# Patient Record
Sex: MALE | Race: WHITE | Employment: UNEMPLOYED | URBAN - NONMETROPOLITAN AREA
[De-identification: names, ages, dates, MRNs, and addresses within clinical notes are randomized per-mention and may not be internally consistent; named-entity substitution may affect disease eponyms.]

---

## 2019-03-05 ENCOUNTER — HOSPITAL ENCOUNTER (INPATIENT)
Age: 44
LOS: 5 days | Discharge: HOME | End: 2019-03-10
Attending: HOSPITALIST | Admitting: HOSPITALIST
Payer: MEDICARE

## 2019-03-05 DIAGNOSIS — F32.89 OTHER DEPRESSION: Primary | ICD-10-CM

## 2019-03-05 DIAGNOSIS — F10.930 ALCOHOL WITHDRAWAL SYNDROME WITHOUT COMPLICATION (HCC): ICD-10-CM

## 2019-03-05 LAB
ACETAMINOPHEN LEVEL: < 5 UG/ML (ref 0–20)
ALBUMIN SERPL-MCNC: 3.9 G/DL (ref 3.5–5.1)
ALP BLD-CCNC: 209 U/L (ref 38–126)
ALT SERPL-CCNC: 74 U/L (ref 11–66)
AMPHETAMINE+METHAMPHETAMINE URINE SCREEN: NEGATIVE
ANION GAP SERPL CALCULATED.3IONS-SCNC: 19 MEQ/L (ref 8–16)
AST SERPL-CCNC: 175 U/L (ref 5–40)
BACTERIA: ABNORMAL /HPF
BARBITURATE QUANTITATIVE URINE: NEGATIVE
BASOPHILS # BLD: 0.6 %
BASOPHILS ABSOLUTE: 0 THOU/MM3 (ref 0–0.1)
BENZODIAZEPINE QUANTITATIVE URINE: NEGATIVE
BILIRUB SERPL-MCNC: 1.3 MG/DL (ref 0.3–1.2)
BILIRUBIN DIRECT: 0.7 MG/DL (ref 0–0.3)
BILIRUBIN URINE: ABNORMAL
BLOOD, URINE: NEGATIVE
BUN BLDV-MCNC: 5 MG/DL (ref 7–22)
CALCIUM SERPL-MCNC: 9.3 MG/DL (ref 8.5–10.5)
CANNABINOID QUANTITATIVE URINE: NEGATIVE
CASTS 2: ABNORMAL /LPF
CASTS UA: ABNORMAL /LPF
CHARACTER, URINE: CLEAR
CHLORIDE BLD-SCNC: 89 MEQ/L (ref 98–111)
CO2: 25 MEQ/L (ref 23–33)
COCAINE METABOLITE QUANTITATIVE URINE: NEGATIVE
COLOR: ABNORMAL
CREAT SERPL-MCNC: 1 MG/DL (ref 0.4–1.2)
CRYSTALS, UA: ABNORMAL
EOSINOPHIL # BLD: 1 %
EOSINOPHILS ABSOLUTE: 0.1 THOU/MM3 (ref 0–0.4)
EPITHELIAL CELLS, UA: ABNORMAL /HPF
ERYTHROCYTE [DISTWIDTH] IN BLOOD BY AUTOMATED COUNT: 14.4 % (ref 11.5–14.5)
ERYTHROCYTE [DISTWIDTH] IN BLOOD BY AUTOMATED COUNT: 49.9 FL (ref 35–45)
ETHYL ALCOHOL, SERUM: < 0.01 %
GFR SERPL CREATININE-BSD FRML MDRD: 81 ML/MIN/1.73M2
GLUCOSE BLD-MCNC: 140 MG/DL (ref 70–108)
GLUCOSE URINE: NEGATIVE MG/DL
HCT VFR BLD CALC: 42.8 % (ref 42–52)
HEMOGLOBIN: 14.9 GM/DL (ref 14–18)
ICTOTEST: NEGATIVE
IMMATURE GRANS (ABS): 0.05 THOU/MM3 (ref 0–0.07)
IMMATURE GRANULOCYTES: 0.7 %
KETONES, URINE: 15
LEUKOCYTE ESTERASE, URINE: NEGATIVE
LYMPHOCYTES # BLD: 26.1 %
LYMPHOCYTES ABSOLUTE: 1.7 THOU/MM3 (ref 1–4.8)
MCH RBC QN AUTO: 33.6 PG (ref 26–33)
MCHC RBC AUTO-ENTMCNC: 34.8 GM/DL (ref 32.2–35.5)
MCV RBC AUTO: 96.4 FL (ref 80–94)
MISCELLANEOUS 2: ABNORMAL
MONOCYTES # BLD: 7.6 %
MONOCYTES ABSOLUTE: 0.5 THOU/MM3 (ref 0.4–1.3)
NITRITE, URINE: NEGATIVE
NUCLEATED RED BLOOD CELLS: 0 /100 WBC
OPIATES, URINE: NEGATIVE
OSMOLALITY CALCULATION: 265.9 MOSMOL/KG (ref 275–300)
OXYCODONE: NEGATIVE
PH UA: 6 (ref 5–9)
PHENCYCLIDINE QUANTITATIVE URINE: NEGATIVE
PLATELET # BLD: 232 THOU/MM3 (ref 130–400)
PMV BLD AUTO: 10.6 FL (ref 9.4–12.4)
POTASSIUM SERPL-SCNC: 3.8 MEQ/L (ref 3.5–5.2)
PROTEIN UA: NEGATIVE
RBC # BLD: 4.44 MILL/MM3 (ref 4.7–6.1)
RBC URINE: ABNORMAL /HPF
REASON FOR REJECTION: NORMAL
REJECTED TEST: NORMAL
RENAL EPITHELIAL, UA: ABNORMAL
SALICYLATE, SERUM: < 0.3 MG/DL (ref 2–10)
SEG NEUTROPHILS: 64 %
SEGMENTED NEUTROPHILS ABSOLUTE COUNT: 4.3 THOU/MM3 (ref 1.8–7.7)
SODIUM BLD-SCNC: 133 MEQ/L (ref 135–145)
SPECIFIC GRAVITY, URINE: 1.01 (ref 1–1.03)
TOTAL PROTEIN: 7 G/DL (ref 6.1–8)
TSH SERPL DL<=0.05 MIU/L-ACNC: 1.86 UIU/ML (ref 0.4–4.2)
UROBILINOGEN, URINE: 4 EU/DL (ref 0–1)
WBC # BLD: 6.7 THOU/MM3 (ref 4.8–10.8)
WBC UA: ABNORMAL /HPF
YEAST: ABNORMAL

## 2019-03-05 PROCEDURE — 99222 1ST HOSP IP/OBS MODERATE 55: CPT | Performed by: HOSPITALIST

## 2019-03-05 PROCEDURE — 80307 DRUG TEST PRSMV CHEM ANLYZR: CPT

## 2019-03-05 PROCEDURE — 82248 BILIRUBIN DIRECT: CPT

## 2019-03-05 PROCEDURE — 36415 COLL VENOUS BLD VENIPUNCTURE: CPT

## 2019-03-05 PROCEDURE — 1200000000 HC SEMI PRIVATE

## 2019-03-05 PROCEDURE — 80053 COMPREHEN METABOLIC PANEL: CPT

## 2019-03-05 PROCEDURE — G0480 DRUG TEST DEF 1-7 CLASSES: HCPCS

## 2019-03-05 PROCEDURE — 81001 URINALYSIS AUTO W/SCOPE: CPT

## 2019-03-05 PROCEDURE — 84443 ASSAY THYROID STIM HORMONE: CPT

## 2019-03-05 PROCEDURE — 85025 COMPLETE CBC W/AUTO DIFF WBC: CPT

## 2019-03-05 PROCEDURE — 99285 EMERGENCY DEPT VISIT HI MDM: CPT

## 2019-03-05 RX ORDER — IBUPROFEN 200 MG
CAPSULE ORAL 2 TIMES DAILY
Status: DISCONTINUED | OUTPATIENT
Start: 2019-03-05 | End: 2019-03-10 | Stop reason: HOSPADM

## 2019-03-05 RX ORDER — TRAZODONE HYDROCHLORIDE 100 MG/1
100 TABLET ORAL NIGHTLY
Status: DISCONTINUED | OUTPATIENT
Start: 2019-03-05 | End: 2019-03-10 | Stop reason: HOSPADM

## 2019-03-05 RX ORDER — LORAZEPAM 2 MG/ML
1 INJECTION INTRAMUSCULAR
Status: DISCONTINUED | OUTPATIENT
Start: 2019-03-05 | End: 2019-03-10 | Stop reason: HOSPADM

## 2019-03-05 RX ORDER — THIAMINE HYDROCHLORIDE 100 MG/ML
100 INJECTION, SOLUTION INTRAMUSCULAR; INTRAVENOUS DAILY
Status: DISCONTINUED | OUTPATIENT
Start: 2019-03-06 | End: 2019-03-10 | Stop reason: HOSPADM

## 2019-03-05 RX ORDER — LORAZEPAM 2 MG/ML
2 INJECTION INTRAMUSCULAR
Status: DISCONTINUED | OUTPATIENT
Start: 2019-03-05 | End: 2019-03-10 | Stop reason: HOSPADM

## 2019-03-05 RX ORDER — LORAZEPAM 1 MG/1
1 TABLET ORAL
Status: DISCONTINUED | OUTPATIENT
Start: 2019-03-05 | End: 2019-03-10 | Stop reason: HOSPADM

## 2019-03-05 RX ORDER — LORAZEPAM 2 MG/1
4 TABLET ORAL
Status: DISCONTINUED | OUTPATIENT
Start: 2019-03-05 | End: 2019-03-10 | Stop reason: HOSPADM

## 2019-03-05 RX ORDER — LORAZEPAM 2 MG/ML
4 INJECTION INTRAMUSCULAR
Status: DISCONTINUED | OUTPATIENT
Start: 2019-03-05 | End: 2019-03-10 | Stop reason: HOSPADM

## 2019-03-05 RX ORDER — SODIUM CHLORIDE 9 MG/ML
INJECTION, SOLUTION INTRAVENOUS CONTINUOUS
Status: DISCONTINUED | OUTPATIENT
Start: 2019-03-05 | End: 2019-03-10 | Stop reason: HOSPADM

## 2019-03-05 RX ORDER — SODIUM CHLORIDE 0.9 % (FLUSH) 0.9 %
10 SYRINGE (ML) INJECTION EVERY 12 HOURS SCHEDULED
Status: DISCONTINUED | OUTPATIENT
Start: 2019-03-05 | End: 2019-03-10 | Stop reason: HOSPADM

## 2019-03-05 RX ORDER — FOLIC ACID 1 MG/1
1 TABLET ORAL DAILY
Status: DISCONTINUED | OUTPATIENT
Start: 2019-03-06 | End: 2019-03-10 | Stop reason: HOSPADM

## 2019-03-05 RX ORDER — ESCITALOPRAM OXALATE 20 MG/1
20 TABLET ORAL DAILY
Status: DISCONTINUED | OUTPATIENT
Start: 2019-03-06 | End: 2019-03-10 | Stop reason: HOSPADM

## 2019-03-05 RX ORDER — MULTIVITAMIN WITH FOLIC ACID 400 MCG
1 TABLET ORAL DAILY
Status: DISCONTINUED | OUTPATIENT
Start: 2019-03-06 | End: 2019-03-10 | Stop reason: HOSPADM

## 2019-03-05 RX ORDER — LORAZEPAM 2 MG/1
2 TABLET ORAL
Status: DISCONTINUED | OUTPATIENT
Start: 2019-03-05 | End: 2019-03-10 | Stop reason: HOSPADM

## 2019-03-05 RX ORDER — ESCITALOPRAM OXALATE 20 MG/1
30 TABLET ORAL DAILY
Status: ON HOLD | COMMUNITY
Start: 2020-10-01 | End: 2020-12-29 | Stop reason: SDUPTHER

## 2019-03-05 RX ORDER — ATORVASTATIN CALCIUM 10 MG/1
10 TABLET, FILM COATED ORAL NIGHTLY
Status: DISCONTINUED | OUTPATIENT
Start: 2019-03-05 | End: 2019-03-10 | Stop reason: HOSPADM

## 2019-03-05 RX ORDER — CITALOPRAM 20 MG/1
20 TABLET ORAL DAILY
Status: DISCONTINUED | OUTPATIENT
Start: 2019-03-06 | End: 2019-03-05 | Stop reason: ALTCHOICE

## 2019-03-05 RX ORDER — ONDANSETRON 2 MG/ML
4 INJECTION INTRAMUSCULAR; INTRAVENOUS EVERY 6 HOURS PRN
Status: DISCONTINUED | OUTPATIENT
Start: 2019-03-05 | End: 2019-03-10 | Stop reason: HOSPADM

## 2019-03-05 RX ORDER — LORAZEPAM 1 MG/1
1 TABLET ORAL DAILY
Status: ON HOLD | COMMUNITY
End: 2019-03-10 | Stop reason: HOSPADM

## 2019-03-05 RX ORDER — LORAZEPAM 2 MG/ML
3 INJECTION INTRAMUSCULAR
Status: DISCONTINUED | OUTPATIENT
Start: 2019-03-05 | End: 2019-03-10 | Stop reason: HOSPADM

## 2019-03-05 RX ORDER — PANTOPRAZOLE SODIUM 40 MG/1
40 TABLET, DELAYED RELEASE ORAL
Status: DISCONTINUED | OUTPATIENT
Start: 2019-03-06 | End: 2019-03-10 | Stop reason: HOSPADM

## 2019-03-05 RX ORDER — LORAZEPAM 1 MG/1
1 TABLET ORAL ONCE
Status: DISCONTINUED | OUTPATIENT
Start: 2019-03-05 | End: 2019-03-05 | Stop reason: HOSPADM

## 2019-03-05 RX ORDER — SODIUM CHLORIDE 0.9 % (FLUSH) 0.9 %
10 SYRINGE (ML) INJECTION PRN
Status: DISCONTINUED | OUTPATIENT
Start: 2019-03-05 | End: 2019-03-10 | Stop reason: HOSPADM

## 2019-03-05 ASSESSMENT — SLEEP AND FATIGUE QUESTIONNAIRES
DIFFICULTY ARISING: NO
DIFFICULTY FALLING ASLEEP: YES
SLEEP PATTERN: DIFFICULTY FALLING ASLEEP;NIGHTMARES/TERRORS;DISTURBED/INTERRUPTED SLEEP;RESTLESSNESS
DIFFICULTY STAYING ASLEEP: YES
DO YOU HAVE DIFFICULTY SLEEPING: YES
RESTFUL SLEEP: NO
AVERAGE NUMBER OF SLEEP HOURS: 6
DO YOU USE A SLEEP AID: NO

## 2019-03-05 ASSESSMENT — PATIENT HEALTH QUESTIONNAIRE - PHQ9: SUM OF ALL RESPONSES TO PHQ QUESTIONS 1-9: 23

## 2019-03-05 ASSESSMENT — PAIN SCALES - GENERAL: PAINLEVEL_OUTOF10: 0

## 2019-03-05 ASSESSMENT — LIFESTYLE VARIABLES: HISTORY_ALCOHOL_USE: YES

## 2019-03-06 ENCOUNTER — APPOINTMENT (OUTPATIENT)
Dept: GENERAL RADIOLOGY | Age: 44
End: 2019-03-06
Payer: MEDICARE

## 2019-03-06 LAB
ALBUMIN SERPL-MCNC: 3.6 G/DL (ref 3.5–5.1)
ALBUMIN SERPL-MCNC: 3.6 G/DL (ref 3.5–5.1)
ALP BLD-CCNC: 184 U/L (ref 38–126)
ALP BLD-CCNC: 192 U/L (ref 38–126)
ALT SERPL-CCNC: 66 U/L (ref 11–66)
ALT SERPL-CCNC: 67 U/L (ref 11–66)
ANION GAP SERPL CALCULATED.3IONS-SCNC: 16 MEQ/L (ref 8–16)
AST SERPL-CCNC: 133 U/L (ref 5–40)
AST SERPL-CCNC: 134 U/L (ref 5–40)
BASOPHILS # BLD: 0.4 %
BASOPHILS ABSOLUTE: 0 THOU/MM3 (ref 0–0.1)
BILIRUB SERPL-MCNC: 1.1 MG/DL (ref 0.3–1.2)
BILIRUB SERPL-MCNC: 1.1 MG/DL (ref 0.3–1.2)
BILIRUBIN DIRECT: 0.5 MG/DL (ref 0–0.3)
BUN BLDV-MCNC: 5 MG/DL (ref 7–22)
CALCIUM SERPL-MCNC: 9 MG/DL (ref 8.5–10.5)
CHLORIDE BLD-SCNC: 94 MEQ/L (ref 98–111)
CO2: 25 MEQ/L (ref 23–33)
CREAT SERPL-MCNC: 0.8 MG/DL (ref 0.4–1.2)
EKG ATRIAL RATE: 78 BPM
EKG P AXIS: 31 DEGREES
EKG P-R INTERVAL: 140 MS
EKG Q-T INTERVAL: 384 MS
EKG QRS DURATION: 96 MS
EKG QTC CALCULATION (BAZETT): 437 MS
EKG R AXIS: 63 DEGREES
EKG T AXIS: 22 DEGREES
EKG VENTRICULAR RATE: 78 BPM
EOSINOPHIL # BLD: 2.5 %
EOSINOPHILS ABSOLUTE: 0.1 THOU/MM3 (ref 0–0.4)
ERYTHROCYTE [DISTWIDTH] IN BLOOD BY AUTOMATED COUNT: 14.4 % (ref 11.5–14.5)
ERYTHROCYTE [DISTWIDTH] IN BLOOD BY AUTOMATED COUNT: 50.1 FL (ref 35–45)
GFR SERPL CREATININE-BSD FRML MDRD: > 90 ML/MIN/1.73M2
GLUCOSE BLD-MCNC: 121 MG/DL (ref 70–108)
HCT VFR BLD CALC: 36.4 % (ref 42–52)
HEMOGLOBIN: 12.6 GM/DL (ref 14–18)
IMMATURE GRANS (ABS): 0.03 THOU/MM3 (ref 0–0.07)
IMMATURE GRANULOCYTES: 0.6 %
LYMPHOCYTES # BLD: 35.9 %
LYMPHOCYTES ABSOLUTE: 1.7 THOU/MM3 (ref 1–4.8)
MCH RBC QN AUTO: 33.6 PG (ref 26–33)
MCHC RBC AUTO-ENTMCNC: 34.6 GM/DL (ref 32.2–35.5)
MCV RBC AUTO: 97.1 FL (ref 80–94)
MONOCYTES # BLD: 7 %
MONOCYTES ABSOLUTE: 0.3 THOU/MM3 (ref 0.4–1.3)
NUCLEATED RED BLOOD CELLS: 0 /100 WBC
PLATELET # BLD: 169 THOU/MM3 (ref 130–400)
PMV BLD AUTO: 10.7 FL (ref 9.4–12.4)
POTASSIUM SERPL-SCNC: 3.8 MEQ/L (ref 3.5–5.2)
RBC # BLD: 3.75 MILL/MM3 (ref 4.7–6.1)
REASON FOR REJECTION: NORMAL
REJECTED TEST: NORMAL
SEG NEUTROPHILS: 53.6 %
SEGMENTED NEUTROPHILS ABSOLUTE COUNT: 2.5 THOU/MM3 (ref 1.8–7.7)
SODIUM BLD-SCNC: 135 MEQ/L (ref 135–145)
TOTAL PROTEIN: 6.3 G/DL (ref 6.1–8)
TOTAL PROTEIN: 6.6 G/DL (ref 6.1–8)
WBC # BLD: 4.7 THOU/MM3 (ref 4.8–10.8)

## 2019-03-06 PROCEDURE — 2709999900 HC NON-CHARGEABLE SUPPLY

## 2019-03-06 PROCEDURE — 71046 X-RAY EXAM CHEST 2 VIEWS: CPT

## 2019-03-06 PROCEDURE — 80076 HEPATIC FUNCTION PANEL: CPT

## 2019-03-06 PROCEDURE — 80053 COMPREHEN METABOLIC PANEL: CPT

## 2019-03-06 PROCEDURE — 99232 SBSQ HOSP IP/OBS MODERATE 35: CPT | Performed by: FAMILY MEDICINE

## 2019-03-06 PROCEDURE — 85025 COMPLETE CBC W/AUTO DIFF WBC: CPT

## 2019-03-06 PROCEDURE — 6360000002 HC RX W HCPCS: Performed by: HOSPITALIST

## 2019-03-06 PROCEDURE — 1200000000 HC SEMI PRIVATE

## 2019-03-06 PROCEDURE — 2580000003 HC RX 258: Performed by: HOSPITALIST

## 2019-03-06 PROCEDURE — 6370000000 HC RX 637 (ALT 250 FOR IP): Performed by: HOSPITALIST

## 2019-03-06 PROCEDURE — 36415 COLL VENOUS BLD VENIPUNCTURE: CPT

## 2019-03-06 PROCEDURE — 93005 ELECTROCARDIOGRAM TRACING: CPT | Performed by: HOSPITALIST

## 2019-03-06 PROCEDURE — 93010 ELECTROCARDIOGRAM REPORT: CPT | Performed by: INTERNAL MEDICINE

## 2019-03-06 PROCEDURE — 90792 PSYCH DIAG EVAL W/MED SRVCS: CPT | Performed by: PHYSICIAN ASSISTANT

## 2019-03-06 RX ADMIN — FOLIC ACID 1 MG: 1 TABLET ORAL at 08:39

## 2019-03-06 RX ADMIN — Medication 10 ML: at 10:52

## 2019-03-06 RX ADMIN — TRAZODONE HYDROCHLORIDE 100 MG: 100 TABLET ORAL at 00:17

## 2019-03-06 RX ADMIN — PANTOPRAZOLE SODIUM 40 MG: 40 TABLET, DELAYED RELEASE ORAL at 08:42

## 2019-03-06 RX ADMIN — THIAMINE HYDROCHLORIDE 100 MG: 100 INJECTION, SOLUTION INTRAMUSCULAR; INTRAVENOUS at 08:40

## 2019-03-06 RX ADMIN — LORAZEPAM 2 MG: 2 INJECTION INTRAMUSCULAR; INTRAVENOUS at 08:39

## 2019-03-06 RX ADMIN — LORAZEPAM 2 MG: 1 TABLET ORAL at 06:46

## 2019-03-06 RX ADMIN — ESCITALOPRAM OXALATE 20 MG: 20 TABLET, FILM COATED ORAL at 08:39

## 2019-03-06 RX ADMIN — METOPROLOL TARTRATE 25 MG: 25 TABLET ORAL at 08:39

## 2019-03-06 RX ADMIN — LORAZEPAM 3 MG: 1 TABLET ORAL at 20:49

## 2019-03-06 RX ADMIN — LORAZEPAM 2 MG: 1 TABLET ORAL at 00:13

## 2019-03-06 RX ADMIN — LORAZEPAM 4 MG: 2 INJECTION INTRAMUSCULAR; INTRAVENOUS at 17:06

## 2019-03-06 RX ADMIN — Medication 10 ML: at 00:14

## 2019-03-06 RX ADMIN — SODIUM CHLORIDE: 9 INJECTION, SOLUTION INTRAVENOUS at 00:14

## 2019-03-06 RX ADMIN — METOPROLOL TARTRATE 25 MG: 25 TABLET ORAL at 00:13

## 2019-03-06 RX ADMIN — THERA TABS 1 TABLET: TAB at 08:39

## 2019-03-06 RX ADMIN — SODIUM CHLORIDE: 9 INJECTION, SOLUTION INTRAVENOUS at 10:51

## 2019-03-06 RX ADMIN — ENOXAPARIN SODIUM 40 MG: 40 INJECTION SUBCUTANEOUS at 08:42

## 2019-03-07 LAB
ALBUMIN SERPL-MCNC: 3.5 G/DL (ref 3.5–5.1)
ALP BLD-CCNC: 147 U/L (ref 38–126)
ALT SERPL-CCNC: 55 U/L (ref 11–66)
ANION GAP SERPL CALCULATED.3IONS-SCNC: 14 MEQ/L (ref 8–16)
AST SERPL-CCNC: 109 U/L (ref 5–40)
BILIRUB SERPL-MCNC: 0.8 MG/DL (ref 0.3–1.2)
BUN BLDV-MCNC: 5 MG/DL (ref 7–22)
CALCIUM SERPL-MCNC: 8.2 MG/DL (ref 8.5–10.5)
CHLORIDE BLD-SCNC: 103 MEQ/L (ref 98–111)
CO2: 24 MEQ/L (ref 23–33)
CREAT SERPL-MCNC: 1 MG/DL (ref 0.4–1.2)
GFR SERPL CREATININE-BSD FRML MDRD: 81 ML/MIN/1.73M2
GLUCOSE BLD-MCNC: 125 MG/DL (ref 70–108)
POTASSIUM SERPL-SCNC: 3.8 MEQ/L (ref 3.5–5.2)
SODIUM BLD-SCNC: 141 MEQ/L (ref 135–145)
TOTAL PROTEIN: 5.9 G/DL (ref 6.1–8)

## 2019-03-07 PROCEDURE — 99232 SBSQ HOSP IP/OBS MODERATE 35: CPT | Performed by: FAMILY MEDICINE

## 2019-03-07 PROCEDURE — 2709999900 HC NON-CHARGEABLE SUPPLY

## 2019-03-07 PROCEDURE — 2580000003 HC RX 258: Performed by: HOSPITALIST

## 2019-03-07 PROCEDURE — 1200000000 HC SEMI PRIVATE

## 2019-03-07 PROCEDURE — 6370000000 HC RX 637 (ALT 250 FOR IP): Performed by: FAMILY MEDICINE

## 2019-03-07 PROCEDURE — 36415 COLL VENOUS BLD VENIPUNCTURE: CPT

## 2019-03-07 PROCEDURE — 6370000000 HC RX 637 (ALT 250 FOR IP): Performed by: PHYSICIAN ASSISTANT

## 2019-03-07 PROCEDURE — 80053 COMPREHEN METABOLIC PANEL: CPT

## 2019-03-07 PROCEDURE — 6370000000 HC RX 637 (ALT 250 FOR IP): Performed by: HOSPITALIST

## 2019-03-07 PROCEDURE — 6360000002 HC RX W HCPCS: Performed by: HOSPITALIST

## 2019-03-07 RX ORDER — ACETAMINOPHEN 325 MG/1
650 TABLET ORAL EVERY 4 HOURS PRN
Status: DISCONTINUED | OUTPATIENT
Start: 2019-03-07 | End: 2019-03-10 | Stop reason: HOSPADM

## 2019-03-07 RX ORDER — TRAMADOL HYDROCHLORIDE 50 MG/1
50 TABLET ORAL EVERY 6 HOURS PRN
Status: DISCONTINUED | OUTPATIENT
Start: 2019-03-07 | End: 2019-03-10 | Stop reason: HOSPADM

## 2019-03-07 RX ADMIN — LORAZEPAM 3 MG: 1 TABLET ORAL at 22:30

## 2019-03-07 RX ADMIN — METOPROLOL TARTRATE 25 MG: 25 TABLET ORAL at 20:12

## 2019-03-07 RX ADMIN — TRAMADOL HYDROCHLORIDE 50 MG: 50 TABLET, FILM COATED ORAL at 20:12

## 2019-03-07 RX ADMIN — FOLIC ACID 1 MG: 1 TABLET ORAL at 08:21

## 2019-03-07 RX ADMIN — ACETAMINOPHEN 650 MG: 325 TABLET ORAL at 20:38

## 2019-03-07 RX ADMIN — THIAMINE HYDROCHLORIDE 100 MG: 100 INJECTION, SOLUTION INTRAMUSCULAR; INTRAVENOUS at 08:21

## 2019-03-07 RX ADMIN — METOPROLOL TARTRATE 25 MG: 25 TABLET ORAL at 08:21

## 2019-03-07 RX ADMIN — PANTOPRAZOLE SODIUM 40 MG: 40 TABLET, DELAYED RELEASE ORAL at 08:21

## 2019-03-07 RX ADMIN — LORAZEPAM 2 MG: 2 INJECTION INTRAMUSCULAR; INTRAVENOUS at 15:54

## 2019-03-07 RX ADMIN — TRAMADOL HYDROCHLORIDE 50 MG: 50 TABLET, FILM COATED ORAL at 12:36

## 2019-03-07 RX ADMIN — Medication 10 ML: at 22:30

## 2019-03-07 RX ADMIN — ACETAMINOPHEN 650 MG: 325 TABLET ORAL at 04:09

## 2019-03-07 RX ADMIN — TRAZODONE HYDROCHLORIDE 100 MG: 100 TABLET ORAL at 20:38

## 2019-03-07 RX ADMIN — LORAZEPAM 3 MG: 1 TABLET ORAL at 20:16

## 2019-03-07 RX ADMIN — SODIUM CHLORIDE: 9 INJECTION, SOLUTION INTRAVENOUS at 08:21

## 2019-03-07 RX ADMIN — ESCITALOPRAM OXALATE 20 MG: 20 TABLET, FILM COATED ORAL at 08:21

## 2019-03-07 RX ADMIN — THERA TABS 1 TABLET: TAB at 08:21

## 2019-03-07 RX ADMIN — LORAZEPAM 3 MG: 1 TABLET ORAL at 03:12

## 2019-03-07 RX ADMIN — LORAZEPAM 2 MG: 2 INJECTION INTRAMUSCULAR; INTRAVENOUS at 08:21

## 2019-03-07 ASSESSMENT — PAIN DESCRIPTION - DESCRIPTORS
DESCRIPTORS_2: ACHING
DESCRIPTORS: ACHING
DESCRIPTORS: ACHING
DESCRIPTORS: HEADACHE

## 2019-03-07 ASSESSMENT — PAIN SCALES - GENERAL
PAINLEVEL_OUTOF10: 9
PAINLEVEL_OUTOF10: 6
PAINLEVEL_OUTOF10: 4
PAINLEVEL_OUTOF10: 6
PAINLEVEL_OUTOF10: 9
PAINLEVEL_OUTOF10: 8

## 2019-03-07 ASSESSMENT — PAIN DESCRIPTION - LOCATION
LOCATION: BACK
LOCATION: HEAD
LOCATION_2: BACK
LOCATION: BACK

## 2019-03-07 ASSESSMENT — PAIN DESCRIPTION - PROGRESSION
CLINICAL_PROGRESSION_2: NOT CHANGED
CLINICAL_PROGRESSION: NOT CHANGED
CLINICAL_PROGRESSION: GRADUALLY WORSENING
CLINICAL_PROGRESSION: NOT CHANGED
CLINICAL_PROGRESSION: GRADUALLY WORSENING

## 2019-03-07 ASSESSMENT — PAIN DESCRIPTION - DIRECTION: RADIATING_TOWARDS: LEGS

## 2019-03-07 ASSESSMENT — PAIN DESCRIPTION - ONSET
ONSET: ON-GOING
ONSET_2: ON-GOING

## 2019-03-07 ASSESSMENT — PAIN DESCRIPTION - ORIENTATION
ORIENTATION: ANTERIOR
ORIENTATION: LOWER
ORIENTATION: LOWER
ORIENTATION_2: LOWER

## 2019-03-07 ASSESSMENT — PAIN - FUNCTIONAL ASSESSMENT
PAIN_FUNCTIONAL_ASSESSMENT: ACTIVITIES ARE NOT PREVENTED

## 2019-03-07 ASSESSMENT — PAIN DESCRIPTION - PAIN TYPE
TYPE: ACUTE PAIN
TYPE: CHRONIC PAIN
TYPE_2: CHRONIC PAIN
TYPE: ACUTE PAIN

## 2019-03-07 ASSESSMENT — PAIN DESCRIPTION - FREQUENCY
FREQUENCY: CONTINUOUS

## 2019-03-07 ASSESSMENT — PAIN DESCRIPTION - DURATION: DURATION_2: CONTINUOUS

## 2019-03-07 ASSESSMENT — PAIN DESCRIPTION - INTENSITY: RATING_2: 8

## 2019-03-08 LAB
ANION GAP SERPL CALCULATED.3IONS-SCNC: 17 MEQ/L (ref 8–16)
BUN BLDV-MCNC: 8 MG/DL (ref 7–22)
CALCIUM SERPL-MCNC: 8.5 MG/DL (ref 8.5–10.5)
CHLORIDE BLD-SCNC: 101 MEQ/L (ref 98–111)
CO2: 24 MEQ/L (ref 23–33)
CREAT SERPL-MCNC: 0.9 MG/DL (ref 0.4–1.2)
GFR SERPL CREATININE-BSD FRML MDRD: > 90 ML/MIN/1.73M2
GLUCOSE BLD-MCNC: 126 MG/DL (ref 70–108)
POTASSIUM SERPL-SCNC: 3.3 MEQ/L (ref 3.5–5.2)
SODIUM BLD-SCNC: 142 MEQ/L (ref 135–145)

## 2019-03-08 PROCEDURE — 6370000000 HC RX 637 (ALT 250 FOR IP): Performed by: HOSPITALIST

## 2019-03-08 PROCEDURE — 1200000000 HC SEMI PRIVATE

## 2019-03-08 PROCEDURE — 2580000003 HC RX 258: Performed by: HOSPITALIST

## 2019-03-08 PROCEDURE — 36415 COLL VENOUS BLD VENIPUNCTURE: CPT

## 2019-03-08 PROCEDURE — 99231 SBSQ HOSP IP/OBS SF/LOW 25: CPT | Performed by: PSYCHIATRY & NEUROLOGY

## 2019-03-08 PROCEDURE — 6370000000 HC RX 637 (ALT 250 FOR IP): Performed by: FAMILY MEDICINE

## 2019-03-08 PROCEDURE — 99232 SBSQ HOSP IP/OBS MODERATE 35: CPT | Performed by: FAMILY MEDICINE

## 2019-03-08 PROCEDURE — 80048 BASIC METABOLIC PNL TOTAL CA: CPT

## 2019-03-08 RX ADMIN — PANTOPRAZOLE SODIUM 40 MG: 40 TABLET, DELAYED RELEASE ORAL at 09:15

## 2019-03-08 RX ADMIN — METOPROLOL TARTRATE 25 MG: 25 TABLET ORAL at 09:15

## 2019-03-08 RX ADMIN — LORAZEPAM 3 MG: 1 TABLET ORAL at 09:15

## 2019-03-08 RX ADMIN — Medication 10 ML: at 20:29

## 2019-03-08 RX ADMIN — TRAZODONE HYDROCHLORIDE 100 MG: 100 TABLET ORAL at 20:27

## 2019-03-08 RX ADMIN — FOLIC ACID 1 MG: 1 TABLET ORAL at 09:15

## 2019-03-08 RX ADMIN — ESCITALOPRAM OXALATE 20 MG: 20 TABLET, FILM COATED ORAL at 09:15

## 2019-03-08 RX ADMIN — Medication 10 ML: at 09:16

## 2019-03-08 RX ADMIN — METOPROLOL TARTRATE 25 MG: 25 TABLET ORAL at 20:27

## 2019-03-08 RX ADMIN — TRAMADOL HYDROCHLORIDE 50 MG: 50 TABLET, FILM COATED ORAL at 20:27

## 2019-03-08 RX ADMIN — LORAZEPAM 3 MG: 1 TABLET ORAL at 20:27

## 2019-03-08 RX ADMIN — THERA TABS 1 TABLET: TAB at 09:15

## 2019-03-08 ASSESSMENT — PAIN - FUNCTIONAL ASSESSMENT: PAIN_FUNCTIONAL_ASSESSMENT: ACTIVITIES ARE NOT PREVENTED

## 2019-03-08 ASSESSMENT — PAIN DESCRIPTION - LOCATION: LOCATION: BACK

## 2019-03-08 ASSESSMENT — PAIN DESCRIPTION - ORIENTATION: ORIENTATION: LOWER

## 2019-03-08 ASSESSMENT — PAIN SCALES - GENERAL
PAINLEVEL_OUTOF10: 5
PAINLEVEL_OUTOF10: 0

## 2019-03-08 ASSESSMENT — PAIN DESCRIPTION - PROGRESSION: CLINICAL_PROGRESSION: NOT CHANGED

## 2019-03-08 ASSESSMENT — PAIN DESCRIPTION - DESCRIPTORS: DESCRIPTORS: ACHING

## 2019-03-08 ASSESSMENT — PAIN DESCRIPTION - PAIN TYPE: TYPE: CHRONIC PAIN

## 2019-03-08 ASSESSMENT — PAIN DESCRIPTION - ONSET: ONSET: ON-GOING

## 2019-03-08 ASSESSMENT — PAIN DESCRIPTION - FREQUENCY: FREQUENCY: CONTINUOUS

## 2019-03-09 PROCEDURE — 6360000002 HC RX W HCPCS: Performed by: HOSPITALIST

## 2019-03-09 PROCEDURE — 6370000000 HC RX 637 (ALT 250 FOR IP): Performed by: PHYSICIAN ASSISTANT

## 2019-03-09 PROCEDURE — 6370000000 HC RX 637 (ALT 250 FOR IP): Performed by: HOSPITALIST

## 2019-03-09 PROCEDURE — 1200000000 HC SEMI PRIVATE

## 2019-03-09 PROCEDURE — 2580000003 HC RX 258: Performed by: HOSPITALIST

## 2019-03-09 PROCEDURE — 99232 SBSQ HOSP IP/OBS MODERATE 35: CPT | Performed by: FAMILY MEDICINE

## 2019-03-09 RX ADMIN — FOLIC ACID 1 MG: 1 TABLET ORAL at 09:39

## 2019-03-09 RX ADMIN — Medication 10 ML: at 09:38

## 2019-03-09 RX ADMIN — METOPROLOL TARTRATE 25 MG: 25 TABLET ORAL at 09:39

## 2019-03-09 RX ADMIN — ACETAMINOPHEN 650 MG: 325 TABLET ORAL at 09:38

## 2019-03-09 RX ADMIN — ENOXAPARIN SODIUM 40 MG: 40 INJECTION SUBCUTANEOUS at 09:39

## 2019-03-09 RX ADMIN — PANTOPRAZOLE SODIUM 40 MG: 40 TABLET, DELAYED RELEASE ORAL at 05:33

## 2019-03-09 RX ADMIN — LORAZEPAM 2 MG: 1 TABLET ORAL at 04:15

## 2019-03-09 RX ADMIN — TRAZODONE HYDROCHLORIDE 100 MG: 100 TABLET ORAL at 22:20

## 2019-03-09 RX ADMIN — LORAZEPAM 2 MG: 1 TABLET ORAL at 22:20

## 2019-03-09 RX ADMIN — ESCITALOPRAM OXALATE 20 MG: 20 TABLET, FILM COATED ORAL at 09:39

## 2019-03-09 RX ADMIN — THIAMINE HYDROCHLORIDE 100 MG: 100 INJECTION, SOLUTION INTRAMUSCULAR; INTRAVENOUS at 09:39

## 2019-03-09 RX ADMIN — LORAZEPAM 1 MG: 1 TABLET ORAL at 09:39

## 2019-03-09 RX ADMIN — Medication 10 ML: at 22:20

## 2019-03-09 RX ADMIN — THERA TABS 1 TABLET: TAB at 09:39

## 2019-03-09 RX ADMIN — LORAZEPAM 2 MG: 1 TABLET ORAL at 12:40

## 2019-03-09 RX ADMIN — LORAZEPAM 2 MG: 1 TABLET ORAL at 18:39

## 2019-03-09 RX ADMIN — METOPROLOL TARTRATE 25 MG: 25 TABLET ORAL at 22:16

## 2019-03-09 ASSESSMENT — PAIN DESCRIPTION - INTENSITY
RATING_2: 0
RATING_2: 0

## 2019-03-09 ASSESSMENT — PAIN SCALES - GENERAL
PAINLEVEL_OUTOF10: 4
PAINLEVEL_OUTOF10: 5
PAINLEVEL_OUTOF10: 6

## 2019-03-09 ASSESSMENT — PAIN DESCRIPTION - LOCATION
LOCATION: BACK
LOCATION: BACK

## 2019-03-09 ASSESSMENT — PAIN DESCRIPTION - DESCRIPTORS
DESCRIPTORS: ACHING
DESCRIPTORS: ACHING

## 2019-03-09 ASSESSMENT — PAIN DESCRIPTION - ONSET
ONSET: ON-GOING
ONSET: ON-GOING

## 2019-03-09 ASSESSMENT — PAIN DESCRIPTION - PAIN TYPE
TYPE: CHRONIC PAIN
TYPE: CHRONIC PAIN

## 2019-03-09 ASSESSMENT — PAIN DESCRIPTION - ORIENTATION
ORIENTATION: LOWER
ORIENTATION: LOWER

## 2019-03-09 ASSESSMENT — PAIN - FUNCTIONAL ASSESSMENT
PAIN_FUNCTIONAL_ASSESSMENT: ACTIVITIES ARE NOT PREVENTED
PAIN_FUNCTIONAL_ASSESSMENT: ACTIVITIES ARE NOT PREVENTED

## 2019-03-09 ASSESSMENT — PAIN DESCRIPTION - FREQUENCY
FREQUENCY: INTERMITTENT
FREQUENCY: INTERMITTENT

## 2019-03-09 ASSESSMENT — PAIN DESCRIPTION - PROGRESSION
CLINICAL_PROGRESSION: NOT CHANGED
CLINICAL_PROGRESSION: NOT CHANGED

## 2019-03-10 VITALS
TEMPERATURE: 98.2 F | HEIGHT: 70 IN | RESPIRATION RATE: 15 BRPM | WEIGHT: 222.2 LBS | BODY MASS INDEX: 31.81 KG/M2 | DIASTOLIC BLOOD PRESSURE: 78 MMHG | SYSTOLIC BLOOD PRESSURE: 129 MMHG | OXYGEN SATURATION: 94 % | HEART RATE: 94 BPM

## 2019-03-10 PROCEDURE — 99239 HOSP IP/OBS DSCHRG MGMT >30: CPT | Performed by: FAMILY MEDICINE

## 2019-03-10 PROCEDURE — 6370000000 HC RX 637 (ALT 250 FOR IP): Performed by: HOSPITALIST

## 2019-03-10 PROCEDURE — 2580000003 HC RX 258: Performed by: HOSPITALIST

## 2019-03-10 PROCEDURE — 99231 SBSQ HOSP IP/OBS SF/LOW 25: CPT | Performed by: PSYCHIATRY & NEUROLOGY

## 2019-03-10 PROCEDURE — 6360000002 HC RX W HCPCS: Performed by: HOSPITALIST

## 2019-03-10 RX ADMIN — LORAZEPAM 2 MG: 1 TABLET ORAL at 03:27

## 2019-03-10 RX ADMIN — THERA TABS 1 TABLET: TAB at 10:05

## 2019-03-10 RX ADMIN — THIAMINE HYDROCHLORIDE 100 MG: 100 INJECTION, SOLUTION INTRAMUSCULAR; INTRAVENOUS at 10:06

## 2019-03-10 RX ADMIN — ENOXAPARIN SODIUM 40 MG: 40 INJECTION SUBCUTANEOUS at 10:05

## 2019-03-10 RX ADMIN — FOLIC ACID 1 MG: 1 TABLET ORAL at 10:05

## 2019-03-10 RX ADMIN — Medication 10 ML: at 10:07

## 2019-03-10 RX ADMIN — PANTOPRAZOLE SODIUM 40 MG: 40 TABLET, DELAYED RELEASE ORAL at 06:23

## 2019-03-10 RX ADMIN — METOPROLOL TARTRATE 25 MG: 25 TABLET ORAL at 10:05

## 2019-03-10 RX ADMIN — ESCITALOPRAM OXALATE 20 MG: 20 TABLET, FILM COATED ORAL at 10:05

## 2019-03-10 ASSESSMENT — PAIN SCALES - GENERAL: PAINLEVEL_OUTOF10: 0

## 2020-12-27 ENCOUNTER — HOSPITAL ENCOUNTER (INPATIENT)
Age: 45
LOS: 2 days | Discharge: HOME | End: 2020-12-29
Attending: INTERNAL MEDICINE | Admitting: INTERNAL MEDICINE
Payer: MEDICAID

## 2020-12-27 PROBLEM — F10.930 ALCOHOL WITHDRAWAL, UNCOMPLICATED (HCC): Status: ACTIVE | Noted: 2020-12-27

## 2020-12-27 LAB
ALBUMIN SERPL-MCNC: 4.4 G/DL (ref 3.5–5.1)
ALP BLD-CCNC: 85 U/L (ref 38–126)
ALT SERPL-CCNC: 45 U/L (ref 11–66)
ANION GAP SERPL CALCULATED.3IONS-SCNC: 17 MEQ/L (ref 8–16)
AST SERPL-CCNC: 110 U/L (ref 5–40)
BASOPHILS # BLD: 0.4 %
BASOPHILS ABSOLUTE: 0 THOU/MM3 (ref 0–0.1)
BILIRUB SERPL-MCNC: 0.7 MG/DL (ref 0.3–1.2)
BUN BLDV-MCNC: 10 MG/DL (ref 7–22)
CALCIUM SERPL-MCNC: 9.3 MG/DL (ref 8.5–10.5)
CHLORIDE BLD-SCNC: 96 MEQ/L (ref 98–111)
CO2: 27 MEQ/L (ref 23–33)
CREAT SERPL-MCNC: 0.8 MG/DL (ref 0.4–1.2)
EKG ATRIAL RATE: 109 BPM
EKG P AXIS: 29 DEGREES
EKG P-R INTERVAL: 128 MS
EKG Q-T INTERVAL: 360 MS
EKG QRS DURATION: 80 MS
EKG QTC CALCULATION (BAZETT): 484 MS
EKG R AXIS: 58 DEGREES
EKG T AXIS: 26 DEGREES
EKG VENTRICULAR RATE: 109 BPM
EOSINOPHIL # BLD: 2.7 %
EOSINOPHILS ABSOLUTE: 0.1 THOU/MM3 (ref 0–0.4)
ERYTHROCYTE [DISTWIDTH] IN BLOOD BY AUTOMATED COUNT: 13 % (ref 11.5–14.5)
ERYTHROCYTE [DISTWIDTH] IN BLOOD BY AUTOMATED COUNT: 47.2 FL (ref 35–45)
ETHYL ALCOHOL, SERUM: 0.03 %
FOLATE: 3.9 NG/ML (ref 4.8–24.2)
GFR SERPL CREATININE-BSD FRML MDRD: > 90 ML/MIN/1.73M2
GLUCOSE BLD-MCNC: 152 MG/DL (ref 70–108)
HCT VFR BLD CALC: 42.2 % (ref 42–52)
HEMOGLOBIN: 14.8 GM/DL (ref 14–18)
IMMATURE GRANS (ABS): 0.03 THOU/MM3 (ref 0–0.07)
IMMATURE GRANULOCYTES: 0.7 %
LIPASE: 90.1 U/L (ref 5.6–51.3)
LYMPHOCYTES # BLD: 35.1 %
LYMPHOCYTES ABSOLUTE: 1.6 THOU/MM3 (ref 1–4.8)
MCH RBC QN AUTO: 35 PG (ref 26–33)
MCHC RBC AUTO-ENTMCNC: 35.1 GM/DL (ref 32.2–35.5)
MCV RBC AUTO: 99.8 FL (ref 80–94)
MONOCYTES # BLD: 10.8 %
MONOCYTES ABSOLUTE: 0.5 THOU/MM3 (ref 0.4–1.3)
MRSA SCREEN RT-PCR: NEGATIVE
NUCLEATED RED BLOOD CELLS: 0 /100 WBC
OSMOLALITY CALCULATION: 281.4 MOSMOL/KG (ref 275–300)
PLATELET # BLD: 253 THOU/MM3 (ref 130–400)
PMV BLD AUTO: 10.1 FL (ref 9.4–12.4)
POTASSIUM REFLEX MAGNESIUM: 3.8 MEQ/L (ref 3.5–5.2)
RBC # BLD: 4.23 MILL/MM3 (ref 4.7–6.1)
SARS-COV-2, NAAT: NOT DETECTED
SEG NEUTROPHILS: 50.3 %
SEGMENTED NEUTROPHILS ABSOLUTE COUNT: 2.3 THOU/MM3 (ref 1.8–7.7)
SODIUM BLD-SCNC: 140 MEQ/L (ref 135–145)
TOTAL PROTEIN: 7.4 G/DL (ref 6.1–8)
VANCOMYCIN RESISTANT ENTEROCOCCUS: NEGATIVE
VITAMIN B-12: 472 PG/ML (ref 211–911)
WBC # BLD: 4.5 THOU/MM3 (ref 4.8–10.8)

## 2020-12-27 PROCEDURE — 87641 MR-STAPH DNA AMP PROBE: CPT

## 2020-12-27 PROCEDURE — 83690 ASSAY OF LIPASE: CPT

## 2020-12-27 PROCEDURE — 96376 TX/PRO/DX INJ SAME DRUG ADON: CPT

## 2020-12-27 PROCEDURE — 6370000000 HC RX 637 (ALT 250 FOR IP): Performed by: INTERNAL MEDICINE

## 2020-12-27 PROCEDURE — 93005 ELECTROCARDIOGRAM TRACING: CPT | Performed by: PHYSICIAN ASSISTANT

## 2020-12-27 PROCEDURE — 82746 ASSAY OF FOLIC ACID SERUM: CPT

## 2020-12-27 PROCEDURE — 87500 VANOMYCIN DNA AMP PROBE: CPT

## 2020-12-27 PROCEDURE — 85025 COMPLETE CBC W/AUTO DIFF WBC: CPT

## 2020-12-27 PROCEDURE — U0002 COVID-19 LAB TEST NON-CDC: HCPCS

## 2020-12-27 PROCEDURE — 99284 EMERGENCY DEPT VISIT MOD MDM: CPT

## 2020-12-27 PROCEDURE — 6360000002 HC RX W HCPCS: Performed by: PHYSICIAN ASSISTANT

## 2020-12-27 PROCEDURE — 96374 THER/PROPH/DIAG INJ IV PUSH: CPT

## 2020-12-27 PROCEDURE — 96375 TX/PRO/DX INJ NEW DRUG ADDON: CPT

## 2020-12-27 PROCEDURE — 82607 VITAMIN B-12: CPT

## 2020-12-27 PROCEDURE — 2580000003 HC RX 258: Performed by: INTERNAL MEDICINE

## 2020-12-27 PROCEDURE — 80053 COMPREHEN METABOLIC PANEL: CPT

## 2020-12-27 PROCEDURE — 2500000003 HC RX 250 WO HCPCS: Performed by: PHYSICIAN ASSISTANT

## 2020-12-27 PROCEDURE — 6360000002 HC RX W HCPCS: Performed by: INTERNAL MEDICINE

## 2020-12-27 PROCEDURE — 2580000003 HC RX 258: Performed by: PHYSICIAN ASSISTANT

## 2020-12-27 PROCEDURE — G0480 DRUG TEST DEF 1-7 CLASSES: HCPCS

## 2020-12-27 PROCEDURE — 87081 CULTURE SCREEN ONLY: CPT

## 2020-12-27 PROCEDURE — 36415 COLL VENOUS BLD VENIPUNCTURE: CPT

## 2020-12-27 PROCEDURE — 2060000000 HC ICU INTERMEDIATE R&B

## 2020-12-27 RX ORDER — POTASSIUM CHLORIDE 7.45 MG/ML
10 INJECTION INTRAVENOUS PRN
Status: DISCONTINUED | OUTPATIENT
Start: 2020-12-27 | End: 2020-12-29 | Stop reason: HOSPADM

## 2020-12-27 RX ORDER — LORAZEPAM 2 MG/ML
1 INJECTION INTRAMUSCULAR
Status: DISCONTINUED | OUTPATIENT
Start: 2020-12-27 | End: 2020-12-29 | Stop reason: HOSPADM

## 2020-12-27 RX ORDER — POTASSIUM CHLORIDE 20 MEQ/1
40 TABLET, EXTENDED RELEASE ORAL PRN
Status: DISCONTINUED | OUTPATIENT
Start: 2020-12-27 | End: 2020-12-29 | Stop reason: HOSPADM

## 2020-12-27 RX ORDER — LISINOPRIL 10 MG/1
10 TABLET ORAL DAILY
Status: ON HOLD | COMMUNITY
Start: 2020-06-01 | End: 2020-12-29 | Stop reason: SDUPTHER

## 2020-12-27 RX ORDER — PANTOPRAZOLE SODIUM 40 MG/1
40 TABLET, DELAYED RELEASE ORAL
Status: DISCONTINUED | OUTPATIENT
Start: 2020-12-28 | End: 2020-12-27

## 2020-12-27 RX ORDER — SODIUM CHLORIDE 9 MG/ML
INJECTION, SOLUTION INTRAVENOUS CONTINUOUS
Status: DISCONTINUED | OUTPATIENT
Start: 2020-12-27 | End: 2020-12-29 | Stop reason: HOSPADM

## 2020-12-27 RX ORDER — PROMETHAZINE HYDROCHLORIDE 25 MG/1
12.5 TABLET ORAL EVERY 6 HOURS PRN
Status: DISCONTINUED | OUTPATIENT
Start: 2020-12-27 | End: 2020-12-29 | Stop reason: HOSPADM

## 2020-12-27 RX ORDER — SODIUM CHLORIDE 0.9 % (FLUSH) 0.9 %
10 SYRINGE (ML) INJECTION EVERY 12 HOURS SCHEDULED
Status: DISCONTINUED | OUTPATIENT
Start: 2020-12-27 | End: 2020-12-29 | Stop reason: HOSPADM

## 2020-12-27 RX ORDER — ONDANSETRON 2 MG/ML
4 INJECTION INTRAMUSCULAR; INTRAVENOUS EVERY 6 HOURS PRN
Status: DISCONTINUED | OUTPATIENT
Start: 2020-12-27 | End: 2020-12-29 | Stop reason: HOSPADM

## 2020-12-27 RX ORDER — MAGNESIUM SULFATE IN WATER 40 MG/ML
2 INJECTION, SOLUTION INTRAVENOUS PRN
Status: DISCONTINUED | OUTPATIENT
Start: 2020-12-27 | End: 2020-12-29 | Stop reason: HOSPADM

## 2020-12-27 RX ORDER — LORAZEPAM 2 MG/ML
4 INJECTION INTRAMUSCULAR
Status: DISCONTINUED | OUTPATIENT
Start: 2020-12-27 | End: 2020-12-29 | Stop reason: HOSPADM

## 2020-12-27 RX ORDER — LORAZEPAM 2 MG/ML
1 INJECTION INTRAMUSCULAR ONCE
Status: COMPLETED | OUTPATIENT
Start: 2020-12-27 | End: 2020-12-27

## 2020-12-27 RX ORDER — ONDANSETRON 2 MG/ML
4 INJECTION INTRAMUSCULAR; INTRAVENOUS ONCE
Status: COMPLETED | OUTPATIENT
Start: 2020-12-27 | End: 2020-12-27

## 2020-12-27 RX ORDER — LORAZEPAM 2 MG/ML
3 INJECTION INTRAMUSCULAR
Status: DISCONTINUED | OUTPATIENT
Start: 2020-12-27 | End: 2020-12-29 | Stop reason: HOSPADM

## 2020-12-27 RX ORDER — MULTIVITAMIN WITH IRON
1 TABLET ORAL DAILY
Status: DISCONTINUED | OUTPATIENT
Start: 2020-12-28 | End: 2020-12-29 | Stop reason: HOSPADM

## 2020-12-27 RX ORDER — DISULFIRAM 250 MG/1
250 TABLET ORAL DAILY
Status: DISCONTINUED | OUTPATIENT
Start: 2020-12-27 | End: 2020-12-29 | Stop reason: HOSPADM

## 2020-12-27 RX ORDER — FOLIC ACID 1 MG/1
1 TABLET ORAL DAILY
Status: DISCONTINUED | OUTPATIENT
Start: 2020-12-27 | End: 2020-12-29 | Stop reason: HOSPADM

## 2020-12-27 RX ORDER — LORAZEPAM 2 MG/1
2 TABLET ORAL
Status: DISCONTINUED | OUTPATIENT
Start: 2020-12-27 | End: 2020-12-29 | Stop reason: HOSPADM

## 2020-12-27 RX ORDER — LORAZEPAM 2 MG/ML
2 INJECTION INTRAMUSCULAR
Status: DISCONTINUED | OUTPATIENT
Start: 2020-12-27 | End: 2020-12-29 | Stop reason: HOSPADM

## 2020-12-27 RX ORDER — ACETAMINOPHEN 325 MG/1
650 TABLET ORAL EVERY 4 HOURS PRN
Status: DISCONTINUED | OUTPATIENT
Start: 2020-12-27 | End: 2020-12-29 | Stop reason: HOSPADM

## 2020-12-27 RX ORDER — SODIUM CHLORIDE 0.9 % (FLUSH) 0.9 %
10 SYRINGE (ML) INJECTION PRN
Status: DISCONTINUED | OUTPATIENT
Start: 2020-12-27 | End: 2020-12-29 | Stop reason: HOSPADM

## 2020-12-27 RX ORDER — ESCITALOPRAM OXALATE 20 MG/1
20 TABLET ORAL DAILY
Status: DISCONTINUED | OUTPATIENT
Start: 2020-12-27 | End: 2020-12-29 | Stop reason: HOSPADM

## 2020-12-27 RX ORDER — LISINOPRIL 10 MG/1
10 TABLET ORAL DAILY
Status: DISCONTINUED | OUTPATIENT
Start: 2020-12-27 | End: 2020-12-29 | Stop reason: HOSPADM

## 2020-12-27 RX ORDER — LORAZEPAM 1 MG/1
1 TABLET ORAL
Status: DISCONTINUED | OUTPATIENT
Start: 2020-12-27 | End: 2020-12-29 | Stop reason: HOSPADM

## 2020-12-27 RX ORDER — POLYETHYLENE GLYCOL 3350 17 G/17G
17 POWDER, FOR SOLUTION ORAL DAILY PRN
Status: DISCONTINUED | OUTPATIENT
Start: 2020-12-27 | End: 2020-12-29 | Stop reason: HOSPADM

## 2020-12-27 RX ORDER — LANOLIN ALCOHOL/MO/W.PET/CERES
100 CREAM (GRAM) TOPICAL DAILY
Status: DISCONTINUED | OUTPATIENT
Start: 2020-12-31 | End: 2020-12-29 | Stop reason: HOSPADM

## 2020-12-27 RX ORDER — PANTOPRAZOLE SODIUM 40 MG/1
40 TABLET, DELAYED RELEASE ORAL
Status: DISCONTINUED | OUTPATIENT
Start: 2020-12-27 | End: 2020-12-29 | Stop reason: HOSPADM

## 2020-12-27 RX ORDER — LORAZEPAM 2 MG/1
4 TABLET ORAL
Status: DISCONTINUED | OUTPATIENT
Start: 2020-12-27 | End: 2020-12-29 | Stop reason: HOSPADM

## 2020-12-27 RX ADMIN — FOLIC ACID 1 MG: 1 TABLET ORAL at 16:22

## 2020-12-27 RX ADMIN — ONDANSETRON 4 MG: 2 INJECTION INTRAMUSCULAR; INTRAVENOUS at 11:40

## 2020-12-27 RX ADMIN — ESCITALOPRAM 20 MG: 20 TABLET, FILM COATED ORAL at 15:14

## 2020-12-27 RX ADMIN — METOPROLOL TARTRATE 25 MG: 25 TABLET ORAL at 15:13

## 2020-12-27 RX ADMIN — FOLIC ACID: 5 INJECTION, SOLUTION INTRAMUSCULAR; INTRAVENOUS; SUBCUTANEOUS at 12:30

## 2020-12-27 RX ADMIN — LORAZEPAM 1 MG: 2 INJECTION INTRAMUSCULAR; INTRAVENOUS at 11:40

## 2020-12-27 RX ADMIN — LISINOPRIL 10 MG: 10 TABLET ORAL at 16:22

## 2020-12-27 RX ADMIN — DISULFIRAM 250 MG: 250 TABLET ORAL at 18:02

## 2020-12-27 RX ADMIN — ONDANSETRON 4 MG: 2 INJECTION INTRAMUSCULAR; INTRAVENOUS at 20:18

## 2020-12-27 RX ADMIN — PANTOPRAZOLE SODIUM 40 MG: 40 TABLET, DELAYED RELEASE ORAL at 16:24

## 2020-12-27 RX ADMIN — LORAZEPAM 1 MG: 2 INJECTION INTRAMUSCULAR; INTRAVENOUS at 12:55

## 2020-12-27 RX ADMIN — Medication 10 ML: at 17:33

## 2020-12-27 RX ADMIN — LORAZEPAM 2 MG: 2 INJECTION INTRAMUSCULAR; INTRAVENOUS at 20:18

## 2020-12-27 RX ADMIN — LORAZEPAM 1 MG: 2 INJECTION INTRAMUSCULAR; INTRAVENOUS at 16:05

## 2020-12-27 RX ADMIN — LORAZEPAM 2 MG: 2 INJECTION INTRAMUSCULAR; INTRAVENOUS at 17:32

## 2020-12-27 RX ADMIN — Medication 10 ML: at 16:05

## 2020-12-27 RX ADMIN — Medication 10 ML: at 14:45

## 2020-12-27 RX ADMIN — LORAZEPAM 2 MG: 2 INJECTION INTRAMUSCULAR; INTRAVENOUS at 22:01

## 2020-12-27 RX ADMIN — LORAZEPAM 2 MG: 2 INJECTION INTRAMUSCULAR; INTRAVENOUS at 14:41

## 2020-12-27 ASSESSMENT — ENCOUNTER SYMPTOMS
SORE THROAT: 0
SHORTNESS OF BREATH: 0
COUGH: 0
VOMITING: 0
ABDOMINAL PAIN: 0
COLOR CHANGE: 0
NAUSEA: 1

## 2020-12-27 ASSESSMENT — PAIN SCALES - GENERAL
PAINLEVEL_OUTOF10: 0
PAINLEVEL_OUTOF10: 0

## 2020-12-27 NOTE — ED NOTES
Patient is resting in bed with easy and unlabored respirations. Patient states tremors are improving. Call light in reach. Side rails up x2. Patient denies further complaints or concerns. Will monitor.         Rob Sanchez RN  12/27/20 9737

## 2020-12-27 NOTE — ED PROVIDER NOTES
[facility]  eMERGENCY dEPARTMENT eNCOUnter          CHIEF COMPLAINT       Chief Complaint   Patient presents with    Alcohol Problem     Requests Detox    Nausea    Shaking       Nurses Notes reviewed and I agree except as noted inthe HPI. HISTORY OF PRESENT ILLNESS    Matilde Farooq is a 39 y.o. male who presents to the Emergency Department for the evaluation of alcohol detox. Patient reports that he has been drinking daily for years. His last extended episode of sobriety was a few years ago when he was sober for 4 months. Otherwise, he was admitted at [facility] for approximately 36 hours a few months ago and was sober for less than 2 or 3 days, reporting he began drinking again once the withdrawal symptoms returned. He believes he drinks secondary to his depression for which she has been prescribed antidepressants but stopped taking these 3 weeks ago and states he does not know why he stopped. He has not been taking any of his medications for the past 3 weeks. Reports drinking 1.5 L of vodka daily and this volume has increased recently. Last intake was around 1030 or 11 PM yesterday evening. Denies any associated drug use. Denies any history of withdrawal seizures but does have history of DTs. No suicidal or homicidal ideation. No abdominal pain, diarrhea, chest pain, shortness of breath, headaches or vomiting. He does report nausea, anxiety, restlessness, shakiness and sensation of heart racing. The HPI was provided by the patient. REVIEW OF SYSTEMS     Review of Systems   Constitutional: Negative for fever. HENT: Negative for sore throat. Respiratory: Negative for cough and shortness of breath. Cardiovascular: Positive for palpitations. Negative for chest pain. Gastrointestinal: Positive for nausea. Negative for abdominal pain and vomiting. Genitourinary: Negative for dysuria. Musculoskeletal: Negative for myalgias.    Skin: Negative for color change. Neurological: Positive for tremors. Negative for syncope and headaches. Psychiatric/Behavioral: The patient is nervous/anxious. PAST MEDICAL HISTORY    has a past medical history of Alcohol dependence ([address] Arthritis, Hyperlipidemia, Hypertension, Liver disease, Panic attacks, Pneumonia, Psychiatric problem, and Scoliosis. SURGICAL HISTORY      has a past surgical history that includes back surgery; EKG 12 Lead (4/14/2015); and Cardiac surgery. CURRENT MEDICATIONS       Current Discharge Medication List      CONTINUE these medications which have NOT CHANGED    Details   lisinopril (PRINIVIL;ZESTRIL) 10 MG tablet Take 10 mg by mouth daily      escitalopram (LEXAPRO) 20 MG tablet Take 30 mg by mouth daily       acetaminophen 650 MG TABS Take 650 mg by mouth every 4 hours as needed. Qty: 120 tablet, Refills: 3      pantoprazole (PROTONIX) 40 MG tablet Take 1 tablet by mouth every morning (before breakfast). Qty: 30 tablet, Refills: 3      vitamin B-1 100 MG tablet Take 1 tablet by mouth daily. Qty: 30 tablet, Refills: 0      folic acid (FOLVITE) 1 MG tablet Take 1 tablet by mouth daily. Qty: 30 tablet, Refills: 0      metoprolol (LOPRESSOR) 25 MG tablet Take 1 tablet by mouth 2 times daily. Qty: 60 tablet, Refills: 0             ALLERGIES     is allergic to fentanyl. FAMILY HISTORY     He indicated that his mother is alive. He indicated that his father is alive. He indicated that his sister is alive. He indicated that his brother is alive. family history includes Arthritis in his father; Depression in his brother, father, and sister; Diabetes in his brother and father; Heart Disease in his father; High Blood Pressure in his father; High Cholesterol in his father; Kidney Disease in his mother; Substance Abuse in his father. SOCIAL HISTORY      reports that he has quit smoking. He has a 4.25 pack-year smoking history.  He has never used smokeless tobacco. He reports current alcohol use. He reports that he does not use drugs. PHYSICAL EXAM     INITIAL VITALS:  height is 5' 10\" (1.778 m) and weight is 212 lb 6.4 oz (96.3 kg). His oral temperature is 98.2 °F (36.8 °C). His blood pressure is 121/81 and his pulse is 110. His respiration is 16 and oxygen saturation is 94%. Physical Exam  Vitals signs and nursing note reviewed. HENT:      Head: Normocephalic and atraumatic. Eyes:      Conjunctiva/sclera: Conjunctivae normal.   Neck:      Musculoskeletal: Normal range of motion. Cardiovascular:      Rate and Rhythm: Regular rhythm. Tachycardia present. Heart sounds: Normal heart sounds. No murmur. Pulmonary:      Effort: Pulmonary effort is normal. No respiratory distress. Breath sounds: Normal breath sounds. No wheezing. Abdominal:      Palpations: Abdomen is soft. Tenderness: There is no abdominal tenderness. There is no guarding. Skin:     General: Skin is warm and dry. Neurological:      General: No focal deficit present. Mental Status: He is alert. Sensory: Sensation is intact. Motor: Tremor present. Psychiatric:         Mood and Affect: Mood is anxious. Affect is tearful. Thought Content: Thought content does not include homicidal or suicidal ideation. DIFFERENTIAL DIAGNOSIS:   Differential diagnoses are discussed    DIAGNOSTIC RESULTS     EKG: All EKG's are interpreted by the Emergency Department Physician who either signs or Co-signsthis chart in the absence of a cardiologist.    Minoo Puga. Rate: 109 bpm  PRinterval: 128 ms  QRS duration: 80 ms  QTc: 484 ms  P-R-T axes: 29, 58, 26  Sinus tachycardia. No STEMI.   Compared to old EKG on 3-6-19      RADIOLOGY: non-plain film images(s) such as CT, Ultrasound and MRI are read by the radiologist.    No orders to display       LABS:      Labs Reviewed   CBC WITH AUTO DIFFERENTIAL - Abnormal; Notable for the following components:       Result Value    WBC 4.5 (*)     RBC 4.23 (*)     MCV 99.8 (*)     MCH 35.0 (*)     RDW-SD 47.2 (*)     All other components within normal limits   COMPREHENSIVE METABOLIC PANEL W/ REFLEX TO MG FOR LOW K - Abnormal; Notable for the following components:    Glucose 152 (*)     Chloride 96 (*)      (*)     All other components within normal limits   VITAMIN B12 & FOLATE - Abnormal; Notable for the following components:    Folate 3.9 (*)     All other components within normal limits   LIPASE - Abnormal; Notable for the following components:    Lipase 90.1 (*)     All other components within normal limits   ANION GAP - Abnormal; Notable for the following components:    Anion Gap 17.0 (*)     All other components within normal limits   VRE SCREEN BY PCR   CULTURE, MRSA, SCREENING   ETHANOL   COVID-19   GLOMERULAR FILTRATION RATE, ESTIMATED   OSMOLALITY   MRSA BY PCR   URINE DRUG SCREEN   PHOSPHORUS   CALCIUM, IONIZED   LIPASE   VITAMIN B12   TSH WITHOUT REFLEX   HEMOGLOBIN A1C   CBC   BASIC METABOLIC PANEL W/ REFLEX TO MG FOR LOW K   MAGNESIUM       EMERGENCY DEPARTMENT COURSE:   Vitals:    Vitals:    12/27/20 1600 12/27/20 1729 12/27/20 1858 12/27/20 2010   BP: (!) 135/91 (!) 139/98 115/81 121/81   Pulse: 115 112 108 110   Resp:    16   Temp:    98.2 °F (36.8 °C)   TempSrc:    Oral   SpO2:    94%   Weight:       Height:          11:35 AM EST: The patient was seen and evaluated. Patient presents for complaints of alcohol withdrawal.  He presents with CIWA 10 and PAWS score 6. He is tachycardic upon arrival with mild hypertension. Laboratory results revealed mildly elevated AST at 110, lipase 90 and folate 3.9. White blood cell count mildly decreased at 4.5, Covid screening negative. He was treated with IV fluids/banana bag as well as Zofran, Ativan with improvement in his symptoms. Discussed admission with internal medicine who was agreeable and patient was agreeable with this plan as well.       CRITICAL CARE:   None    CONSULTS:  Dr. Manuela Sotelo, internal medicine    PROCEDURES:  None    FINAL IMPRESSION      1. Alcohol withdrawal syndrome without complication (HCC)          DISPOSITION/PLAN   Admit    PATIENT REFERRED TO:  No follow-up provider specified.     DISCHARGEMEDICATIONS:  Current Discharge Medication List          (Please note that portions of this note were completedwith a voice recognition program.  Efforts were made to edit the dictations but occasionally words are mis-transcribed.)        Prosper Dsouza PA-C  12/27/20 9137

## 2020-12-27 NOTE — H&P
Internal Medicine  History and Physical    Patient:  Eleno Lauren  MRN: [id number]      History Obtained From:  patient  PCP: Myles Jensen MD    CHIEF COMPLAINT:  Nausea, shakes    HISTORY OF PRESENT ILLNESS:   The patient is a 39 y.o. male known alcoholic who presents with nausea and shakes. He requests help with detoxification. He admitted to drinkng 1-1.5 L of vodka daily. Last drink was last night. He admitted to upper abd pain, no vomiting, no seizure, no HA. He failed previous alcohol rehabs. Past Medical History:        Diagnosis Date    Alcohol dependence ([address]     Arthritis     Hyperlipidemia     Hypertension     Liver disease     Panic attacks     Pneumonia     Psychiatric problem     major depressive disorder    Scoliosis        Past Surgical History:        Procedure Laterality Date    BACK SURGERY      x2    CARDIAC SURGERY      EKG 12-LEAD  4/14/2015            Medications Prior to Admission:    Prior to Admission medications    Medication Sig Start Date End Date Taking? Authorizing Provider   lisinopril (PRINIVIL;ZESTRIL) 10 MG tablet Take 10 mg by mouth daily 6/1/20  Yes Historical Provider, MD   escitalopram (LEXAPRO) 20 MG tablet Take 30 mg by mouth daily  10/1/20  Yes Historical Provider, MD   acetaminophen 650 MG TABS Take 650 mg by mouth every 4 hours as needed. 4/15/15  Yes Canelo Rosales MD   pantoprazole (PROTONIX) 40 MG tablet Take 1 tablet by mouth every morning (before breakfast). 4/15/15  Yes Canelo Rosales MD   vitamin B-1 100 MG tablet Take 1 tablet by mouth daily. 4/15/15  Yes Canelo Rosales MD   folic acid (FOLVITE) 1 MG tablet Take 1 tablet by mouth daily. 4/15/15  Yes Canelo Rosales MD   metoprolol (LOPRESSOR) 25 MG tablet Take 1 tablet by mouth 2 times daily.   Patient taking differently: Take 50 mg by mouth daily  4/15/15   Canelo Rosales MD       Allergies:  Fentanyl    Social History:   TOBACCO:   reports that he has quit smoking. He has a 4.25 pack-year smoking history. He has never used smokeless tobacco.  ETOH:   reports current alcohol use.       Family History:       Problem Relation Age of Onset    Kidney Disease Mother     Arthritis Father     Depression Father     Diabetes Father     Heart Disease Father     High Blood Pressure Father     High Cholesterol Father     Substance Abuse Father         alcohol/ pain medications    Depression Sister     Depression Brother     Diabetes Brother        REVIEW OF SYSTEMS:  CONSTITUTIONAL:  positive for  fatigue and malaise  negative for  fevers and chills  EYES:  negative for  visual disturbance, irritation, redness and icterus  HEENT:  negative for  earaches, nasal congestion, sore mouth and sore throat  RESPIRATORY:  negative for  dry cough, dyspnea, wheezing and hemoptysis  CARDIOVASCULAR:  negative for  chest pain, dyspnea, palpitations, orthopnea, PND  GASTROINTESTINAL:  positive for nausea and abdominal pain  negative for change in bowel habits, abdominal mass and abdominal distention  GENITOURINARY:  negative for frequency, dysuria and hematuria  HEMATOLOGIC/LYMPHATIC:  negative for easy bruising, bleeding and lymphadenopathy  ENDOCRINE:  negative for heat intolerance and cold intolerance  MUSCULOSKELETAL:  positive for  pain  negative for  myalgias, decreased range of motion and muscle weakness  NEUROLOGICAL:  negative for headaches, dizziness, seizures, memory problems, speech problems and visual disturbance  BEHAVIOR/PSYCH:  positive for decreased energy level, depressed mood and anxiety and negative for increased agitation    Physical Exam:    Vitals: /80   Pulse 113   Temp 98.3 °F (36.8 °C) (Oral)   Resp 20   Ht 5' 10\" (1.778 m)   Wt 212 lb 6.4 oz (96.3 kg)   SpO2 98%   BMI 30.48 kg/m²   CONSTITUTIONAL:  awake, alert, cooperative, no apparent distress, and appears stated age  EYES:  extra-ocular muscles intact  ENT:  normocepalic, without obvious abrasion S00.81XA    Status post fall Z91.81    Alcohol intoxication ([facility] [address] [address] F10.929    Alcohol withdrawal, uncomplicated (HCC) B22.915       Christian Ferrer MD  Admitting Internist

## 2020-12-28 LAB
ANION GAP SERPL CALCULATED.3IONS-SCNC: 9 MEQ/L (ref 8–16)
AVERAGE GLUCOSE: 126 MG/DL (ref 70–126)
BUN BLDV-MCNC: 10 MG/DL (ref 7–22)
CALCIUM IONIZED: 1.01 MMOL/L (ref 1.12–1.32)
CALCIUM SERPL-MCNC: 8.3 MG/DL (ref 8.5–10.5)
CHLORIDE BLD-SCNC: 98 MEQ/L (ref 98–111)
CO2: 31 MEQ/L (ref 23–33)
CREAT SERPL-MCNC: 1 MG/DL (ref 0.4–1.2)
ERYTHROCYTE [DISTWIDTH] IN BLOOD BY AUTOMATED COUNT: 13 % (ref 11.5–14.5)
ERYTHROCYTE [DISTWIDTH] IN BLOOD BY AUTOMATED COUNT: 48.4 FL (ref 35–45)
GFR SERPL CREATININE-BSD FRML MDRD: 81 ML/MIN/1.73M2
GLUCOSE BLD-MCNC: 137 MG/DL (ref 70–108)
HBA1C MFR BLD: 6.2 % (ref 4.4–6.4)
HCT VFR BLD CALC: 38.3 % (ref 42–52)
HEMOGLOBIN: 13.1 GM/DL (ref 14–18)
LIPASE: 85.2 U/L (ref 5.6–51.3)
MAGNESIUM: 1.4 MG/DL (ref 1.6–2.4)
MCH RBC QN AUTO: 35.3 PG (ref 26–33)
MCHC RBC AUTO-ENTMCNC: 34.2 GM/DL (ref 32.2–35.5)
MCV RBC AUTO: 103.2 FL (ref 80–94)
PHOSPHORUS: 3.1 MG/DL (ref 2.4–4.7)
PLATELET # BLD: 211 THOU/MM3 (ref 130–400)
PMV BLD AUTO: 10.5 FL (ref 9.4–12.4)
POTASSIUM REFLEX MAGNESIUM: 4 MEQ/L (ref 3.5–5.2)
RBC # BLD: 3.71 MILL/MM3 (ref 4.7–6.1)
SODIUM BLD-SCNC: 138 MEQ/L (ref 135–145)
TSH SERPL DL<=0.05 MIU/L-ACNC: 1.25 UIU/ML (ref 0.4–4.2)
VITAMIN B-12: 499 PG/ML (ref 211–911)
WBC # BLD: 4.7 THOU/MM3 (ref 4.8–10.8)

## 2020-12-28 PROCEDURE — 85027 COMPLETE CBC AUTOMATED: CPT

## 2020-12-28 PROCEDURE — 2500000003 HC RX 250 WO HCPCS: Performed by: INTERNAL MEDICINE

## 2020-12-28 PROCEDURE — 90792 PSYCH DIAG EVAL W/MED SRVCS: CPT | Performed by: PSYCHIATRY & NEUROLOGY

## 2020-12-28 PROCEDURE — 84100 ASSAY OF PHOSPHORUS: CPT

## 2020-12-28 PROCEDURE — 80048 BASIC METABOLIC PNL TOTAL CA: CPT

## 2020-12-28 PROCEDURE — 83690 ASSAY OF LIPASE: CPT

## 2020-12-28 PROCEDURE — 82330 ASSAY OF CALCIUM: CPT

## 2020-12-28 PROCEDURE — 6370000000 HC RX 637 (ALT 250 FOR IP): Performed by: INTERNAL MEDICINE

## 2020-12-28 PROCEDURE — 83036 HEMOGLOBIN GLYCOSYLATED A1C: CPT

## 2020-12-28 PROCEDURE — 93010 ELECTROCARDIOGRAM REPORT: CPT | Performed by: NUCLEAR MEDICINE

## 2020-12-28 PROCEDURE — 83735 ASSAY OF MAGNESIUM: CPT

## 2020-12-28 PROCEDURE — 2580000003 HC RX 258: Performed by: INTERNAL MEDICINE

## 2020-12-28 PROCEDURE — 36415 COLL VENOUS BLD VENIPUNCTURE: CPT

## 2020-12-28 PROCEDURE — 6360000002 HC RX W HCPCS: Performed by: INTERNAL MEDICINE

## 2020-12-28 PROCEDURE — 2060000000 HC ICU INTERMEDIATE R&B

## 2020-12-28 PROCEDURE — 82607 VITAMIN B-12: CPT

## 2020-12-28 PROCEDURE — 51798 US URINE CAPACITY MEASURE: CPT

## 2020-12-28 PROCEDURE — 84443 ASSAY THYROID STIM HORMONE: CPT

## 2020-12-28 RX ADMIN — FOLIC ACID: 5 INJECTION, SOLUTION INTRAMUSCULAR; INTRAVENOUS; SUBCUTANEOUS at 09:30

## 2020-12-28 RX ADMIN — LORAZEPAM 2 MG: 2 TABLET ORAL at 09:31

## 2020-12-28 RX ADMIN — SODIUM CHLORIDE: 9 INJECTION, SOLUTION INTRAVENOUS at 00:54

## 2020-12-28 RX ADMIN — LORAZEPAM 2 MG: 2 INJECTION INTRAMUSCULAR; INTRAVENOUS at 05:38

## 2020-12-28 RX ADMIN — LORAZEPAM 2 MG: 2 INJECTION INTRAMUSCULAR; INTRAVENOUS at 00:51

## 2020-12-28 RX ADMIN — LORAZEPAM 2 MG: 2 TABLET ORAL at 21:49

## 2020-12-28 RX ADMIN — PANTOPRAZOLE SODIUM 40 MG: 40 TABLET, DELAYED RELEASE ORAL at 05:38

## 2020-12-28 RX ADMIN — LORAZEPAM 2 MG: 2 TABLET ORAL at 12:46

## 2020-12-28 RX ADMIN — ESCITALOPRAM 20 MG: 20 TABLET, FILM COATED ORAL at 09:31

## 2020-12-28 RX ADMIN — METOPROLOL TARTRATE 25 MG: 25 TABLET ORAL at 21:49

## 2020-12-28 RX ADMIN — LORAZEPAM 3 MG: 2 INJECTION INTRAMUSCULAR; INTRAVENOUS at 02:06

## 2020-12-28 RX ADMIN — LORAZEPAM 4 MG: 2 TABLET ORAL at 17:27

## 2020-12-28 RX ADMIN — LISINOPRIL 10 MG: 10 TABLET ORAL at 09:31

## 2020-12-28 RX ADMIN — FOLIC ACID 1 MG: 1 TABLET ORAL at 09:31

## 2020-12-28 RX ADMIN — DISULFIRAM 250 MG: 250 TABLET ORAL at 09:31

## 2020-12-28 RX ADMIN — PROMETHAZINE HYDROCHLORIDE 12.5 MG: 25 TABLET ORAL at 21:49

## 2020-12-28 RX ADMIN — Medication 10 ML: at 09:31

## 2020-12-28 RX ADMIN — SODIUM CHLORIDE: 9 INJECTION, SOLUTION INTRAVENOUS at 21:50

## 2020-12-28 RX ADMIN — Medication 10 ML: at 21:50

## 2020-12-28 RX ADMIN — ENOXAPARIN SODIUM 40 MG: 40 INJECTION SUBCUTANEOUS at 15:18

## 2020-12-28 RX ADMIN — MAGNESIUM SULFATE HEPTAHYDRATE 2 G: 40 INJECTION, SOLUTION INTRAVENOUS at 05:54

## 2020-12-28 RX ADMIN — METOPROLOL TARTRATE 25 MG: 25 TABLET ORAL at 09:31

## 2020-12-28 RX ADMIN — Medication 1 TABLET: at 09:31

## 2020-12-28 RX ADMIN — ONDANSETRON 4 MG: 2 INJECTION INTRAMUSCULAR; INTRAVENOUS at 02:06

## 2020-12-28 RX ADMIN — LORAZEPAM 2 MG: 2 TABLET ORAL at 15:15

## 2020-12-28 ASSESSMENT — PATIENT HEALTH QUESTIONNAIRE - PHQ9: SUM OF ALL RESPONSES TO PHQ QUESTIONS 1-9: 24

## 2020-12-28 NOTE — CONSULTS
Brief Intervention and Referral to Treatment Summary    Patient was provided PHQ-9, AUDIT and DAST Screening:      PHQ-9 Score: 24  AUDIT Score: 36   DAST Score:  n/a    Patients substance use is considered     Dependent    Patients depression is considered:     Severe    Brief Education Was Provided    Patient was receptive    Brief Intervention Is Provided (Only for AUDIT or DAST)     Patient reports readiness to decrease and/or stop use and a plan was discussed     Recommendations/Referrals for Brief and/or Specialized Treatment Provided to Patient     Completed consult. Pt reports drinking alcohol daily. Reports history and current feelings of depression. Denied SI. Psych to be consulted per report. Pt does not have a current follow-up plan but reports intent to follow-up. resources provided to pt I.e. violet, nava, lighthouse etc. Pt receptive.

## 2020-12-28 NOTE — CARE COORDINATION
DISASTER CHARTING    12/28/20, 7:37 AM EST    DISCHARGE ONGOING EVALUATION:     [address] day: 1  Location: [facility]12/012-A Reason for admit: Alcohol withdrawal, uncomplicated ([facility] [W17.882]   Barriers to Discharge: here for detox, nausea and shakiness, addiction services consulted, psychiatry consulted, on Antabuse, Ativan protocol, alcohol and durg W/D scale  PCP: Marcio Grigsby MD  Patient Goals/Plan/Treatment Preferences: SW consulted to see for needs.

## 2020-12-28 NOTE — CONSULTS
Department of Psychiatry  Consult Service   Psychiatric Assessment      Thank you very much for allowing us to participate in the care of this patient. Reason for Consult:  Alcohol withdrawal, depression    HISTORY OF PRESENT ILLNESS:          The patient is a 39 y.o. male with significant history of alcohol abuse who is admitted medically for alcohol withdrawal management. He admitted to drinkng 1-1.5 L of vodka daily. Last drink was 2 nights ago. He is currently on the [facility] protocol with Ativan. PHQ-9 was 24. Psychiatry was consulted for evaluation of depression    Sammy Wang reports he has been feeling depressed most of his life. He states his depression started when he was 12years old. He got help from his family doctor when he was 21years old but is not sure if he was started on medications at that time. He reports his depression has been building up and getting worse the past few years. When asked about recent stressors, he reports a lot of stuff including his alcohol abuse. He endorses feeling down and sad for more days than not. He reports he sleeps poor at home. He will sleep for a few hours, drink, then sleep for a few hours, then drink again. He reports he has trouble staying asleep. States he feels tired in the morning most days when he wakes up. He reports he usually does not have any energy. Motivation has been none. He states he eats when he has to. He can go 3 to 4 days without eating at a time. He endorses trouble with attention and concentration. Reports anhedonia for \"just about everything. \"  Has been feeling worthless, hopeless, and helpless. He has been experiencing passive suicidal ideation. Denies any suicidal ideation without plan and intent. He endorses a history of depression. He has been seeing his primary care provider for psychotropic medication management. He is currently on Lexapro 30 mg daily but has been off of it for about 3 weeks because \"I gave up. \"  He felt the Lexapro was helping a little bit. He denies any past suicide attempts. He reports he was admitted to [facility] 4 to 5 years ago for suicidal ideation. He reports he started drinking heavily 20 years ago. His longest period of sobriety since then was 4 months. Within the last 8 months, he has been drinking a liter and a half of vodka daily. He feels he has been self-medicating his depression with alcohol. He reports he gets the shakes when he stops drinking. He has experienced withdrawals in the past.  He denies any history of seizures but does endorse auditory hallucinations. He reports he has been to rehab at [facility] [facility]. He felt that [facility] was really helpful for him and he is hoping to go back there following discharge from [facility]. He denies any illicit drug use. Patient has been taking larger amount of alcohol. He has desire to cut down drinking. Patient has feelings of guilt, related to drinking alcohol. Patient has been spending time in getting alcohol and using alcohol. Patient has cravings for alcohol. Recurrent use of alcohol has affected patient social life and relationships. Patient also has developed tolerance to alcohol. Janet Laughter appears down and flat on examination. He is tearful at times throughout the assessment. He reports his depression is not very good today. He endorses passive suicidal ideation without plan or intent. Continues to feel hopeless and helpless today. He states his withdrawals are coming back. He appears to have some mild tremoring in his hands. He is currently hearing voices that are muffled. He is unable to tell if they are male or female. He reports he only hears voices when he is withdrawing from alcohol. He denies any other hallucinations today. Denies any thoughts of harm to others. No evidence of delusions or overt psychosis on examination.     PSYCHIATRIC HISTORY: · Outpatient psychiatric provider: Primary care provider  · Suicide attempts: Denies  · Inpatient psychiatric admissions: Reports he was admitted to [facility] 4 to 5 years ago for suicidal ideation    Past psychiatric medications includes:   Lexapro  Prozac-did not work  Zoloft-headaches  Wellbutrin-did not work  Cymbalta-made me feel like a zombie          Lifetime Psychiatric Review of Systems      ·    Obsessions and Compulsions: Denies    ·    [place] or Hypomania: Denies  ·    Hallucinations: Denies  ·    Panic Attacks:  Denies  ·    Delusions:  Denies  ·    Phobias:  Denies  ·    Trauma: Denies    Prior to Admission medications    Medication Sig Start Date End Date Taking? Authorizing Provider   lisinopril (PRINIVIL;ZESTRIL) 10 MG tablet Take 10 mg by mouth daily 6/1/20  Yes Historical Provider, MD   escitalopram (LEXAPRO) 20 MG tablet Take 30 mg by mouth daily  10/1/20  Yes Historical Provider, MD   acetaminophen 650 MG TABS Take 650 mg by mouth every 4 hours as needed. 4/15/15  Yes Ja Ervin MD   pantoprazole (PROTONIX) 40 MG tablet Take 1 tablet by mouth every morning (before breakfast). 4/15/15  Yes Ja Ervin MD   vitamin B-1 100 MG tablet Take 1 tablet by mouth daily. 4/15/15  Yes Ja Ervin MD   folic acid (FOLVITE) 1 MG tablet Take 1 tablet by mouth daily. 4/15/15  Yes Ja Ervin MD   metoprolol (LOPRESSOR) 25 MG tablet Take 1 tablet by mouth 2 times daily.   Patient taking differently: Take 50 mg by mouth daily  4/15/15   Ja Ervin MD        Medications:    Current Facility-Administered Medications: escitalopram (LEXAPRO) tablet 20 mg, 20 mg, Oral, Daily  acetaminophen (TYLENOL) tablet 650 mg, 650 mg, Oral, Q4H PRN  metoprolol tartrate (LOPRESSOR) tablet 25 mg, 25 mg, Oral, BID  [START ON 12/31/2020] thiamine tablet 100 mg, 100 mg, Oral, Daily  sodium chloride flush 0.9 % injection 10 mL, 10 mL, Intravenous, 2 times per day  sodium chloride flush 0.9 % injection 10 mL, 10 mL, Intravenous, PRN  enoxaparin (LOVENOX) injection 40 mg, 40 mg, Subcutaneous, Q24H  promethazine (PHENERGAN) tablet 12.5 mg, 12.5 mg, Oral, Q6H PRN **OR** ondansetron (ZOFRAN) injection 4 mg, 4 mg, Intravenous, Q6H PRN  polyethylene glycol (GLYCOLAX) packet 17 g, 17 g, Oral, Daily PRN  0.9 % sodium chloride infusion, , Intravenous, Continuous  multivitamin 1 tablet, 1 tablet, Oral, Daily  potassium chloride (KLOR-CON M) extended release tablet 40 mEq, 40 mEq, Oral, PRN **OR** potassium bicarb-citric acid (EFFER-K) effervescent tablet 40 mEq, 40 mEq, Oral, PRN **OR** potassium chloride 10 mEq/100 mL IVPB (Peripheral Line), 10 mEq, Intravenous, PRN  magnesium sulfate 2 g in 50 mL IVPB premix, 2 g, Intravenous, PRN  LORazepam (ATIVAN) tablet 1 mg, 1 mg, Oral, Q1H PRN **OR** LORazepam (ATIVAN) injection 1 mg, 1 mg, Intravenous, Q1H PRN **OR** LORazepam (ATIVAN) tablet 2 mg, 2 mg, Oral, Q1H PRN **OR** LORazepam (ATIVAN) injection 2 mg, 2 mg, Intravenous, Q1H PRN **OR** LORazepam (ATIVAN) tablet 3 mg, 3 mg, Oral, Q1H PRN **OR** LORazepam (ATIVAN) injection 3 mg, 3 mg, Intravenous, Q1H PRN **OR** LORazepam (ATIVAN) tablet 4 mg, 4 mg, Oral, Q1H PRN **OR** LORazepam (ATIVAN) injection 4 mg, 4 mg, Intravenous, Q1H PRN  sodium chloride 0.9 % 0,687 mL with folic acid 1 mg, thiamine 100 mg, , Intravenous, Daily  folic acid (FOLVITE) tablet 1 mg, 1 mg, Oral, Daily  lisinopril (PRINIVIL;ZESTRIL) tablet 10 mg, 10 mg, Oral, Daily  disulfiram (ANTABUSE) tablet 250 mg, 250 mg, Oral, Daily  pantoprazole (PROTONIX) tablet 40 mg, 40 mg, Oral, QAM AC     Past Medical History:        Diagnosis Date    [facility] dependence ([facility] [address]     Arthritis     Hyperlipidemia     Hypertension     Liver disease     Panic attacks     Pneumonia     Psychiatric problem     major depressive disorder    Scoliosis        Past Surgical History:        Procedure Laterality Date    BACK SURGERY      x2 goal directed, coherent  Thought content: Passive suicidal ideations. 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  Acute alcohol withdrawals  Alcohol use disorder, severe with dependence    General Medical Condition  Patient Active Problem List   Diagnosis Code    Alcohol withdrawal ([facility] [address] F10.239    Dysthymia F34.1    Palpitation R00.2    Chest pain R07.9    Alcohol withdrawal syndrome ([facility] [address] F10.239    Suicide ideation R45.851    Depression F32.9    Anxiety F41.9    HTN (hypertension) I10    HLD (hyperlipidemia) E78.5    Obesity E66.9    Alcohol dependence ([facility] [address] F10.20    Sinus tachycardia R00.0    Forehead abrasion S00.81XA    Status post fall Z91.81    Alcohol intoxication ([facility] [address] F10.929    Alcohol withdrawal, uncomplicated ([facility] [address] X99.139       Stressors     Severity of stressors is moderate  Source of stressors include: Other psychosocial and environmental stressors    RECOMMENDATIONS:    Provide patient with resources for outpatient psychiatric services  I discussed intensive outpatient programming at [facility] with Ziyad Bowman. He seems somewhat receptive but states he is planning on going to inpatient rehabilitation following discharge. Please provide patient with information on IOP  Patient can be discharged home once he is medically clear as he does not meet criteria for inpatient psychiatric admission at this time. Psychiatry will sign off. Thank you very much for allowing us to participate in the care of this patient. Time spent 45 min.         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  A caregiver was present in the room along with the patient. Pursuant to the emergency declaration under the [facility], [address] [place] [address] authority and the [employer] Act, this Virtual Visit was conducted with patient's (and/or legal guardian's) consent, to reduce the patient's risk of exposure to COVID-19 and provide necessary medical care. Services were provided through a video synchronous discussion virtually to substitute for in-person visit by provider. Patient is present at [address] [place] [address], [facility] and I am physically present at [place], [place]    --Claudia Card MD on 12/28/2020 at [address] was used to authenticate this note. **This report has been created using voice recognition software. It may contain minor errors which are inherent in voice recognition technology. **

## 2020-12-29 VITALS
RESPIRATION RATE: 16 BRPM | DIASTOLIC BLOOD PRESSURE: 85 MMHG | BODY MASS INDEX: 32.07 KG/M2 | OXYGEN SATURATION: 92 % | SYSTOLIC BLOOD PRESSURE: 132 MMHG | TEMPERATURE: 97.7 F | HEIGHT: 70 IN | WEIGHT: 224 LBS | HEART RATE: 100 BPM

## 2020-12-29 LAB
MAGNESIUM: 1.6 MG/DL (ref 1.6–2.4)
MRSA SCREEN: NORMAL

## 2020-12-29 PROCEDURE — 83735 ASSAY OF MAGNESIUM: CPT

## 2020-12-29 PROCEDURE — 94760 N-INVAS EAR/PLS OXIMETRY 1: CPT

## 2020-12-29 PROCEDURE — 2580000003 HC RX 258: Performed by: INTERNAL MEDICINE

## 2020-12-29 PROCEDURE — 6370000000 HC RX 637 (ALT 250 FOR IP): Performed by: INTERNAL MEDICINE

## 2020-12-29 PROCEDURE — 36415 COLL VENOUS BLD VENIPUNCTURE: CPT

## 2020-12-29 PROCEDURE — 2500000003 HC RX 250 WO HCPCS: Performed by: INTERNAL MEDICINE

## 2020-12-29 PROCEDURE — 6360000002 HC RX W HCPCS: Performed by: INTERNAL MEDICINE

## 2020-12-29 RX ORDER — MULTIVITAMIN WITH IRON
1 TABLET ORAL DAILY
Qty: 90 TABLET | Refills: 3 | Status: ON HOLD | OUTPATIENT
Start: 2020-12-30 | End: 2022-03-08

## 2020-12-29 RX ORDER — FOLIC ACID 1 MG/1
1 TABLET ORAL DAILY
Qty: 30 TABLET | Refills: 5 | Status: ON HOLD | OUTPATIENT
Start: 2020-12-29 | End: 2022-02-09

## 2020-12-29 RX ORDER — PANTOPRAZOLE SODIUM 40 MG/1
40 TABLET, DELAYED RELEASE ORAL
Qty: 30 TABLET | Refills: 3 | Status: ON HOLD | OUTPATIENT
Start: 2020-12-29 | End: 2022-02-09

## 2020-12-29 RX ORDER — LISINOPRIL 10 MG/1
10 TABLET ORAL DAILY
Qty: 30 TABLET | Refills: 3 | Status: SHIPPED | OUTPATIENT
Start: 2020-12-29

## 2020-12-29 RX ORDER — LANOLIN ALCOHOL/MO/W.PET/CERES
100 CREAM (GRAM) TOPICAL DAILY
Qty: 30 TABLET | Refills: 5 | Status: ON HOLD | OUTPATIENT
Start: 2020-12-29 | End: 2022-02-09

## 2020-12-29 RX ORDER — DISULFIRAM 250 MG/1
250 TABLET ORAL DAILY
Qty: 30 TABLET | Refills: 2 | Status: ON HOLD | OUTPATIENT
Start: 2020-12-30 | End: 2022-02-09

## 2020-12-29 RX ORDER — ESCITALOPRAM OXALATE 20 MG/1
30 TABLET ORAL DAILY
Qty: 45 TABLET | Refills: 3 | Status: SHIPPED | OUTPATIENT
Start: 2020-12-29

## 2020-12-29 RX ADMIN — LORAZEPAM 2 MG: 2 TABLET ORAL at 01:01

## 2020-12-29 RX ADMIN — ACETAMINOPHEN 650 MG: 325 TABLET ORAL at 13:12

## 2020-12-29 RX ADMIN — DISULFIRAM 250 MG: 250 TABLET ORAL at 08:42

## 2020-12-29 RX ADMIN — METOPROLOL TARTRATE 25 MG: 25 TABLET ORAL at 08:42

## 2020-12-29 RX ADMIN — Medication 1 TABLET: at 08:42

## 2020-12-29 RX ADMIN — FOLIC ACID: 5 INJECTION, SOLUTION INTRAMUSCULAR; INTRAVENOUS; SUBCUTANEOUS at 11:39

## 2020-12-29 RX ADMIN — LORAZEPAM 2 MG: 2 TABLET ORAL at 13:12

## 2020-12-29 RX ADMIN — LORAZEPAM 2 MG: 2 TABLET ORAL at 08:42

## 2020-12-29 RX ADMIN — ESCITALOPRAM 20 MG: 20 TABLET, FILM COATED ORAL at 08:42

## 2020-12-29 RX ADMIN — PANTOPRAZOLE SODIUM 40 MG: 40 TABLET, DELAYED RELEASE ORAL at 05:58

## 2020-12-29 RX ADMIN — Medication 10 ML: at 08:42

## 2020-12-29 RX ADMIN — SODIUM CHLORIDE: 9 INJECTION, SOLUTION INTRAVENOUS at 06:27

## 2020-12-29 RX ADMIN — LORAZEPAM 2 MG: 2 TABLET ORAL at 02:32

## 2020-12-29 RX ADMIN — MAGNESIUM SULFATE HEPTAHYDRATE 2 G: 40 INJECTION, SOLUTION INTRAVENOUS at 08:45

## 2020-12-29 RX ADMIN — PROMETHAZINE HYDROCHLORIDE 12.5 MG: 25 TABLET ORAL at 08:42

## 2020-12-29 RX ADMIN — LISINOPRIL 10 MG: 10 TABLET ORAL at 08:42

## 2020-12-29 RX ADMIN — FOLIC ACID 1 MG: 1 TABLET ORAL at 08:42

## 2020-12-29 ASSESSMENT — PAIN DESCRIPTION - LOCATION: LOCATION: HEAD

## 2020-12-29 ASSESSMENT — PAIN DESCRIPTION - PAIN TYPE: TYPE: ACUTE PAIN

## 2020-12-29 ASSESSMENT — PAIN SCALES - GENERAL
PAINLEVEL_OUTOF10: 7
PAINLEVEL_OUTOF10: 3

## 2020-12-29 NOTE — DISCHARGE SUMMARY
Discharge Summary    Domenic Farooq  :  1975  MRN:  [id number]    Admit date:  2020  Discharge date:      Admitting Physician:  Dion Bailey MD    Discharge Diagnoses:    1. Alcohol abuse with withdrawal, stableclinically  2. depression  3. HTN  4. Folate def  5. Elevated transaminase, related to alcohol abuse    Admission Condition:  serious  Discharged Condition:  good    Hospital Course:   Patient was hydrated with IVF, kept on Thiamine, folic acid and ativan per [facility] protocol. He remained stable. Will seek help as OP into an alcohol rehab program.    Discharge Medications:       [place], [address] [place] [address] Medication Instructions ARS:[id number]    Printed on:20 3230   Medication Information                      acetaminophen 650 MG TABS  Take 650 mg by mouth every 4 hours as needed. disulfiram (ANTABUSE) 250 MG tablet  Take 1 tablet by mouth daily             escitalopram (LEXAPRO) 20 MG tablet  Take 1.5 tablets by mouth daily             folic acid (FOLVITE) 1 MG tablet  Take 1 tablet by mouth daily             lisinopril (PRINIVIL;ZESTRIL) 10 MG tablet  Take 1 tablet by mouth daily             metoprolol tartrate (LOPRESSOR) 25 MG tablet  Take 1 tablet by mouth 2 times daily             Multiple Vitamin (MULTIVITAMIN) TABS tablet  Take 1 tablet by mouth daily             pantoprazole (PROTONIX) 40 MG tablet  Take 1 tablet by mouth every morning (before breakfast)             thiamine 100 MG tablet  Take 1 tablet by mouth daily                 Consults:  psychiatry and Addiction services    DSM-5 DIAGNOSIS:       Severe episode of recurrent major depressive disorder without psychotic features  Acute alcohol withdrawals  Alcohol use disorder, severe with dependence      Stressors     Severity of stressors is moderate  Source of stressors include:   Other psychosocial and environmental stressors     RECOMMENDATIONS:    Provide patient with resources for outpatient psychiatric

## 2020-12-29 NOTE — CARE COORDINATION
12/29/20, 2:41 PM EST  Plans home with parents; Addiction Services has met with client re: OP information; collaborated with Tiffanie Sanchez  Patient goals/plan/ treatment preferences discussed by  and . Patient goals/plan/ treatment preferences reviewed with patient/ family. Patient/ family verbalize understanding of discharge plan and are in agreement with goal/plan/treatment preferences. Understanding was demonstrated using the teach back method. AVS provided by RN at time of discharge, which includes all necessary medical information pertaining to the patients current course of illness, treatment, post-discharge goals of care, and treatment preferences.

## 2020-12-29 NOTE — CARE COORDINATION
Admitting diagnosis: Alcohol   Currently in treatment/where: NA  Currently taking subutex/soboxone/vivitrol: NA  If yes, who prescribes:   Housing at discharge: Return to living with parents  Given Housing List: [address] [place] Y[place] List: KASSANDRA provided list  Agreeable to treatment, if yes where:   Insurance: Payor: Ileana Amor / [facility]  / Product Type: *No Product type* /    Medication Assistance: Denied need  Transportation Assistance:  Denied need    Spoke with patient and he plans to return home to parents home. He stated that he has been to treatment before and plans to set up his own treatment after the first of the year. He stated that he has all needed numbers and denied needing assistance.

## 2021-01-13 NOTE — PROGRESS NOTES
Bladder scan performed due to patient not having voided yet this shift. Bladder scan volume was 378 ml.  This RN asked patient if he wanted to try to void at this time, patient refused, stating he will try \"in a little bit\"
CLINICAL PHARMACY NOTE: MEDS TO [address] Select Patient?: No  Total # of Prescriptions Filled: 7   The following medications were delivered to the patient:  Pantoprazole 40mg  Escitalopram 20mg  MVI  Metoprolol Tartrate 25mg  Thiamine 595SV  Folic Acid 1mg  Lisinopril 10mg  Total # of Interventions Completed: 2  Time Spent (min): 30    Additional Documentation:
Discharge teaching and instructions for diagnosis/procedure of alcohol withdrawal completed with patient using teachback method. AVS reviewed. Printed prescriptions given to patient. Patient voiced understanding regarding prescriptions, follow up appointments, and care of self at home. Discharged in a wheelchair to  home with support per family.
INTERNAL MEDICINE Progress Note  12/29/2020 2:09 PM  Subjective:   Admit Date: 12/27/2020  PCP: Shayy Lopez MD  Interval History:   No shakes, non sweaty    Objective:   Vitals: /85   Pulse 100   Temp 97.7 °F (36.5 °C) (Oral)   Resp 16   Ht 5' 10\" (1.778 m)   Wt 224 lb (101.6 kg)   SpO2 92%   BMI 32.14 kg/m²   General appearance: alert and cooperative with exam  HEENT:  atraumatic  Neck:  no JVD  Lungs: clear to auscultation bilaterally  Heart: S1, S2 normal  Abdomen: soft, non-tender; bowel sounds normal; no masses,  no organomegaly  Extremities: no edema,   Neurologic: Alert, oriented, thought content appropriate, mild tremors hands      Medications:   Scheduled Meds:   escitalopram  20 mg Oral Daily    metoprolol tartrate  25 mg Oral BID    [START ON 12/31/2020] thiamine  100 mg Oral Daily    sodium chloride flush  10 mL Intravenous 2 times per day    enoxaparin  40 mg Subcutaneous Q24H    multivitamin  1 tablet Oral Daily    folic acid, thiamine, multi-vitamin with vitamin K infusion   Intravenous Daily    folic acid  1 mg Oral Daily    lisinopril  10 mg Oral Daily    disulfiram  250 mg Oral Daily    pantoprazole  40 mg Oral QAM AC     Continuous Infusions:   sodium chloride 125 mL/hr at 12/29/20 0627       Lab Results:   CBC:   Recent Labs     12/27/20  1115 12/28/20  0450   WBC 4.5* 4.7*   HGB 14.8 13.1*    211     BMP:    Recent Labs     12/27/20  1115 12/28/20  0450    138   K 3.8 4.0   CL 96* 98   CO2 27 31   BUN 10 10   CREATININE 0.8 1.0   GLUCOSE 152* 137*     Hepatic:   Recent Labs     12/27/20  1115   *   ALT 45   BILITOT 0.7   ALKPHOS 85      Magnesium:    Lab Results   Component Value Date    MG 1.6 12/29/2020      HgBA1c:    Lab Results   Component Value Date    LABA1C 6.2 12/28/2020     TSH:    Lab Results   Component Value Date    TSH 1.250 12/28/2020     FOLATE:    Lab Results   Component Value Date    FOLATE 3.9 12/27/2020         Assessment and
Jack Osman called and updated on patient condition. She did not have HIPPA code. Patient gave ok to give HIPPA code to her.
12/28/2020     FOLATE:    Lab Results   Component Value Date    FOLATE 3.9 12/27/2020         Assessment and Plan:   1. Alcohol abuse with withdrawal  2. depression  3. HTN  4.  Folate def     Cont IVF hydration, thiamine, folic acid  protonix  antidepressants        Rhonda Rubin MD

## 2021-06-13 ENCOUNTER — HOSPITAL ENCOUNTER (INPATIENT)
Age: 46
LOS: 3 days | Discharge: HOME | End: 2021-06-17
Attending: EMERGENCY MEDICINE | Admitting: INTERNAL MEDICINE
Payer: MEDICAID

## 2021-06-13 DIAGNOSIS — S32.009A CLOSED FRACTURE OF TRANSVERSE PROCESS OF LUMBAR VERTEBRA, INITIAL ENCOUNTER (HCC): ICD-10-CM

## 2021-06-13 DIAGNOSIS — S32.059A CLOSED FRACTURE OF FIFTH LUMBAR VERTEBRA, UNSPECIFIED FRACTURE MORPHOLOGY, INITIAL ENCOUNTER (HCC): ICD-10-CM

## 2021-06-13 DIAGNOSIS — F10.930 ALCOHOL WITHDRAWAL SYNDROME WITHOUT COMPLICATION (HCC): Primary | ICD-10-CM

## 2021-06-13 PROBLEM — F10.939 ALCOHOL WITHDRAWAL SYNDROME WITH COMPLICATION, WITH UNSPECIFIED COMPLICATION (HCC): Status: ACTIVE | Noted: 2021-06-13

## 2021-06-13 LAB
ALBUMIN SERPL-MCNC: 4.3 G/DL (ref 3.5–5.1)
ALP BLD-CCNC: 98 U/L (ref 38–126)
ALT SERPL-CCNC: 27 U/L (ref 11–66)
ANION GAP SERPL CALCULATED.3IONS-SCNC: 20 MEQ/L (ref 8–16)
AST SERPL-CCNC: 32 U/L (ref 5–40)
BASE EXCESS MIXED: 0.4 MMOL/L (ref -2–3)
BASOPHILS # BLD: 0.4 %
BASOPHILS ABSOLUTE: 0 THOU/MM3 (ref 0–0.1)
BILIRUB SERPL-MCNC: 0.5 MG/DL (ref 0.3–1.2)
BUN BLDV-MCNC: 13 MG/DL (ref 7–22)
CALCIUM SERPL-MCNC: 9 MG/DL (ref 8.5–10.5)
CHLORIDE BLD-SCNC: 97 MEQ/L (ref 98–111)
CO2: 22 MEQ/L (ref 23–33)
COLLECTED BY:: ABNORMAL
CREAT SERPL-MCNC: 0.9 MG/DL (ref 0.4–1.2)
EKG ATRIAL RATE: 99 BPM
EKG P AXIS: 25 DEGREES
EKG P-R INTERVAL: 132 MS
EKG Q-T INTERVAL: 368 MS
EKG QRS DURATION: 84 MS
EKG QTC CALCULATION (BAZETT): 472 MS
EKG R AXIS: 58 DEGREES
EKG T AXIS: 55 DEGREES
EKG VENTRICULAR RATE: 99 BPM
EOSINOPHIL # BLD: 0.2 %
EOSINOPHILS ABSOLUTE: 0 THOU/MM3 (ref 0–0.4)
ERYTHROCYTE [DISTWIDTH] IN BLOOD BY AUTOMATED COUNT: 15.1 % (ref 11.5–14.5)
ERYTHROCYTE [DISTWIDTH] IN BLOOD BY AUTOMATED COUNT: 54.2 FL (ref 35–45)
ETHYL ALCOHOL, SERUM: 0.02 %
GFR SERPL CREATININE-BSD FRML MDRD: > 90 ML/MIN/1.73M2
GLUCOSE BLD-MCNC: 117 MG/DL (ref 70–108)
HCO3, MIXED: 24 MMOL/L (ref 23–28)
HCT VFR BLD CALC: 43.5 % (ref 42–52)
HEMOGLOBIN: 14.3 GM/DL (ref 14–18)
IMMATURE GRANS (ABS): 0.03 THOU/MM3 (ref 0–0.07)
IMMATURE GRANULOCYTES: 0.3 %
LACTIC ACID: 2.3 MMOL/L (ref 0.5–2)
LYMPHOCYTES # BLD: 18.3 %
LYMPHOCYTES ABSOLUTE: 1.8 THOU/MM3 (ref 1–4.8)
MCH RBC QN AUTO: 32 PG (ref 26–33)
MCHC RBC AUTO-ENTMCNC: 32.9 GM/DL (ref 32.2–35.5)
MCV RBC AUTO: 97.3 FL (ref 80–94)
MONOCYTES # BLD: 10.4 %
MONOCYTES ABSOLUTE: 1 THOU/MM3 (ref 0.4–1.3)
NUCLEATED RED BLOOD CELLS: 0 /100 WBC
O2 SAT, MIXED: 84 %
OSMOLALITY CALCULATION: 278.7 MOSMOL/KG (ref 275–300)
PCO2, MIXED VENOUS: 35 MMHG (ref 41–51)
PH, MIXED: 7.45 (ref 7.31–7.41)
PLATELET # BLD: 352 THOU/MM3 (ref 130–400)
PMV BLD AUTO: 9.8 FL (ref 9.4–12.4)
PO2 MIXED: 47 MMHG (ref 25–40)
POTASSIUM REFLEX MAGNESIUM: 3.9 MEQ/L (ref 3.5–5.2)
RBC # BLD: 4.47 MILL/MM3 (ref 4.7–6.1)
SEG NEUTROPHILS: 70.4 %
SEGMENTED NEUTROPHILS ABSOLUTE COUNT: 7 THOU/MM3 (ref 1.8–7.7)
SODIUM BLD-SCNC: 139 MEQ/L (ref 135–145)
TOTAL PROTEIN: 7.6 G/DL (ref 6.1–8)
WBC # BLD: 9.9 THOU/MM3 (ref 4.8–10.8)

## 2021-06-13 PROCEDURE — 82077 ASSAY SPEC XCP UR&BREATH IA: CPT

## 2021-06-13 PROCEDURE — 2580000003 HC RX 258: Performed by: EMERGENCY MEDICINE

## 2021-06-13 PROCEDURE — 6360000002 HC RX W HCPCS: Performed by: EMERGENCY MEDICINE

## 2021-06-13 PROCEDURE — 83605 ASSAY OF LACTIC ACID: CPT

## 2021-06-13 PROCEDURE — 99285 EMERGENCY DEPT VISIT HI MDM: CPT

## 2021-06-13 PROCEDURE — 93010 ELECTROCARDIOGRAM REPORT: CPT | Performed by: NUCLEAR MEDICINE

## 2021-06-13 PROCEDURE — 6370000000 HC RX 637 (ALT 250 FOR IP): Performed by: INTERNAL MEDICINE

## 2021-06-13 PROCEDURE — 80053 COMPREHEN METABOLIC PANEL: CPT

## 2021-06-13 PROCEDURE — 99223 1ST HOSP IP/OBS HIGH 75: CPT | Performed by: INTERNAL MEDICINE

## 2021-06-13 PROCEDURE — 96374 THER/PROPH/DIAG INJ IV PUSH: CPT

## 2021-06-13 PROCEDURE — 2580000003 HC RX 258: Performed by: INTERNAL MEDICINE

## 2021-06-13 PROCEDURE — 82803 BLOOD GASES ANY COMBINATION: CPT

## 2021-06-13 PROCEDURE — 36415 COLL VENOUS BLD VENIPUNCTURE: CPT

## 2021-06-13 PROCEDURE — 96376 TX/PRO/DX INJ SAME DRUG ADON: CPT

## 2021-06-13 PROCEDURE — 93005 ELECTROCARDIOGRAM TRACING: CPT | Performed by: EMERGENCY MEDICINE

## 2021-06-13 PROCEDURE — 6360000002 HC RX W HCPCS: Performed by: INTERNAL MEDICINE

## 2021-06-13 PROCEDURE — G0378 HOSPITAL OBSERVATION PER HR: HCPCS

## 2021-06-13 PROCEDURE — 85025 COMPLETE CBC W/AUTO DIFF WBC: CPT

## 2021-06-13 RX ORDER — LORAZEPAM 1 MG/1
3 TABLET ORAL
Status: DISCONTINUED | OUTPATIENT
Start: 2021-06-13 | End: 2021-06-17 | Stop reason: HOSPADM

## 2021-06-13 RX ORDER — POTASSIUM CHLORIDE 20 MEQ/1
40 TABLET, EXTENDED RELEASE ORAL PRN
Status: DISCONTINUED | OUTPATIENT
Start: 2021-06-13 | End: 2021-06-17 | Stop reason: HOSPADM

## 2021-06-13 RX ORDER — FOLIC ACID 1 MG/1
1 TABLET ORAL DAILY
Status: DISCONTINUED | OUTPATIENT
Start: 2021-06-14 | End: 2021-06-17 | Stop reason: HOSPADM

## 2021-06-13 RX ORDER — LORAZEPAM 1 MG/1
1 TABLET ORAL
Status: DISCONTINUED | OUTPATIENT
Start: 2021-06-13 | End: 2021-06-17 | Stop reason: HOSPADM

## 2021-06-13 RX ORDER — ONDANSETRON 2 MG/ML
4 INJECTION INTRAMUSCULAR; INTRAVENOUS EVERY 6 HOURS PRN
Status: DISCONTINUED | OUTPATIENT
Start: 2021-06-13 | End: 2021-06-17 | Stop reason: HOSPADM

## 2021-06-13 RX ORDER — ONDANSETRON 4 MG/1
4 TABLET, ORALLY DISINTEGRATING ORAL EVERY 8 HOURS PRN
Status: DISCONTINUED | OUTPATIENT
Start: 2021-06-13 | End: 2021-06-17 | Stop reason: HOSPADM

## 2021-06-13 RX ORDER — SODIUM CHLORIDE 0.9 % (FLUSH) 0.9 %
5-40 SYRINGE (ML) INJECTION EVERY 12 HOURS SCHEDULED
Status: DISCONTINUED | OUTPATIENT
Start: 2021-06-13 | End: 2021-06-17 | Stop reason: HOSPADM

## 2021-06-13 RX ORDER — LISINOPRIL 10 MG/1
10 TABLET ORAL DAILY
Status: DISCONTINUED | OUTPATIENT
Start: 2021-06-14 | End: 2021-06-17 | Stop reason: HOSPADM

## 2021-06-13 RX ORDER — PANTOPRAZOLE SODIUM 40 MG/1
40 TABLET, DELAYED RELEASE ORAL
Status: DISCONTINUED | OUTPATIENT
Start: 2021-06-14 | End: 2021-06-17 | Stop reason: HOSPADM

## 2021-06-13 RX ORDER — LORAZEPAM 2 MG/ML
2 INJECTION INTRAMUSCULAR
Status: DISCONTINUED | OUTPATIENT
Start: 2021-06-13 | End: 2021-06-17 | Stop reason: HOSPADM

## 2021-06-13 RX ORDER — ACETAMINOPHEN 325 MG/1
650 TABLET ORAL EVERY 6 HOURS PRN
Status: DISCONTINUED | OUTPATIENT
Start: 2021-06-13 | End: 2021-06-17 | Stop reason: HOSPADM

## 2021-06-13 RX ORDER — SODIUM CHLORIDE 9 MG/ML
25 INJECTION, SOLUTION INTRAVENOUS PRN
Status: DISCONTINUED | OUTPATIENT
Start: 2021-06-13 | End: 2021-06-17 | Stop reason: HOSPADM

## 2021-06-13 RX ORDER — LORAZEPAM 2 MG/ML
2 INJECTION INTRAMUSCULAR ONCE
Status: COMPLETED | OUTPATIENT
Start: 2021-06-13 | End: 2021-06-13

## 2021-06-13 RX ORDER — LANOLIN ALCOHOL/MO/W.PET/CERES
100 CREAM (GRAM) TOPICAL DAILY
Status: DISCONTINUED | OUTPATIENT
Start: 2021-06-14 | End: 2021-06-17 | Stop reason: HOSPADM

## 2021-06-13 RX ORDER — SODIUM CHLORIDE 0.9 % (FLUSH) 0.9 %
5-40 SYRINGE (ML) INJECTION PRN
Status: DISCONTINUED | OUTPATIENT
Start: 2021-06-13 | End: 2021-06-17 | Stop reason: HOSPADM

## 2021-06-13 RX ORDER — LORAZEPAM 2 MG/ML
3 INJECTION INTRAMUSCULAR
Status: DISCONTINUED | OUTPATIENT
Start: 2021-06-13 | End: 2021-06-17 | Stop reason: HOSPADM

## 2021-06-13 RX ORDER — POTASSIUM CHLORIDE 7.45 MG/ML
10 INJECTION INTRAVENOUS PRN
Status: DISCONTINUED | OUTPATIENT
Start: 2021-06-13 | End: 2021-06-17 | Stop reason: HOSPADM

## 2021-06-13 RX ORDER — 0.9 % SODIUM CHLORIDE 0.9 %
1000 INTRAVENOUS SOLUTION INTRAVENOUS ONCE
Status: COMPLETED | OUTPATIENT
Start: 2021-06-13 | End: 2021-06-13

## 2021-06-13 RX ORDER — SODIUM CHLORIDE 9 MG/ML
INJECTION, SOLUTION INTRAVENOUS CONTINUOUS
Status: DISCONTINUED | OUTPATIENT
Start: 2021-06-13 | End: 2021-06-15

## 2021-06-13 RX ORDER — LORAZEPAM 2 MG/ML
1 INJECTION INTRAMUSCULAR
Status: DISCONTINUED | OUTPATIENT
Start: 2021-06-13 | End: 2021-06-17 | Stop reason: HOSPADM

## 2021-06-13 RX ORDER — POLYETHYLENE GLYCOL 3350 17 G/17G
17 POWDER, FOR SOLUTION ORAL DAILY PRN
Status: DISCONTINUED | OUTPATIENT
Start: 2021-06-13 | End: 2021-06-17 | Stop reason: HOSPADM

## 2021-06-13 RX ORDER — LORAZEPAM 2 MG/ML
4 INJECTION INTRAMUSCULAR
Status: DISCONTINUED | OUTPATIENT
Start: 2021-06-13 | End: 2021-06-17 | Stop reason: HOSPADM

## 2021-06-13 RX ORDER — MULTIVITAMIN WITH IRON
1 TABLET ORAL DAILY
Status: DISCONTINUED | OUTPATIENT
Start: 2021-06-14 | End: 2021-06-17 | Stop reason: HOSPADM

## 2021-06-13 RX ORDER — LORAZEPAM 1 MG/1
4 TABLET ORAL
Status: DISCONTINUED | OUTPATIENT
Start: 2021-06-13 | End: 2021-06-17 | Stop reason: HOSPADM

## 2021-06-13 RX ORDER — LORAZEPAM 1 MG/1
2 TABLET ORAL
Status: DISCONTINUED | OUTPATIENT
Start: 2021-06-13 | End: 2021-06-17 | Stop reason: HOSPADM

## 2021-06-13 RX ORDER — ACETAMINOPHEN 650 MG/1
650 SUPPOSITORY RECTAL EVERY 6 HOURS PRN
Status: DISCONTINUED | OUTPATIENT
Start: 2021-06-13 | End: 2021-06-17 | Stop reason: HOSPADM

## 2021-06-13 RX ADMIN — SODIUM CHLORIDE: 9 INJECTION, SOLUTION INTRAVENOUS at 22:28

## 2021-06-13 RX ADMIN — SODIUM CHLORIDE 1000 ML: 9 INJECTION, SOLUTION INTRAVENOUS at 19:52

## 2021-06-13 RX ADMIN — LORAZEPAM 2 MG: 2 INJECTION INTRAMUSCULAR; INTRAVENOUS at 20:08

## 2021-06-13 RX ADMIN — LORAZEPAM 2 MG: 2 INJECTION INTRAMUSCULAR; INTRAVENOUS at 18:24

## 2021-06-13 RX ADMIN — SODIUM CHLORIDE, PRESERVATIVE FREE 10 ML: 5 INJECTION INTRAVENOUS at 22:28

## 2021-06-13 RX ADMIN — METOPROLOL TARTRATE 25 MG: 25 TABLET, FILM COATED ORAL at 22:28

## 2021-06-13 ASSESSMENT — PAIN SCALES - GENERAL
PAINLEVEL_OUTOF10: 10
PAINLEVEL_OUTOF10: 9

## 2021-06-13 ASSESSMENT — PAIN DESCRIPTION - DESCRIPTORS: DESCRIPTORS: SHARP

## 2021-06-13 ASSESSMENT — PAIN DESCRIPTION - PAIN TYPE: TYPE: ACUTE PAIN

## 2021-06-13 ASSESSMENT — PAIN DESCRIPTION - LOCATION: LOCATION: NECK;BACK

## 2021-06-13 NOTE — ED NOTES
PT stated that he is also having some withdraws from alcohol. PT has not had a drink since last night.       Tommie Caballero RN  06/13/21 [id number]

## 2021-06-13 NOTE — ED NOTES
Pt resting in bed with side rails up 2x2 and call light in reach. Vital signs assessed and stable. IV site checked. Pt updated on plan of care. Pt voiced understanding. Pt verbalized no other needs or concerns at this time.         Mauri Mark RN  06/13/21 2243
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
## 2021-06-14 ENCOUNTER — APPOINTMENT (OUTPATIENT)
Dept: MRI IMAGING | Age: 46
End: 2021-06-14
Payer: MEDICAID

## 2021-06-14 PROBLEM — Z78.9 WITHDRAWN FROM ALCOHOL DETOXIFICATION PROGRAM: Status: ACTIVE | Noted: 2021-06-14

## 2021-06-14 LAB
ALBUMIN SERPL-MCNC: 3.8 G/DL (ref 3.5–5.1)
ALP BLD-CCNC: 104 U/L (ref 38–126)
ALT SERPL-CCNC: 19 U/L (ref 11–66)
AMPHETAMINE+METHAMPHETAMINE URINE SCREEN: NEGATIVE
ANION GAP SERPL CALCULATED.3IONS-SCNC: 9 MEQ/L (ref 8–16)
AST SERPL-CCNC: 21 U/L (ref 5–40)
BARBITURATE QUANTITATIVE URINE: NEGATIVE
BASOPHILS # BLD: 0.4 %
BASOPHILS ABSOLUTE: 0 THOU/MM3 (ref 0–0.1)
BENZODIAZEPINE QUANTITATIVE URINE: POSITIVE
BILIRUB SERPL-MCNC: 1.1 MG/DL (ref 0.3–1.2)
BILIRUBIN DIRECT: 0.3 MG/DL (ref 0–0.3)
BUN BLDV-MCNC: 11 MG/DL (ref 7–22)
CALCIUM SERPL-MCNC: 8.5 MG/DL (ref 8.5–10.5)
CANNABINOID QUANTITATIVE URINE: NEGATIVE
CHLORIDE BLD-SCNC: 103 MEQ/L (ref 98–111)
CO2: 27 MEQ/L (ref 23–33)
COCAINE METABOLITE QUANTITATIVE URINE: NEGATIVE
CREAT SERPL-MCNC: 0.6 MG/DL (ref 0.4–1.2)
EOSINOPHIL # BLD: 2.7 %
EOSINOPHILS ABSOLUTE: 0.1 THOU/MM3 (ref 0–0.4)
ERYTHROCYTE [DISTWIDTH] IN BLOOD BY AUTOMATED COUNT: 14.8 % (ref 11.5–14.5)
ERYTHROCYTE [DISTWIDTH] IN BLOOD BY AUTOMATED COUNT: 53.7 FL (ref 35–45)
GFR SERPL CREATININE-BSD FRML MDRD: > 90 ML/MIN/1.73M2
GLUCOSE BLD-MCNC: 100 MG/DL (ref 70–108)
HCT VFR BLD CALC: 40.8 % (ref 42–52)
HEMOGLOBIN: 13.3 GM/DL (ref 14–18)
IMMATURE GRANS (ABS): 0.01 THOU/MM3 (ref 0–0.07)
IMMATURE GRANULOCYTES: 0.2 %
LYMPHOCYTES # BLD: 28 %
LYMPHOCYTES ABSOLUTE: 1.5 THOU/MM3 (ref 1–4.8)
MCH RBC QN AUTO: 32 PG (ref 26–33)
MCHC RBC AUTO-ENTMCNC: 32.6 GM/DL (ref 32.2–35.5)
MCV RBC AUTO: 98.3 FL (ref 80–94)
MONOCYTES # BLD: 13.5 %
MONOCYTES ABSOLUTE: 0.7 THOU/MM3 (ref 0.4–1.3)
NUCLEATED RED BLOOD CELLS: 0 /100 WBC
OPIATES, URINE: POSITIVE
OXYCODONE: NEGATIVE
PHENCYCLIDINE QUANTITATIVE URINE: NEGATIVE
PLATELET # BLD: 269 THOU/MM3 (ref 130–400)
PMV BLD AUTO: 9.9 FL (ref 9.4–12.4)
POTASSIUM REFLEX MAGNESIUM: 4 MEQ/L (ref 3.5–5.2)
RBC # BLD: 4.15 MILL/MM3 (ref 4.7–6.1)
SEG NEUTROPHILS: 55.2 %
SEGMENTED NEUTROPHILS ABSOLUTE COUNT: 2.9 THOU/MM3 (ref 1.8–7.7)
SODIUM BLD-SCNC: 139 MEQ/L (ref 135–145)
TOTAL PROTEIN: 6.1 G/DL (ref 6.1–8)
WBC # BLD: 5.2 THOU/MM3 (ref 4.8–10.8)

## 2021-06-14 PROCEDURE — 80076 HEPATIC FUNCTION PANEL: CPT

## 2021-06-14 PROCEDURE — 6360000002 HC RX W HCPCS: Performed by: INTERNAL MEDICINE

## 2021-06-14 PROCEDURE — 85025 COMPLETE CBC W/AUTO DIFF WBC: CPT

## 2021-06-14 PROCEDURE — 80048 BASIC METABOLIC PNL TOTAL CA: CPT

## 2021-06-14 PROCEDURE — 36415 COLL VENOUS BLD VENIPUNCTURE: CPT

## 2021-06-14 PROCEDURE — 6370000000 HC RX 637 (ALT 250 FOR IP): Performed by: INTERNAL MEDICINE

## 2021-06-14 PROCEDURE — 99222 1ST HOSP IP/OBS MODERATE 55: CPT | Performed by: NEUROLOGICAL SURGERY

## 2021-06-14 PROCEDURE — G0378 HOSPITAL OBSERVATION PER HR: HCPCS

## 2021-06-14 PROCEDURE — 2580000003 HC RX 258: Performed by: INTERNAL MEDICINE

## 2021-06-14 PROCEDURE — 72148 MRI LUMBAR SPINE W/O DYE: CPT

## 2021-06-14 PROCEDURE — 1200000000 HC SEMI PRIVATE

## 2021-06-14 PROCEDURE — 99232 SBSQ HOSP IP/OBS MODERATE 35: CPT | Performed by: NURSE PRACTITIONER

## 2021-06-14 PROCEDURE — 80307 DRUG TEST PRSMV CHEM ANLYZR: CPT

## 2021-06-14 PROCEDURE — 72146 MRI CHEST SPINE W/O DYE: CPT

## 2021-06-14 PROCEDURE — 6370000000 HC RX 637 (ALT 250 FOR IP): Performed by: NURSE PRACTITIONER

## 2021-06-14 PROCEDURE — 6820000001 HC L2 TRAUMA SURGERY EVALUATION: Performed by: NEUROLOGICAL SURGERY

## 2021-06-14 RX ORDER — HYDROCODONE BITARTRATE AND ACETAMINOPHEN 5; 325 MG/1; MG/1
1 TABLET ORAL EVERY 6 HOURS PRN
Status: DISCONTINUED | OUTPATIENT
Start: 2021-06-14 | End: 2021-06-16

## 2021-06-14 RX ORDER — LIDOCAINE 4 G/G
3 PATCH TOPICAL DAILY
Status: DISCONTINUED | OUTPATIENT
Start: 2021-06-14 | End: 2021-06-17 | Stop reason: HOSPADM

## 2021-06-14 RX ADMIN — METOPROLOL TARTRATE 25 MG: 25 TABLET, FILM COATED ORAL at 09:45

## 2021-06-14 RX ADMIN — PANTOPRAZOLE SODIUM 40 MG: 40 TABLET, DELAYED RELEASE ORAL at 06:20

## 2021-06-14 RX ADMIN — LORAZEPAM 2 MG: 2 INJECTION INTRAMUSCULAR; INTRAVENOUS at 06:08

## 2021-06-14 RX ADMIN — ENOXAPARIN SODIUM 40 MG: 40 INJECTION SUBCUTANEOUS at 09:46

## 2021-06-14 RX ADMIN — Medication 100 MG: at 09:45

## 2021-06-14 RX ADMIN — Medication 1 TABLET: at 09:45

## 2021-06-14 RX ADMIN — METOPROLOL TARTRATE 25 MG: 25 TABLET, FILM COATED ORAL at 21:15

## 2021-06-14 RX ADMIN — FOLIC ACID 1 MG: 1 TABLET ORAL at 09:46

## 2021-06-14 RX ADMIN — SODIUM CHLORIDE: 9 INJECTION, SOLUTION INTRAVENOUS at 12:23

## 2021-06-14 RX ADMIN — LORAZEPAM 3 MG: 1 TABLET ORAL at 22:53

## 2021-06-14 RX ADMIN — ACETAMINOPHEN 650 MG: 325 TABLET ORAL at 07:50

## 2021-06-14 RX ADMIN — ESCITALOPRAM 30 MG: 20 TABLET, FILM COATED ORAL at 12:23

## 2021-06-14 RX ADMIN — LORAZEPAM 3 MG: 2 INJECTION INTRAMUSCULAR; INTRAVENOUS at 16:41

## 2021-06-14 RX ADMIN — SODIUM CHLORIDE: 9 INJECTION, SOLUTION INTRAVENOUS at 05:25

## 2021-06-14 RX ADMIN — SODIUM CHLORIDE: 9 INJECTION, SOLUTION INTRAVENOUS at 21:14

## 2021-06-14 RX ADMIN — LISINOPRIL 10 MG: 10 TABLET ORAL at 09:46

## 2021-06-14 ASSESSMENT — ENCOUNTER SYMPTOMS
TROUBLE SWALLOWING: 0
CHEST TIGHTNESS: 0
BACK PAIN: 1
ABDOMINAL PAIN: 0

## 2021-06-14 ASSESSMENT — PAIN SCALES - GENERAL
PAINLEVEL_OUTOF10: 8
PAINLEVEL_OUTOF10: 10
PAINLEVEL_OUTOF10: 10

## 2021-06-14 ASSESSMENT — PATIENT HEALTH QUESTIONNAIRE - PHQ9: SUM OF ALL RESPONSES TO PHQ QUESTIONS 1-9: 22
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
## 2021-06-14 NOTE — ED NOTES
ED to inpatient nurses report    Chief Complaint   Patient presents with    Neck Pain    Back Pain    Chest Pain      Present to ED from home  LOC: alert and orientated to name, place, date  Vital signs   Vitals:    06/13/21 1657 06/13/21 1823 06/13/21 1955   BP: (!) 154/108 (!) 138/98 (!) 157/105   Pulse: 102 105 105   Resp: 16     Temp: 98.2 °F (36.8 °C)     TempSrc: Oral     SpO2: 97% 97%    Weight: 220 lb (99.8 kg)     Height: 5' 10\" (1.778 m)        Oxygen Baseline room air     Current needs required none Bipap/Cpap No  LDAs:   Peripheral IV 06/13/21 Left Antecubital (Active)   Site Assessment Clean; Intact;Dry 06/13/21 1823   Line Status Infusing 06/13/21 1823   Dressing Status Clean;Dry; Intact 06/13/21 1823   Dressing Intervention New 06/13/21 1710     Mobility: Requires assistance * 1  Pending ED orders: None  Present condition: Stable     Electronically signed by JELENA Montano on 6/13/2021 at 8:01 PM       JELENA Montano  06/13/21 2002

## 2021-06-15 PROCEDURE — APPSS30 APP SPLIT SHARED TIME 16-30 MINUTES: Performed by: PHYSICIAN ASSISTANT

## 2021-06-15 PROCEDURE — 6360000002 HC RX W HCPCS: Performed by: INTERNAL MEDICINE

## 2021-06-15 PROCEDURE — 1200000000 HC SEMI PRIVATE

## 2021-06-15 PROCEDURE — 99232 SBSQ HOSP IP/OBS MODERATE 35: CPT | Performed by: NEUROLOGICAL SURGERY

## 2021-06-15 PROCEDURE — 6370000000 HC RX 637 (ALT 250 FOR IP): Performed by: PHYSICIAN ASSISTANT

## 2021-06-15 PROCEDURE — 99232 SBSQ HOSP IP/OBS MODERATE 35: CPT | Performed by: NURSE PRACTITIONER

## 2021-06-15 PROCEDURE — 6370000000 HC RX 637 (ALT 250 FOR IP): Performed by: INTERNAL MEDICINE

## 2021-06-15 PROCEDURE — 2580000003 HC RX 258: Performed by: INTERNAL MEDICINE

## 2021-06-15 RX ADMIN — SODIUM CHLORIDE: 9 INJECTION, SOLUTION INTRAVENOUS at 04:31

## 2021-06-15 RX ADMIN — LORAZEPAM 4 MG: 1 TABLET ORAL at 21:20

## 2021-06-15 RX ADMIN — Medication 1 TABLET: at 09:21

## 2021-06-15 RX ADMIN — ESCITALOPRAM 30 MG: 20 TABLET, FILM COATED ORAL at 09:22

## 2021-06-15 RX ADMIN — HYDROCODONE BITARTRATE AND ACETAMINOPHEN 1 TABLET: 5; 325 TABLET ORAL at 18:32

## 2021-06-15 RX ADMIN — SODIUM CHLORIDE, PRESERVATIVE FREE 10 ML: 5 INJECTION INTRAVENOUS at 21:21

## 2021-06-15 RX ADMIN — METOPROLOL TARTRATE 25 MG: 25 TABLET, FILM COATED ORAL at 21:20

## 2021-06-15 RX ADMIN — HYDROCODONE BITARTRATE AND ACETAMINOPHEN 1 TABLET: 5; 325 TABLET ORAL at 12:15

## 2021-06-15 RX ADMIN — HYDROCODONE BITARTRATE AND ACETAMINOPHEN 1 TABLET: 5; 325 TABLET ORAL at 00:12

## 2021-06-15 RX ADMIN — FOLIC ACID 1 MG: 1 TABLET ORAL at 09:22

## 2021-06-15 RX ADMIN — LORAZEPAM 4 MG: 2 INJECTION INTRAMUSCULAR; INTRAVENOUS at 09:57

## 2021-06-15 RX ADMIN — METOPROLOL TARTRATE 25 MG: 25 TABLET, FILM COATED ORAL at 09:22

## 2021-06-15 RX ADMIN — Medication 100 MG: at 09:21

## 2021-06-15 RX ADMIN — ENOXAPARIN SODIUM 40 MG: 40 INJECTION SUBCUTANEOUS at 09:22

## 2021-06-15 RX ADMIN — PANTOPRAZOLE SODIUM 40 MG: 40 TABLET, DELAYED RELEASE ORAL at 05:20

## 2021-06-15 RX ADMIN — LISINOPRIL 10 MG: 10 TABLET ORAL at 09:22

## 2021-06-15 RX ADMIN — HYDROCODONE BITARTRATE AND ACETAMINOPHEN 1 TABLET: 5; 325 TABLET ORAL at 06:13

## 2021-06-15 ASSESSMENT — PAIN DESCRIPTION - ONSET
ONSET: ON-GOING

## 2021-06-15 ASSESSMENT — PAIN DESCRIPTION - DIRECTION
RADIATING_TOWARDS: NECK

## 2021-06-15 ASSESSMENT — PAIN - FUNCTIONAL ASSESSMENT
PAIN_FUNCTIONAL_ASSESSMENT: ACTIVITIES ARE NOT PREVENTED

## 2021-06-15 ASSESSMENT — PAIN DESCRIPTION - FREQUENCY
FREQUENCY: CONTINUOUS

## 2021-06-15 ASSESSMENT — PAIN DESCRIPTION - DESCRIPTORS
DESCRIPTORS: SHARP

## 2021-06-15 ASSESSMENT — PAIN SCALES - GENERAL
PAINLEVEL_OUTOF10: 8
PAINLEVEL_OUTOF10: 4
PAINLEVEL_OUTOF10: 8

## 2021-06-15 ASSESSMENT — PAIN DESCRIPTION - PROGRESSION
CLINICAL_PROGRESSION: NOT CHANGED

## 2021-06-15 ASSESSMENT — PAIN DESCRIPTION - ORIENTATION
ORIENTATION: MID

## 2021-06-15 ASSESSMENT — PAIN DESCRIPTION - PAIN TYPE
TYPE: ACUTE PAIN

## 2021-06-15 ASSESSMENT — PAIN DESCRIPTION - LOCATION
LOCATION: BACK

## 2021-06-15 NOTE — PROGRESS NOTES
Addendum by Dr. Majo Briones MD:  I have seen and examined the patient independently. Face to face evaluation and examination was performed. The below   evaluation and note have been reviewed. Labs and radiographs were reviewed. I Have discussed with Neurosurgery PA about this patient in detail. The above assessment and plan have been reviewed. Please see my modifications mentioned below. My additional comments and modifications:    · There is no acute event over the night. · There is no change in pt's neurological exam comparing with yesterday. · T/L spine MRI showed:    Acute superior endplate compression deformity at T5 with approximate 24%    height loss centrally.  No retropulsion. · Pain control. · PT and OT  · If pt doses well with PT and OT and his pain is tolerable he can be discharge from neurosurgical prospective and follow up visit with neurosurgery clinic after a couple weeks for re-evalation. Majo Briones MD    Neurosurgery Progress Note    Patient:  Jared Dasilva [facility]      Unit/Bed:8B-36/036-A    YOB: 1975    MRN: [id number]     Acct: [de-identified]     Admit date: 6/13/2021    Chief Complaint   Patient presents with    Neck Pain    Back Pain    Chest Pain       Patient Seen, Chart, Physician notes, Labs, Radiology studies reviewed. Subjective: The patient is seen and evaluated on the floor with evaluation exam findings reviewed and discussed with Dr. Fabrice Marques and with nursing. Patient is resting comfortably today with pain well-controlled    Past, Family, Social History unchanged from admission. Diet:  ADULT DIET;  Regular    Medications:  Scheduled Meds:   lidocaine  3 patch Transdermal Daily    escitalopram  30 mg Oral Daily    folic acid  1 mg Oral Daily    lisinopril  10 mg Oral Daily    metoprolol tartrate  25 mg Oral BID    multivitamin  1 tablet Oral Daily    pantoprazole  40 mg Oral QAM AC    thiamine  100 mg Oral Daily    sodium chloride flush  5-40 mL Intravenous 2 times per day    enoxaparin  40 mg Subcutaneous Daily     Continuous Infusions:   sodium chloride       PRN Meds:HYDROcodone 5 mg - acetaminophen, sodium chloride flush, sodium chloride, ondansetron **OR** ondansetron, polyethylene glycol, acetaminophen **OR** acetaminophen, potassium chloride **OR** potassium alternative oral replacement **OR** potassium chloride, LORazepam **OR** LORazepam **OR** LORazepam **OR** LORazepam **OR** LORazepam **OR** LORazepam **OR** LORazepam **OR** LORazepam    Objective: Patient is observed lying in bed with head of the bed elevated 30 degrees with pain well-controlled. He remains stable and intact his baseline neurologically with no significant changes noted to the patient chart overnight. Vitals: BP (!) 149/93   Pulse 81   Temp 98 °F (36.7 °C) (Oral)   Resp 16   Ht 5' 10\" (1.778 m)   Wt 210 lb 4.8 oz (95.4 kg)   SpO2 96%   BMI 30.17 kg/m²   Physical Exam:  Alert and attentive. Language appropriate, with no aphasia. Pupils equal.  Facial strength symmetric. Physical Exam  Patient remained stable and grossly intact neurologically to his baseline with no additional changes noted to the patient chart overnight. Pain was well controlled at the time of exam.    ROS:  Review of Systems  Patient denies headache, nausea or vomiting, chest pain or shortness of breath. 24 hour intake/output:    Intake/Output Summary (Last 24 hours) at 6/15/2021 1646  Last data filed at 6/15/2021 1249  Gross per 24 hour   Intake 300 ml   Output 1000 ml   Net -700 ml     Last 3 weights:   Wt Readings from Last 3 Encounters:   06/15/21 210 lb 4.8 oz (95.4 kg)   12/29/20 224 lb (101.6 kg)   03/10/19 222 lb 3.2 oz (100.8 kg)         CBC:   Recent Labs     06/13/21  1730 06/14/21  0708   WBC 9.9 5.2   HGB 14.3 13.3*    269     BMP:    Recent Labs     06/13/21  1730 06/14/21  0708    139   K 3.9 4.0   CL 97* 103   CO2 22* 27   BUN 13 11 findings reviewed and discussed with Dr. Crystal Chase with nursing. Patient is resting comfortably today with pain well-controlled at the time of exam and remains stable and grossly intact to his baseline with no additional changes noted to the patient chart overnight. MRIs of the lumbar and thoracic spine are reviewed with a lumbar image revealing mild degenerative grade 1 retrolisthesis of L5 on S1 with no acute compression deformities noted. The thoracic image is significant for acute superior endplate compression deformity at T5 with approximately 24% height loss centrally with no retropulsion. Neurosurgery recommends PT with an emphasis on mobilization to ascertain level of discomfort with movement. A brief discussion was had with the patient involving the possibility of surgical intervention in the form of a kyphoplasty to address the endplate deformity of T5 should ambulation proved to be too uncomfortable. Based on his performance with physical therapy a more detailed discussion will be had regarding options moving forward tomorrow.   Neurosurgery to follow    Electronically signed by Home Lazo PA-C on 6/15/2021 at 4:46 PM

## 2021-06-15 NOTE — PLAN OF CARE
additional discharge needs. Care plan reviewed with patient. Patient verbalizes understanding of the plan of care and contributes to goal setting.

## 2021-06-15 NOTE — PROGRESS NOTES
Comprehensive Nutrition Assessment    Type and Reason for Visit:  Initial, Positive Nutrition Screen (Weight Loss/Decreased Appetite/Intake/Nausea/Vomiting)    Nutrition Recommendations/Plan:  *Continue current diet. *Continue Thiamine, Folic Acid and Multivitamin w/minerals daily. *Pt declines all ONS during LOS. Nutrition Assessment: Pt. nutritionally compromised AEB pt report of decreased appetite and intake of meals over the past few weeks consuming only one meal daily with unplanned weight loss of -6.3% in 6 months. At risk for further nutrition compromise r/t admit d/t alcohol withdrawal syndrome, underlying medical condition (hx Alcohol Abuse, HTN, HLD, Liver Disease). Nutrition recommendations/interventions as per above. Malnutrition Assessment:  Malnutrition Status: At risk for malnutrition (Comment)    Context:  Acute Illness     Findings of the 6 clinical characteristics of malnutrition:  Energy Intake:  7 - 50% or less of estimated energy requirements for 5 or more days  Weight Loss:  No significant weight loss (-6.3% weight loss in 6 months per EMR)     Body Fat Loss:  No significant body fat loss     Muscle Mass Loss:  No significant muscle mass loss    Fluid Accumulation:  No significant fluid accumulation Extremities   Strength:  Not Performed    Estimated Daily Nutrient Needs:  Energy (kcal):  5256-8676 kcal/day (15-18 kcal/kg); Weight Used for Energy Requirements:   (95.4 kg on 6/15)     Protein (g):   g/day (1.2-1.5 g/kg) (IBW 75.5 kg); Weight Used for Protein Requirements:   (IBW 75.5 kg)          Nutrition Related Findings:  admit d/t alcohol withdrawl syndrome; pt seen; he reports decreased appetite consuming only one meal daily over the past few weeks and usually consumes two meals/day. Pt reports weight loss of 10-15 lbs over the past 6 months; pt reports some nausea today but no vomiting; pt denies difficulty chewing/swallowing food; no BM yet.  Pt declines all ONS during LOS. Pt states he wants to lose weight and is happy with the weight loss. Rx includes: Folic Acid, Thiamine and Multivitamin. Wounds:  None       Current Nutrition Therapies:    ADULT DIET; Regular    Anthropometric Measures:  · Height: 5' 10\" (177.8 cm)  · Current Body Weight: 210 lb 4.8 oz (95.4 kg) (6/15; no edema noted)   · Admission Body Weight: 210 lb 4.8 oz (95.4 kg) (6/15; no edema noted)    · Usual Body Weight:  (Per EMR:)     · Ideal Body Weight: 166 lbs  · BMI: 30.2  · BMI Categories: Obese Class 1 (BMI 30.0-34. 9)       Nutrition Diagnosis:   · Inadequate oral intake related to inadequate protein-energy intake as evidenced by poor intake prior to admission, weight loss (-6.3% weight loss in 6 months)    Nutrition Interventions:   Food and/or Nutrient Delivery:  Continue Current Diet, Vitamin Supplement (Pt declines all ONS during LOS)  Nutrition Education/Counseling:  Education initiated (Encouraged po intake of meals at best effort)   Coordination of Nutrition Care:  Continue to monitor while inpatient    Goals:  Pt will consume 75% or more of meals during LOS       Nutrition Monitoring and Evaluation:   Behavioral-Environmental Outcomes:  None Identified   Food/Nutrient Intake Outcomes:  Food and Nutrient Intake, Vitamin/Mineral Intake, Diet Advancement/Tolerance  Physical Signs/Symptoms Outcomes:  Biochemical Data, GI Status, Nausea or Vomiting, Weight, Skin, Nutrition Focused Physical Findings, Fluid Status or Edema     Discharge Planning:     Too soon to determine     Electronically signed by Lupe Dotson, MS, RD, LD on 6/15/21 at 10:43 AM EDT    Contact: [phone]

## 2021-06-15 NOTE — PROGRESS NOTES
folic acid  1 mg Oral Daily    lisinopril  10 mg Oral Daily    metoprolol tartrate  25 mg Oral BID    multivitamin  1 tablet Oral Daily    pantoprazole  40 mg Oral QAM AC    thiamine  100 mg Oral Daily    sodium chloride flush  5-40 mL Intravenous 2 times per day    enoxaparin  40 mg Subcutaneous Daily     PRN Meds: HYDROcodone 5 mg - acetaminophen, sodium chloride flush, sodium chloride, ondansetron **OR** ondansetron, polyethylene glycol, acetaminophen **OR** acetaminophen, potassium chloride **OR** potassium alternative oral replacement **OR** potassium chloride, LORazepam **OR** LORazepam **OR** LORazepam **OR** LORazepam **OR** LORazepam **OR** LORazepam **OR** LORazepam **OR** LORazepam      Intake/Output Summary (Last 24 hours) at 6/15/2021 1224  Last data filed at 6/15/2021 1057  Gross per 24 hour   Intake 2806.16 ml   Output 650 ml   Net 2156.16 ml       Diet:  ADULT DIET; Regular    Exam:  /73   Pulse 81   Temp 98 °F (36.7 °C) (Oral)   Resp 18   Ht 5' 10\" (1.778 m)   Wt 210 lb 4.8 oz (95.4 kg)   SpO2 96%   BMI 30.17 kg/m²     General appearance: No apparent distress, appears stated age and cooperative. HEENT: Pupils equal, round, and reactive to light. Conjunctivae/corneas clear. Neck: Supple, with full range of motion. No jugular venous distention. Trachea midline. Respiratory:  Normal respiratory effort. Clear to auscultation, bilaterally without Rales/Wheezes/Rhonchi. Cardiovascular: Regular rate and rhythm with normal S1/S2 without murmurs, rubs or gallops. Abdomen: Soft, non-tender, non-distended with normal bowel sounds. Musculoskeletal: passive and active ROM x 4 extremities. Skin: Skin color, texture, turgor normal.    Neurologic:  Neurovascularly intact without any focal sensory/motor deficits. Cranial nerves: II-XII intact, grossly non-focal. + tremor, mild, improved.    Psychiatric: Alert and oriented, thought content appropriate  Capillary Refill: Brisk,< 3 seconds Peripheral Pulses: +2 palpable, equal bilaterally       Labs:   Recent Labs     06/13/21  1730 06/14/21  0708   WBC 9.9 5.2   HGB 14.3 13.3*   HCT 43.5 40.8*    269     Recent Labs     06/13/21  1730 06/14/21  0708    139   K 3.9 4.0   CL 97* 103   CO2 22* 27   BUN 13 11   CREATININE 0.9 0.6   CALCIUM 9.0 8.5     Recent Labs     06/13/21  1730 06/14/21  0708   AST 32 21   ALT 27 19   BILIDIR  --  0.3   BILITOT 0.5 1.1   ALKPHOS 98 104     No results for input(s): INR in the last 72 hours. No results for input(s): Josie Teresa in the last 72 hours. No results for input(s): PROCAL in the last 72 hours. Microbiology:      Urinalysis:      Lab Results   Component Value Date    NITRU NEGATIVE 03/05/2019    WBCUA 0-2 03/05/2019    BACTERIA NONE 03/05/2019    RBCUA 0-2 03/05/2019    BLOODU NEGATIVE 03/05/2019    SPECGRAV 1.028 05/18/2014    GLUCOSEU NEGATIVE 03/05/2019       Radiology:  No results found.          Code Status: Full Code    Tele:   [x] yes             [] no    Active Hospital Problems    Diagnosis Date Noted    Withdrawn from alcohol detoxification program [Z78.9] 06/14/2021    Alcohol withdrawal syndrome with complication, with unspecified complication [facility]) [C69.882] 06/13/2021       Electronically signed by ELISE Ventura CNP on 6/15/2021 at 12:24 PM

## 2021-06-15 NOTE — CARE COORDINATION
6/15/21, 11:39 AM EDT    DISCHARGE PLANNING EVALUATION      Admitting diagnosis: Alcohol Withdrawal Syndrome  Currently in treatment/where: no  Currently taking subutex/soboxone/vivitrol: no  If yes, who prescribes: n/a  Housing at discharge: Yes, will be living with parents  Given Housing List: no  Given Treatment Center List: [facility] saw patient, gave treatment list.   Agreeable to treatment, if yes where: Patient stated he will follow up with either Adrian Esparza or Dr Renaldo Joe clinic. He will decided after discharge.   Insurance: Payor: [employer] / Plan: [employer] PLAN / Product Type: *No Product type* /    Medication Assistance: no  Transportation Assistance: no

## 2021-06-16 PROCEDURE — 6370000000 HC RX 637 (ALT 250 FOR IP): Performed by: INTERNAL MEDICINE

## 2021-06-16 PROCEDURE — 99232 SBSQ HOSP IP/OBS MODERATE 35: CPT | Performed by: NEUROLOGICAL SURGERY

## 2021-06-16 PROCEDURE — 6370000000 HC RX 637 (ALT 250 FOR IP): Performed by: PHYSICIAN ASSISTANT

## 2021-06-16 PROCEDURE — 99233 SBSQ HOSP IP/OBS HIGH 50: CPT | Performed by: INTERNAL MEDICINE

## 2021-06-16 PROCEDURE — 6360000002 HC RX W HCPCS: Performed by: INTERNAL MEDICINE

## 2021-06-16 PROCEDURE — APPSS30 APP SPLIT SHARED TIME 16-30 MINUTES: Performed by: PHYSICIAN ASSISTANT

## 2021-06-16 PROCEDURE — 1200000000 HC SEMI PRIVATE

## 2021-06-16 PROCEDURE — 2580000003 HC RX 258: Performed by: INTERNAL MEDICINE

## 2021-06-16 RX ORDER — HYDROCODONE BITARTRATE AND ACETAMINOPHEN 5; 325 MG/1; MG/1
2 TABLET ORAL EVERY 4 HOURS PRN
Status: DISCONTINUED | OUTPATIENT
Start: 2021-06-16 | End: 2021-06-17 | Stop reason: HOSPADM

## 2021-06-16 RX ORDER — HYDROCODONE BITARTRATE AND ACETAMINOPHEN 5; 325 MG/1; MG/1
1 TABLET ORAL EVERY 4 HOURS PRN
Status: DISCONTINUED | OUTPATIENT
Start: 2021-06-16 | End: 2021-06-17 | Stop reason: HOSPADM

## 2021-06-16 RX ADMIN — SODIUM CHLORIDE, PRESERVATIVE FREE 10 ML: 5 INJECTION INTRAVENOUS at 20:46

## 2021-06-16 RX ADMIN — HYDROCODONE BITARTRATE AND ACETAMINOPHEN 1 TABLET: 5; 325 TABLET ORAL at 14:40

## 2021-06-16 RX ADMIN — HYDROCODONE BITARTRATE AND ACETAMINOPHEN 1 TABLET: 5; 325 TABLET ORAL at 07:22

## 2021-06-16 RX ADMIN — HYDROCODONE BITARTRATE AND ACETAMINOPHEN 1 TABLET: 5; 325 TABLET ORAL at 00:35

## 2021-06-16 RX ADMIN — LORAZEPAM 4 MG: 1 TABLET ORAL at 20:43

## 2021-06-16 RX ADMIN — SODIUM CHLORIDE, PRESERVATIVE FREE 10 ML: 5 INJECTION INTRAVENOUS at 09:19

## 2021-06-16 RX ADMIN — PANTOPRAZOLE SODIUM 40 MG: 40 TABLET, DELAYED RELEASE ORAL at 06:18

## 2021-06-16 RX ADMIN — Medication 1 TABLET: at 09:15

## 2021-06-16 RX ADMIN — HYDROCODONE BITARTRATE AND ACETAMINOPHEN 2 TABLET: 5; 325 TABLET ORAL at 22:55

## 2021-06-16 RX ADMIN — METOPROLOL TARTRATE 25 MG: 25 TABLET, FILM COATED ORAL at 09:15

## 2021-06-16 RX ADMIN — LISINOPRIL 10 MG: 10 TABLET ORAL at 09:14

## 2021-06-16 RX ADMIN — METOPROLOL TARTRATE 25 MG: 25 TABLET, FILM COATED ORAL at 20:43

## 2021-06-16 RX ADMIN — Medication 100 MG: at 09:14

## 2021-06-16 RX ADMIN — HYDROCODONE BITARTRATE AND ACETAMINOPHEN 2 TABLET: 5; 325 TABLET ORAL at 18:55

## 2021-06-16 RX ADMIN — ESCITALOPRAM 30 MG: 20 TABLET, FILM COATED ORAL at 09:15

## 2021-06-16 RX ADMIN — LORAZEPAM 4 MG: 2 INJECTION INTRAMUSCULAR; INTRAVENOUS at 09:15

## 2021-06-16 RX ADMIN — FOLIC ACID 1 MG: 1 TABLET ORAL at 09:14

## 2021-06-16 ASSESSMENT — PAIN DESCRIPTION - DIRECTION
RADIATING_TOWARDS: NECK
RADIATING_TOWARDS: NECK

## 2021-06-16 ASSESSMENT — PAIN SCALES - GENERAL
PAINLEVEL_OUTOF10: 8
PAINLEVEL_OUTOF10: 8
PAINLEVEL_OUTOF10: 0
PAINLEVEL_OUTOF10: 7
PAINLEVEL_OUTOF10: 9
PAINLEVEL_OUTOF10: 6
PAINLEVEL_OUTOF10: 5
PAINLEVEL_OUTOF10: 8
PAINLEVEL_OUTOF10: 0
PAINLEVEL_OUTOF10: 9

## 2021-06-16 ASSESSMENT — PAIN DESCRIPTION - LOCATION
LOCATION: BACK

## 2021-06-16 ASSESSMENT — PAIN DESCRIPTION - PROGRESSION
CLINICAL_PROGRESSION: NOT CHANGED

## 2021-06-16 ASSESSMENT — PAIN DESCRIPTION - ORIENTATION
ORIENTATION: MID

## 2021-06-16 ASSESSMENT — PAIN DESCRIPTION - ONSET
ONSET: ON-GOING

## 2021-06-16 ASSESSMENT — PAIN DESCRIPTION - PAIN TYPE
TYPE: ACUTE PAIN

## 2021-06-16 ASSESSMENT — PAIN DESCRIPTION - FREQUENCY
FREQUENCY: CONTINUOUS

## 2021-06-16 ASSESSMENT — PAIN DESCRIPTION - DESCRIPTORS
DESCRIPTORS: SHARP

## 2021-06-16 NOTE — CARE COORDINATION
6/16/21, 2:46 PM EDT    DISCHARGE ON GOING [address] [facility] day: 2  Location: [address] [place] [address] Reason for admit: Alcohol withdrawal syndrome with complication, with unspecified complication ([facility] [Y57.561]  Withdrawn from alcohol detoxification program [Z78.9]   Procedure: No.   Barriers to Discharge: Hallucinating last pm. Elicia Adame for pain. Neuro states ok for discharge from their perspective. Using Ativan for symptoms. PCP: Demetra Blevins  Readmission Risk Score: 14%  Patient Goals/Plan/Treatment Preferences: Plans home with his parents.  Pt has received addictions resources from [facility]FF[facility] OF [facility] team.

## 2021-06-16 NOTE — PROGRESS NOTES
Addendum by Dr. Majo Briones MD:  I have seen and examined the patient independently. Face to face evaluation and examination was performed. The below   evaluation and note have been reviewed. Labs and radiographs were reviewed. I Have discussed with Neurosurgery PA about this patient in detail. The above assessment and plan have been reviewed. Please see my modifications mentioned below.      My additional comments and modifications:    · Patient stated that his pain is better today. · He did good with PT and OT. · Pain control. · Pt  can be discharge from neurosurgical prospective with follow up visit with neurosurgery clinic after a couple weeks for re-evalation. · The case and the treatment plan was discussed with pt and his nurse.   Triny Robertson MD        Neurosurgery Progress Note    Patient:  Jared Dasilva [facility]      Unit/Bed:8B-36/036-A    YOB: 1975    MRN: [id number]     Acct: [de-identified]     Admit date: 6/13/2021    Chief Complaint   Patient presents with    Neck Pain    Back Pain    Chest Pain       Patient Seen, Chart, Physician notes, Labs, Radiology studies reviewed. Subjective: The patient is seen and evaluated on the floor with evaluation and exam findings reviewed and discussed with Dr. Fabrice Marques and with nursing. Patient is resting comfortably with pain moderately controlled. Past, Family, Social History unchanged from admission. Diet:  ADULT DIET;  Regular    Medications:  Scheduled Meds:   lidocaine  3 patch Transdermal Daily    escitalopram  30 mg Oral Daily    folic acid  1 mg Oral Daily    lisinopril  10 mg Oral Daily    metoprolol tartrate  25 mg Oral BID    multivitamin  1 tablet Oral Daily    pantoprazole  40 mg Oral QAM AC    thiamine  100 mg Oral Daily    sodium chloride flush  5-40 mL Intravenous 2 times per day    enoxaparin  40 mg Subcutaneous Daily     Continuous Infusions:   sodium chloride       PRN Meds:HYDROcodone 5 mg - acetaminophen, sodium chloride flush, sodium chloride, ondansetron **OR** ondansetron, polyethylene glycol, acetaminophen **OR** acetaminophen, potassium chloride **OR** potassium alternative oral replacement **OR** potassium chloride, LORazepam **OR** LORazepam **OR** LORazepam **OR** LORazepam **OR** LORazepam **OR** LORazepam **OR** LORazepam **OR** LORazepam    Objective: Patient is lying in bed with head of the bed elevated 30 degrees, comfortably with pain moderately controlled. Patient is ambulated with and without assistance with tolerable low and mid back discomfort noted. He is otherwise stable and grossly intact to his baseline on exam with no additional changes noted the patient chart overnight. Vitals: /89   Pulse 81   Temp 98.1 °F (36.7 °C) (Oral)   Resp 18   Ht 5' 10\" (1.778 m)   Wt 213 lb 13.5 oz (97 kg)   SpO2 96%   BMI 30.68 kg/m²   Physical Exam:  Alert and attentive. Language appropriate, with no aphasia. Pupils equal.  Facial strength symmetric. Physical Exam  Patient remained stable and intact his baseline with no additional changes noted to the patient chart overnight. He has been ambulating with and without assistance. ROS:  Review of Systems  Complaints of lower back pain secondary to spinous process fracture and mid thoracic pain consistent with compression deformity are noted. Patient denies chest pain, shortness of breath, nausea or vomiting, also denies headache. 24 hour intake/output:    Intake/Output Summary (Last 24 hours) at 6/16/2021 0727  Last data filed at 6/16/2021 0637  Gross per 24 hour   Intake 960 ml   Output 2500 ml   Net -1540 ml     Last 3 weights:   Wt Readings from Last 3 Encounters:   06/16/21 213 lb 13.5 oz (97 kg)   12/29/20 224 lb (101.6 kg)   03/10/19 222 lb 3.2 oz (100.8 kg)         CBC:   Recent Labs     06/13/21  1730 06/14/21  0708   WBC 9.9 5.2   HGB 14.3 13.3*    269     BMP:    Recent Labs     06/13/21  1730 06/14/21  0708  139   K 3.9 4.0   CL 97* 103   CO2 22* 27   BUN 13 11   CREATININE 0.9 0.6   GLUCOSE 117* 100     Calcium:  Recent Labs     06/14/21  0708   CALCIUM 8.5     Magnesium:No results for input(s): MG in the last 72 hours. Glucose:No results for input(s): POCGLU in the last 72 hours. HgbA1C: No results for input(s): LABA1C in the last 72 hours. Lipids: No results for input(s): CHOL, TRIG, HDL, LDLCALC in the last 72 hours. Invalid input(s): LDL    Radiology reports as per the Radiologist  Radiology: MRI THORACIC SPINE WO CONTRAST    Result Date: 6/14/2021  MR thoracic spine without gadolinium Comparison:  CT  - CTA CHEST  - 04/13/2015 02:30 PM EDT Findings: Acute superior endplate compression deformity at T5 with endplate bone marrow edema. Approximately 24% height loss centrally. No retropulsion. Vertebral alignment remains intact. Unremarkable thoracic cord. Minimal posterior disc osteophyte complex at T4-5 and T5-6 without significant neuroforaminal or central canal stenosis. Paraspinous musculature intact. Acute superior endplate compression deformity at T5 with approximate 24% height loss centrally. No retropulsion. This document has been electronically signed by: Laura Michelle MD on 06/14/2021 06:22 PM    MRI LUMBAR SPINE WO CONTRAST    Result Date: 6/14/2021  MR lumbar spine without gadolinium Comparison: None Findings: No acute fracture or pathologic bone lesion. Lumbar lordosis and body heights are intact. Conus medullaris terminates at L1. Cauda equina nerve roots are unremarkable. Mild degenerative grade 1 retrolisthesis of L5 on S1. Mild L5-S1 disc space narrowing. Mild broad-based disc osteophyte complex and bilateral facet arthropathy at L5-S1 causing mild bilateral neuroforaminal stenosis. No central canal stenosis. Paraspinous musculature intact. No acute compression deformity.  This document has been electronically signed by: Laura Michelle MD on 06/14/2021 06:28 PM    A/P: S/P patient is seen and evaluated on the floor with evaluation exam findings reviewed and discussed with Dr. Torie Blanco and with nursing. Patient is resting comfortably with continued complaints of lower and mid back pain secondary to known fractures. Having ambulated with and without assistance and tolerating back discomfort neurosurgery is recommending a discharge when additional criteria are met with a follow-up with neurosurgery to be scheduled in 2 weeks.     Electronically signed by Herlinda Hihgtower PA-C on 6/16/2021 at 7:27 AM

## 2021-06-16 NOTE — PLAN OF CARE
Discharge Planning:  Goal: Discharged to appropriate level of care  Description: Discharged to appropriate level of care  6/16/2021 0126 by Suze Oneal RN  Outcome: Ongoing  Note: Pt plans to be discharged home when appropriate. Care plan reviewed with patient. Patient verbalize understanding of the plan of care and contribute to goal setting.

## 2021-06-16 NOTE — CARE COORDINATION
6/16/21, 9:06 AM EDT    DISCHARGE PLANNING EVALUATION      Spoke with patient regarding court date for tomorrow in [facility]. SW will follow up and notify court that patient is hospitalized.

## 2021-06-16 NOTE — PROGRESS NOTES
Brief Intervention and Referral to Treatment Summary     Patient was provided PHQ-9, AUDIT and DAST Screening:       PHQ-9 Score:  22  AUDIT Score:   36  DAST Score:   1     Patients substance use is considered      Low Risk/Healthy  Moderate Risk  Harmful  Dependent  X     Patients depression is considered:      Minimal  Mild   Moderate  X  Moderately Severe  Severe     Brief Education Was Provided     Patient was receptive        Brief Intervention Is Provided (Only for AUDIT or DAST)      Patient reports readiness to decrease and/or stop use and a plan was discussed         Recommendations/Referrals for Brief and/or Specialized Treatment Provided to Patient     Trauma consult completed by [facility] AN AFFILIATE OF [facility] clinician. Pt alert, oriented, agreeable to AOD Consult. State he has been drinking a liter of vodka daily for years, withdrawal symptoms without use, occasional marijuana use (last time was 1.5 months ago), pt report moderate depression, denies SI/HI, denies hallucinations, no delusions noted. Pt interested in outpatient AOD Treatment/MAT treatment only. Resources given for OP Treatment in [facility], additional resources given including information on Dr. Becky Mccarthy. Clinician advised pt to contact his insurance company to assure provider is in-network.

## 2021-06-17 VITALS
HEIGHT: 70 IN | WEIGHT: 213.85 LBS | SYSTOLIC BLOOD PRESSURE: 115 MMHG | HEART RATE: 80 BPM | BODY MASS INDEX: 30.61 KG/M2 | DIASTOLIC BLOOD PRESSURE: 87 MMHG | TEMPERATURE: 97.8 F | OXYGEN SATURATION: 96 % | RESPIRATION RATE: 18 BRPM

## 2021-06-17 PROCEDURE — 6370000000 HC RX 637 (ALT 250 FOR IP): Performed by: INTERNAL MEDICINE

## 2021-06-17 PROCEDURE — 2580000003 HC RX 258: Performed by: INTERNAL MEDICINE

## 2021-06-17 PROCEDURE — 99239 HOSP IP/OBS DSCHRG MGMT >30: CPT | Performed by: INTERNAL MEDICINE

## 2021-06-17 PROCEDURE — 6360000002 HC RX W HCPCS: Performed by: INTERNAL MEDICINE

## 2021-06-17 RX ORDER — HYDROCODONE BITARTRATE AND ACETAMINOPHEN 5; 325 MG/1; MG/1
1 TABLET ORAL EVERY 6 HOURS PRN
Qty: 12 TABLET | Refills: 0 | Status: SHIPPED | OUTPATIENT
Start: 2021-06-17 | End: 2021-06-20

## 2021-06-17 RX ORDER — IBUPROFEN 800 MG/1
800 TABLET ORAL EVERY 8 HOURS PRN
Qty: 30 TABLET | Refills: 0 | Status: ON HOLD | OUTPATIENT
Start: 2021-06-17 | End: 2022-03-12 | Stop reason: HOSPADM

## 2021-06-17 RX ORDER — LIDOCAINE 4 G/G
3 PATCH TOPICAL DAILY
Qty: 1 BOX | Refills: 1 | Status: ON HOLD | OUTPATIENT
Start: 2021-06-18 | End: 2022-02-09

## 2021-06-17 RX ADMIN — Medication 1 TABLET: at 08:43

## 2021-06-17 RX ADMIN — FOLIC ACID 1 MG: 1 TABLET ORAL at 08:42

## 2021-06-17 RX ADMIN — ESCITALOPRAM 30 MG: 20 TABLET, FILM COATED ORAL at 08:42

## 2021-06-17 RX ADMIN — ENOXAPARIN SODIUM 40 MG: 40 INJECTION SUBCUTANEOUS at 08:42

## 2021-06-17 RX ADMIN — HYDROCODONE BITARTRATE AND ACETAMINOPHEN 2 TABLET: 5; 325 TABLET ORAL at 03:33

## 2021-06-17 RX ADMIN — HYDROCODONE BITARTRATE AND ACETAMINOPHEN 2 TABLET: 5; 325 TABLET ORAL at 08:42

## 2021-06-17 RX ADMIN — LISINOPRIL 10 MG: 10 TABLET ORAL at 08:42

## 2021-06-17 RX ADMIN — METOPROLOL TARTRATE 25 MG: 25 TABLET, FILM COATED ORAL at 08:42

## 2021-06-17 RX ADMIN — Medication 100 MG: at 09:44

## 2021-06-17 RX ADMIN — SODIUM CHLORIDE, PRESERVATIVE FREE 10 ML: 5 INJECTION INTRAVENOUS at 08:45

## 2021-06-17 RX ADMIN — PANTOPRAZOLE SODIUM 40 MG: 40 TABLET, DELAYED RELEASE ORAL at 03:34

## 2021-06-17 ASSESSMENT — PAIN DESCRIPTION - PAIN TYPE
TYPE: ACUTE PAIN
TYPE: ACUTE PAIN

## 2021-06-17 ASSESSMENT — PAIN DESCRIPTION - PROGRESSION
CLINICAL_PROGRESSION: NOT CHANGED
CLINICAL_PROGRESSION: NOT CHANGED

## 2021-06-17 ASSESSMENT — PAIN DESCRIPTION - ONSET
ONSET: ON-GOING
ONSET: ON-GOING

## 2021-06-17 ASSESSMENT — PAIN SCALES - GENERAL
PAINLEVEL_OUTOF10: 8
PAINLEVEL_OUTOF10: 4
PAINLEVEL_OUTOF10: 0
PAINLEVEL_OUTOF10: 8

## 2021-06-17 ASSESSMENT — PAIN DESCRIPTION - ORIENTATION
ORIENTATION: LOWER
ORIENTATION: MID

## 2021-06-17 ASSESSMENT — PAIN DESCRIPTION - FREQUENCY
FREQUENCY: CONTINUOUS
FREQUENCY: CONTINUOUS

## 2021-06-17 ASSESSMENT — PAIN DESCRIPTION - DESCRIPTORS
DESCRIPTORS: SHARP
DESCRIPTORS: SHARP

## 2021-06-17 ASSESSMENT — PAIN DESCRIPTION - DIRECTION: RADIATING_TOWARDS: NECK

## 2021-06-17 ASSESSMENT — PAIN DESCRIPTION - LOCATION
LOCATION: BACK
LOCATION: BACK

## 2021-06-17 NOTE — PLAN OF CARE
Problem: Pain:  Goal: Pain level will decrease  Description: Pain level will decrease  Outcome: Ongoing  Note: Pt complains of pain at a 8/10. Non pharmacological interventions completed which include rest, and distraction. Pt states pain goal at a 0/10. Pain assessed every 4 hours, and as needed. PRN pain medications given as ordered. Problem: Pain:  Goal: Control of acute pain  Description: Control of acute pain  Outcome: Ongoing  Note: Pt complains of pain at a 8/10. Non pharmacological interventions completed which include rest, and distraction. Pt states pain goal at a 0/10. Pain assessed every 4 hours, and as needed. PRN pain medications given as ordered. Problem: Pain:  Goal: Control of chronic pain  Description: Control of chronic pain  Outcome: Ongoing  Note: Pt complains of pain at a 8/10. Non pharmacological interventions completed which include rest, and distraction. Pt states pain goal at a 0/10. Pain assessed every 4 hours, and as needed. PRN pain medications given as ordered. Problem: Falls - Risk of:  Goal: Will remain free from falls  Description: Will remain free from falls  Outcome: Ongoing  Note: Absence of falls this shift. Fall prevention in place. Fall band applied. Bed alarm activated as needed. Problem: Falls - Risk of:  Goal: Absence of physical injury  Description: Absence of physical injury  Outcome: Ongoing  Note: Absence of physical injury. Problem: Nutrition  Goal: Optimal nutrition therapy  Outcome: Ongoing  Note: Pt tolerating meals. Problem: Skin Integrity:  Goal: Absence of new skin breakdown  Description: Absence of new skin breakdown  Outcome: Ongoing  Note: Absence of new skin breakdown. Problem: Discharge Planning:  Goal: Discharged to appropriate level of care  Description: Discharged to appropriate level of care  Outcome: Ongoing  Note: Pt plans to be discharged home when appropriate. Care plan reviewed with patient .   Patient verbalize understanding of the plan of care and contribute to goal setting.

## 2021-06-17 NOTE — PROGRESS NOTES
Patient educated on discharge instructions, medications and follow up appointments. No concerns voiced at this time. To be discharged with family with all belongings.
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
## 2021-06-18 NOTE — PROGRESS NOTES
CLINICAL PHARMACY NOTE: MEDS TO BEDS    Total # of Prescriptions Filled: 3   The following medications were delivered to the patient:  Lidocaine pain relief patches  Ibuprofen 800 mg  Hydocodone/Apap 5-325 mg    Additional Documentation:

## 2021-07-15 ENCOUNTER — OFFICE VISIT (OUTPATIENT)
Dept: NEUROSURGERY | Age: 46
End: 2021-07-15
Payer: MEDICAID

## 2021-07-15 VITALS
HEIGHT: 70 IN | HEART RATE: 88 BPM | SYSTOLIC BLOOD PRESSURE: 100 MMHG | DIASTOLIC BLOOD PRESSURE: 71 MMHG | WEIGHT: 213.85 LBS | BODY MASS INDEX: 30.61 KG/M2 | TEMPERATURE: 97.8 F

## 2021-07-15 DIAGNOSIS — S32.059G CLOSED FRACTURE OF FIFTH LUMBAR VERTEBRA WITH DELAYED HEALING, UNSPECIFIED FRACTURE MORPHOLOGY, SUBSEQUENT ENCOUNTER: Primary | ICD-10-CM

## 2021-07-15 PROCEDURE — 1036F TOBACCO NON-USER: CPT | Performed by: PHYSICIAN ASSISTANT

## 2021-07-15 PROCEDURE — 99204 OFFICE O/P NEW MOD 45 MIN: CPT | Performed by: PHYSICIAN ASSISTANT

## 2021-07-15 PROCEDURE — 1111F DSCHRG MED/CURRENT MED MERGE: CPT | Performed by: PHYSICIAN ASSISTANT

## 2021-07-15 PROCEDURE — G8417 CALC BMI ABV UP PARAM F/U: HCPCS | Performed by: PHYSICIAN ASSISTANT

## 2021-07-15 PROCEDURE — G8427 DOCREV CUR MEDS BY ELIG CLIN: HCPCS | Performed by: PHYSICIAN ASSISTANT

## 2021-07-15 RX ORDER — VENLAFAXINE 75 MG/1
75 TABLET ORAL DAILY
Status: ON HOLD | COMMUNITY
End: 2022-02-09
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
## 2022-02-09 ENCOUNTER — HOSPITAL ENCOUNTER (INPATIENT)
Age: 47
LOS: 2 days | Discharge: HOME | End: 2022-02-11
Attending: EMERGENCY MEDICINE | Admitting: HOSPITALIST
Payer: MEDICAID

## 2022-02-09 DIAGNOSIS — F10.939 ALCOHOL WITHDRAWAL SYNDROME WITH COMPLICATION (HCC): Primary | ICD-10-CM

## 2022-02-09 LAB
ALBUMIN SERPL-MCNC: 4.9 G/DL (ref 3.5–5.1)
ALP BLD-CCNC: 106 U/L (ref 38–126)
ALT SERPL-CCNC: 77 U/L (ref 11–66)
ANION GAP SERPL CALCULATED.3IONS-SCNC: 19 MEQ/L (ref 8–16)
AST SERPL-CCNC: 88 U/L (ref 5–40)
BASOPHILS # BLD: 0.7 %
BASOPHILS ABSOLUTE: 0.1 THOU/MM3 (ref 0–0.1)
BILIRUB SERPL-MCNC: 0.4 MG/DL (ref 0.3–1.2)
BUN BLDV-MCNC: 12 MG/DL (ref 7–22)
CALCIUM SERPL-MCNC: 10.2 MG/DL (ref 8.5–10.5)
CHLORIDE BLD-SCNC: 97 MEQ/L (ref 98–111)
CO2: 23 MEQ/L (ref 23–33)
CREAT SERPL-MCNC: 1.1 MG/DL (ref 0.4–1.2)
EKG ATRIAL RATE: 102 BPM
EKG P AXIS: 40 DEGREES
EKG P-R INTERVAL: 134 MS
EKG Q-T INTERVAL: 336 MS
EKG QRS DURATION: 80 MS
EKG QTC CALCULATION (BAZETT): 437 MS
EKG R AXIS: 69 DEGREES
EKG T AXIS: 28 DEGREES
EKG VENTRICULAR RATE: 102 BPM
EOSINOPHIL # BLD: 1.9 %
EOSINOPHILS ABSOLUTE: 0.1 THOU/MM3 (ref 0–0.4)
ERYTHROCYTE [DISTWIDTH] IN BLOOD BY AUTOMATED COUNT: 13 % (ref 11.5–14.5)
ERYTHROCYTE [DISTWIDTH] IN BLOOD BY AUTOMATED COUNT: 49.8 FL (ref 35–45)
ETHYL ALCOHOL, SERUM: 0.18 %
GFR SERPL CREATININE-BSD FRML MDRD: 72 ML/MIN/1.73M2
GLUCOSE BLD-MCNC: 131 MG/DL (ref 70–108)
HCT VFR BLD CALC: 49.1 % (ref 42–52)
HEMOGLOBIN: 17.3 GM/DL (ref 14–18)
IMMATURE GRANS (ABS): 0.02 THOU/MM3 (ref 0–0.07)
IMMATURE GRANULOCYTES: 0.3 %
INR BLD: 0.98 (ref 0.85–1.13)
LIPASE: 96.5 U/L (ref 5.6–51.3)
LYMPHOCYTES # BLD: 32.1 %
LYMPHOCYTES ABSOLUTE: 2.4 THOU/MM3 (ref 1–4.8)
MCH RBC QN AUTO: 36.3 PG (ref 26–33)
MCHC RBC AUTO-ENTMCNC: 35.2 GM/DL (ref 32.2–35.5)
MCV RBC AUTO: 102.9 FL (ref 80–94)
MONOCYTES # BLD: 9.3 %
MONOCYTES ABSOLUTE: 0.7 THOU/MM3 (ref 0.4–1.3)
NUCLEATED RED BLOOD CELLS: 0 /100 WBC
OSMOLALITY CALCULATION: 279.1 MOSMOL/KG (ref 275–300)
PLATELET # BLD: 357 THOU/MM3 (ref 130–400)
PMV BLD AUTO: 10.3 FL (ref 9.4–12.4)
POTASSIUM REFLEX MAGNESIUM: 3.9 MEQ/L (ref 3.5–5.2)
RBC # BLD: 4.77 MILL/MM3 (ref 4.7–6.1)
SEG NEUTROPHILS: 55.7 %
SEGMENTED NEUTROPHILS ABSOLUTE COUNT: 4.2 THOU/MM3 (ref 1.8–7.7)
SODIUM BLD-SCNC: 139 MEQ/L (ref 135–145)
TOTAL PROTEIN: 8.4 G/DL (ref 6.1–8)
WBC # BLD: 7.5 THOU/MM3 (ref 4.8–10.8)

## 2022-02-09 PROCEDURE — 85025 COMPLETE CBC W/AUTO DIFF WBC: CPT

## 2022-02-09 PROCEDURE — 1200000003 HC TELEMETRY R&B

## 2022-02-09 PROCEDURE — 96374 THER/PROPH/DIAG INJ IV PUSH: CPT

## 2022-02-09 PROCEDURE — 6370000000 HC RX 637 (ALT 250 FOR IP): Performed by: PHYSICIAN ASSISTANT

## 2022-02-09 PROCEDURE — 36415 COLL VENOUS BLD VENIPUNCTURE: CPT

## 2022-02-09 PROCEDURE — 2580000003 HC RX 258: Performed by: EMERGENCY MEDICINE

## 2022-02-09 PROCEDURE — 99284 EMERGENCY DEPT VISIT MOD MDM: CPT

## 2022-02-09 PROCEDURE — 93005 ELECTROCARDIOGRAM TRACING: CPT | Performed by: EMERGENCY MEDICINE

## 2022-02-09 PROCEDURE — 99223 1ST HOSP IP/OBS HIGH 75: CPT | Performed by: PHYSICIAN ASSISTANT

## 2022-02-09 PROCEDURE — 80053 COMPREHEN METABOLIC PANEL: CPT

## 2022-02-09 PROCEDURE — 1200000000 HC SEMI PRIVATE

## 2022-02-09 PROCEDURE — 96375 TX/PRO/DX INJ NEW DRUG ADDON: CPT

## 2022-02-09 PROCEDURE — 93010 ELECTROCARDIOGRAM REPORT: CPT | Performed by: NUCLEAR MEDICINE

## 2022-02-09 PROCEDURE — 96376 TX/PRO/DX INJ SAME DRUG ADON: CPT

## 2022-02-09 PROCEDURE — 83690 ASSAY OF LIPASE: CPT

## 2022-02-09 PROCEDURE — 6360000002 HC RX W HCPCS: Performed by: EMERGENCY MEDICINE

## 2022-02-09 PROCEDURE — 6360000002 HC RX W HCPCS: Performed by: PHYSICIAN ASSISTANT

## 2022-02-09 PROCEDURE — 96361 HYDRATE IV INFUSION ADD-ON: CPT

## 2022-02-09 PROCEDURE — 2580000003 HC RX 258: Performed by: PHYSICIAN ASSISTANT

## 2022-02-09 PROCEDURE — 82077 ASSAY SPEC XCP UR&BREATH IA: CPT

## 2022-02-09 PROCEDURE — 85610 PROTHROMBIN TIME: CPT

## 2022-02-09 RX ORDER — SODIUM CHLORIDE 9 MG/ML
INJECTION, SOLUTION INTRAVENOUS CONTINUOUS
Status: DISCONTINUED | OUTPATIENT
Start: 2022-02-09 | End: 2022-02-11 | Stop reason: HOSPADM

## 2022-02-09 RX ORDER — SODIUM CHLORIDE 9 MG/ML
1000 INJECTION, SOLUTION INTRAVENOUS CONTINUOUS
Status: DISCONTINUED | OUTPATIENT
Start: 2022-02-09 | End: 2022-02-09 | Stop reason: ALTCHOICE

## 2022-02-09 RX ORDER — ACETAMINOPHEN 650 MG/1
650 SUPPOSITORY RECTAL EVERY 6 HOURS PRN
Status: DISCONTINUED | OUTPATIENT
Start: 2022-02-09 | End: 2022-02-11 | Stop reason: HOSPADM

## 2022-02-09 RX ORDER — ONDANSETRON 2 MG/ML
4 INJECTION INTRAMUSCULAR; INTRAVENOUS EVERY 6 HOURS PRN
Status: DISCONTINUED | OUTPATIENT
Start: 2022-02-09 | End: 2022-02-11 | Stop reason: HOSPADM

## 2022-02-09 RX ORDER — SODIUM CHLORIDE 0.9 % (FLUSH) 0.9 %
10 SYRINGE (ML) INJECTION EVERY 12 HOURS SCHEDULED
Status: DISCONTINUED | OUTPATIENT
Start: 2022-02-09 | End: 2022-02-11 | Stop reason: HOSPADM

## 2022-02-09 RX ORDER — FOLIC ACID 1 MG/1
1 TABLET ORAL DAILY
Status: DISCONTINUED | OUTPATIENT
Start: 2022-02-09 | End: 2022-02-11 | Stop reason: HOSPADM

## 2022-02-09 RX ORDER — POTASSIUM CHLORIDE 20 MEQ/1
40 TABLET, EXTENDED RELEASE ORAL PRN
Status: DISCONTINUED | OUTPATIENT
Start: 2022-02-09 | End: 2022-02-11 | Stop reason: HOSPADM

## 2022-02-09 RX ORDER — SODIUM CHLORIDE 0.9 % (FLUSH) 0.9 %
10 SYRINGE (ML) INJECTION PRN
Status: DISCONTINUED | OUTPATIENT
Start: 2022-02-09 | End: 2022-02-11 | Stop reason: HOSPADM

## 2022-02-09 RX ORDER — ONDANSETRON 2 MG/ML
4 INJECTION INTRAMUSCULAR; INTRAVENOUS ONCE
Status: COMPLETED | OUTPATIENT
Start: 2022-02-09 | End: 2022-02-09

## 2022-02-09 RX ORDER — ONDANSETRON 4 MG/1
4 TABLET, ORALLY DISINTEGRATING ORAL EVERY 8 HOURS PRN
Status: DISCONTINUED | OUTPATIENT
Start: 2022-02-09 | End: 2022-02-11 | Stop reason: HOSPADM

## 2022-02-09 RX ORDER — LISINOPRIL 10 MG/1
10 TABLET ORAL DAILY
Status: DISCONTINUED | OUTPATIENT
Start: 2022-02-09 | End: 2022-02-11 | Stop reason: HOSPADM

## 2022-02-09 RX ORDER — 0.9 % SODIUM CHLORIDE 0.9 %
1000 INTRAVENOUS SOLUTION INTRAVENOUS ONCE
Status: COMPLETED | OUTPATIENT
Start: 2022-02-09 | End: 2022-02-09

## 2022-02-09 RX ORDER — MAGNESIUM SULFATE IN WATER 40 MG/ML
2000 INJECTION, SOLUTION INTRAVENOUS PRN
Status: DISCONTINUED | OUTPATIENT
Start: 2022-02-09 | End: 2022-02-11 | Stop reason: HOSPADM

## 2022-02-09 RX ORDER — POLYETHYLENE GLYCOL 3350 17 G/17G
17 POWDER, FOR SOLUTION ORAL DAILY PRN
Status: DISCONTINUED | OUTPATIENT
Start: 2022-02-09 | End: 2022-02-11 | Stop reason: HOSPADM

## 2022-02-09 RX ORDER — POTASSIUM CHLORIDE 7.45 MG/ML
10 INJECTION INTRAVENOUS PRN
Status: DISCONTINUED | OUTPATIENT
Start: 2022-02-09 | End: 2022-02-11 | Stop reason: HOSPADM

## 2022-02-09 RX ORDER — ACETAMINOPHEN 500 MG
1000 TABLET ORAL EVERY 6 HOURS PRN
COMMUNITY

## 2022-02-09 RX ORDER — ONDANSETRON 2 MG/ML
INJECTION INTRAMUSCULAR; INTRAVENOUS
Status: DISPENSED
Start: 2022-02-09 | End: 2022-02-10

## 2022-02-09 RX ORDER — LORAZEPAM 2 MG/ML
2 INJECTION INTRAMUSCULAR ONCE
Status: COMPLETED | OUTPATIENT
Start: 2022-02-09 | End: 2022-02-09

## 2022-02-09 RX ORDER — MULTIVITAMIN WITH IRON
1 TABLET ORAL DAILY
Status: DISCONTINUED | OUTPATIENT
Start: 2022-02-09 | End: 2022-02-11 | Stop reason: HOSPADM

## 2022-02-09 RX ORDER — SODIUM CHLORIDE 9 MG/ML
25 INJECTION, SOLUTION INTRAVENOUS PRN
Status: DISCONTINUED | OUTPATIENT
Start: 2022-02-09 | End: 2022-02-11 | Stop reason: HOSPADM

## 2022-02-09 RX ORDER — ACETAMINOPHEN 325 MG/1
650 TABLET ORAL EVERY 6 HOURS PRN
Status: DISCONTINUED | OUTPATIENT
Start: 2022-02-09 | End: 2022-02-11 | Stop reason: HOSPADM

## 2022-02-09 RX ORDER — LANOLIN ALCOHOL/MO/W.PET/CERES
100 CREAM (GRAM) TOPICAL DAILY
Status: DISCONTINUED | OUTPATIENT
Start: 2022-02-09 | End: 2022-02-11 | Stop reason: HOSPADM

## 2022-02-09 RX ORDER — LIDOCAINE 4 G/G
3 PATCH TOPICAL DAILY
Status: DISCONTINUED | OUTPATIENT
Start: 2022-02-09 | End: 2022-02-11 | Stop reason: HOSPADM

## 2022-02-09 RX ORDER — PANTOPRAZOLE SODIUM 40 MG/1
40 TABLET, DELAYED RELEASE ORAL
Status: DISCONTINUED | OUTPATIENT
Start: 2022-02-10 | End: 2022-02-11 | Stop reason: HOSPADM

## 2022-02-09 RX ORDER — VENLAFAXINE 37.5 MG/1
75 TABLET ORAL DAILY
Status: DISCONTINUED | OUTPATIENT
Start: 2022-02-09 | End: 2022-02-11 | Stop reason: HOSPADM

## 2022-02-09 RX ORDER — PHENOBARBITAL 32.4 MG/1
64.8 TABLET ORAL 2 TIMES DAILY
Status: COMPLETED | OUTPATIENT
Start: 2022-02-10 | End: 2022-02-10

## 2022-02-09 RX ORDER — PHENOBARBITAL 32.4 MG/1
32.4 TABLET ORAL 2 TIMES DAILY
Status: DISCONTINUED | OUTPATIENT
Start: 2022-02-11 | End: 2022-02-11 | Stop reason: HOSPADM

## 2022-02-09 RX ADMIN — LISINOPRIL 10 MG: 10 TABLET ORAL at 23:46

## 2022-02-09 RX ADMIN — SODIUM CHLORIDE: 9 INJECTION, SOLUTION INTRAVENOUS at 22:00

## 2022-02-09 RX ADMIN — SODIUM CHLORIDE 1000 ML: 9 INJECTION, SOLUTION INTRAVENOUS at 13:53

## 2022-02-09 RX ADMIN — FOLIC ACID 1 MG: 1 TABLET ORAL at 23:46

## 2022-02-09 RX ADMIN — SODIUM CHLORIDE 1000 ML: 9 INJECTION, SOLUTION INTRAVENOUS at 12:14

## 2022-02-09 RX ADMIN — PHENOBARBITAL SODIUM 291.85 MG: 65 INJECTION INTRAMUSCULAR at 20:30

## 2022-02-09 RX ADMIN — Medication 100 MG: at 23:46

## 2022-02-09 RX ADMIN — METOPROLOL TARTRATE 25 MG: 25 TABLET, FILM COATED ORAL at 23:46

## 2022-02-09 RX ADMIN — ESCITALOPRAM OXALATE 30 MG: 20 TABLET ORAL at 23:45

## 2022-02-09 RX ADMIN — ONDANSETRON HYDROCHLORIDE 4 MG: 4 INJECTION, SOLUTION INTRAMUSCULAR; INTRAVENOUS at 18:05

## 2022-02-09 RX ADMIN — ONDANSETRON 4 MG: 2 INJECTION INTRAMUSCULAR; INTRAVENOUS at 14:45

## 2022-02-09 RX ADMIN — LORAZEPAM 2 MG: 2 INJECTION INTRAMUSCULAR; INTRAVENOUS at 17:05

## 2022-02-09 RX ADMIN — PHENOBARBITAL SODIUM 291.85 MG: 65 INJECTION INTRAMUSCULAR at 23:55

## 2022-02-09 RX ADMIN — Medication 1 TABLET: at 23:46

## 2022-02-09 RX ADMIN — ENOXAPARIN SODIUM 40 MG: 100 INJECTION SUBCUTANEOUS at 23:46

## 2022-02-09 RX ADMIN — LORAZEPAM 2 MG: 2 INJECTION INTRAMUSCULAR; INTRAVENOUS at 12:14

## 2022-02-09 ASSESSMENT — PAIN SCALES - GENERAL
PAINLEVEL_OUTOF10: 3
PAINLEVEL_OUTOF10: 3
PAINLEVEL_OUTOF10: 6

## 2022-02-09 ASSESSMENT — PAIN DESCRIPTION - ORIENTATION
ORIENTATION: RIGHT
ORIENTATION: RIGHT

## 2022-02-09 ASSESSMENT — PAIN DESCRIPTION - LOCATION
LOCATION: ABDOMEN
LOCATION: ABDOMEN
LOCATION: BACK

## 2022-02-09 ASSESSMENT — PAIN DESCRIPTION - PAIN TYPE
TYPE: ACUTE PAIN
TYPE: ACUTE PAIN

## 2022-02-09 NOTE — LETTER
[facility] MED SURG 8A  [address] [facility] [address]  Phone: [phone]             February 11, 2022    Patient: Megan Farooq   YOB: 1975   Date of Visit: 2/9/2022       To Whom It May Concern:    Stephanie Gunn was seen and treated in our facility  beginning 2/9/2022 until 2/11/2022. He may return to work on 2/14/2022.       Sincerely,       Micaela Onofre PA-C         Signature:__________________________________

## 2022-02-09 NOTE — ED NOTES
Called 8A to notify that pt would be heading up soon, talked to Mike Mccarthy. Pt transported by cart, andreea.       Shailesh Medina  02/09/22 2869

## 2022-02-09 NOTE — H&P
Hospitalist History & Physical    Patient:  Benton Solders Deamicis    Unit/Bed:PADMINI /PADMINI  YOB: 1975  MRN: [id number]   Acct: [de-identified]   PCP: Khushi Jha  Code Status: Prior    Date of Service: Pt seen/examined on 02/09/22 and admitted to Inpatient with expected LOS greater than two midnights due to medical therapy. Chief Complaint: abdominal pain, etoh withdrawal    Assessment/Plan:    1. Chronic alcohol abuse, with acute withdrawal: Start phenobarb protocol. MTV, folic acid, thiamine. IVF. Seizure precautions. Addiction services consult. 2. Hepatic steatosis: CT AP 1/26/2022 at  reported \"unchanged diffuse hepatic fatty infiltration. There is now a multilobulated structure in the interior right hepatic lobe. Patient states that he continues to have right sided abdominal pain. LFTs slightly elevated. Will get liver ultrasound to further evaluate. 3. Essential HTN: Resume home metoprolol, lisinopril. 4. Sinus tachycardia: likely d/t #1. IVF, resume home metoprolol. 5. Depression: cont home meds. History of Present Illness: This is a 55year old male with PMHx alcohol dependence, HTN, liver disease, MDD who presented to [facility] for etoh withdrawal. Patient states that he came in because of \"his liver. \" He states that for approx 3 weeks he has had RUQ abdominal pain and that he can \"feel his liver getting bigger. \" Patient states he was seen at [facility] ED about a week ago and was told on imaging that he had a liver mass that he needed to have further worked up outpatient. He reports associated N/V. States that on Sunday he had an episode of \"coughing up blood\" after vomiting, none since. Currently his abd pain is 5/10. Patient states that he typically drinks about 2L of liquor daily. He states he cut back to about a 1/2L daily in attempt to get through withdrawal on his own prior to further workup for his liver but that the detox symptoms have been to severe.  He currently reports anxiety, tremors, palpitations, diaphoresis, states he had visual hallucinations this am. Patient admitted to hositalist service for further management. Review of Systems: Pertinent positives as noted in the HPI. All other systems reviewed and negative. Past Medical History:        Diagnosis Date    Alcohol dependence ([address]     Arthritis     Hyperlipidemia     Hypertension     Liver disease     Panic attacks     Pneumonia     Psychiatric problem     major depressive disorder    Scoliosis        Past Surgical History:        Procedure Laterality Date    BACK SURGERY      x2    CARDIAC SURGERY      EKG 12-LEAD  4/14/2015            Home Medications:   No current facility-administered medications on file prior to encounter.      Current Outpatient Medications on File Prior to Encounter   Medication Sig Dispense Refill    venlafaxine (EFFEXOR) 75 MG tablet Take 75 mg by mouth daily      ibuprofen (ADVIL;MOTRIN) 800 MG tablet Take 1 tablet by mouth every 8 hours as needed for Pain 30 tablet 0    lidocaine 4 % external patch Place 3 patches onto the skin daily 1 box 1    escitalopram (LEXAPRO) 20 MG tablet Take 1.5 tablets by mouth daily 45 tablet 3    lisinopril (PRINIVIL;ZESTRIL) 10 MG tablet Take 1 tablet by mouth daily (Patient taking differently: Take 20 mg by mouth daily ) 30 tablet 3    metoprolol tartrate (LOPRESSOR) 25 MG tablet Take 1 tablet by mouth 2 times daily 60 tablet 0    pantoprazole (PROTONIX) 40 MG tablet Take 1 tablet by mouth every morning (before breakfast) 30 tablet 3    thiamine 100 MG tablet Take 1 tablet by mouth daily 30 tablet 5    folic acid (FOLVITE) 1 MG tablet Take 1 tablet by mouth daily 30 tablet 5    disulfiram (ANTABUSE) 250 MG tablet Take 1 tablet by mouth daily 30 tablet 2    Multiple Vitamin (MULTIVITAMIN) TABS tablet Take 1 tablet by mouth daily 90 tablet 3       Allergies:    Fentanyl    Social History:    reports that he has quit smoking. He has a 4.25 pack-year smoking history. He has never used smokeless tobacco. He reports previous alcohol use. He reports that he does not use drugs. Family History:       Problem Relation Age of Onset    Kidney Disease Mother     Arthritis Father     Depression Father     Diabetes Father     Heart Disease Father     High Blood Pressure Father     High Cholesterol Father     Substance Abuse Father         alcohol/ pain medications    Depression Sister     Depression Brother     Diabetes Brother        Diet:  No diet orders on file      Physical Exam:  BP (!) 154/111   Pulse 102   Temp 98.8 °F (37.1 °C)   Resp 14   Wt 213 lb (96.6 kg)   SpO2 95%   BMI 30.56 kg/m²   General:   Pleasant male. NAD. HEENT:  normocephalic and atraumatic. No scleral icterus. PERR. Neck: supple. No JVD. No thyromegaly. Lungs: clear to auscultation. No retractions  Cardiac: tachycardiac without murmur. Abdomen: soft. Nontender. Bowel sounds positive. Extremities:  No clubbing, cyanosis, or edema x 4. Vasculature: capillary refill < 3 seconds. Palpable LE pulses bilaterally. Skin:  warm and dry. No rashes or lesions. Diaphoretic. Psych:  Alert and oriented x3. Appears anxious. Lymph:  No supraclavicular adenopathy. Neurologic:  No focal deficit. No seizures. Data: (All radiographs, tracings, PFTs, and imaging are personally viewed and interpreted unless otherwise noted)  Labs:   Recent Labs     02/09/22  1127   WBC 7.5   HGB 17.3   HCT 49.1        Recent Labs     02/09/22  1127      K 3.9   CL 97*   CO2 23   BUN 12   CREATININE 1.1   CALCIUM 10.2     Recent Labs     02/09/22  1127   AST 88*   ALT 77*   BILITOT 0.4   ALKPHOS 106     Recent Labs     02/09/22  1148   INR 0.98     No results for input(s): Kimberlee Councilman in the last 72 hours.   Urinalysis:   Lab Results   Component Value Date    NITRU NEGATIVE 03/05/2019    WBCUA 0-2 03/05/2019    BACTERIA NONE 03/05/2019 RBCUA 0-2 03/05/2019    BLOODU NEGATIVE 03/05/2019    SPECGRAV 1.028 05/18/2014    GLUCOSEU NEGATIVE 03/05/2019       EKG: sinus tachycardia    Radiology:  No orders to display     No results found. Tele:   [x] yes             [] no      Thank you Elisha Small for the opportunity to be involved in this patient's care.     Electronically signed by Shanon Benz PA-C on 2/9/2022 at 5:42 PM

## 2022-02-09 NOTE — PROGRESS NOTES
Perfect serve message sent to Keven Bolivar, [facility] regarding patients anxiety, orders to call pharmacy and send phenobarb iv piggy back early, called pharmacy.

## 2022-02-09 NOTE — ED NOTES
Pt stated he normally drinks 1 liter of liquor but has cut himself down to half but it is not working.       Sharon Grace RN  02/09/22 1184

## 2022-02-09 NOTE — ED PROVIDER NOTES
[address] [facility]       Chief Complaint   Patient presents with    Alcohol Problem       Nurses Notes reviewed and I agree except as noted in the HPI. HISTORY OF PRESENT ILLNESS    Ayaka Marmolejo is a 55 y.o. male who presents with complaint of alcohol abuse, last drink was 6 hours ago, states that he drank tequila and rum. Patient states that he is having an anxiety episode, generally sad but without suicidal ideation. Patient denies abusing any other street drugs. Onset: Acute on chronic  Duration: Last drink was 6 hours ago  Timing: Chronic alcoholism  Location of Pain: No pain  Intesity/severity: Moderate anxiety  Modifying Factors: Family pressure/alcohol abuse  Relieved by;  Previous Episodes; Tx Before arrival: None  REVIEW OF SYSTEMS      Review of Systems   Constitutional: Negative for fever, chills, diaphoresis and fatigue. HENT: Negative for congestion, drooling, facial swelling and sore throat. Eyes: Negative for photophobia, pain and discharge. Respiratory: Negative for cough, shortness of breath, wheezing and stridor. Cardiovascular: Negative for chest pain, palpitations and leg swelling. Gastrointestinal: Negative for abdominal pain, blood in stool and abdominal distention. Genitourinary: Negative for dysuria, urgency, hematuria and difficulty urinating. Musculoskeletal: Negative for gait problem, neck pain and neck stiffness. Skin; No rash, No itching  Neurological: Negative for seizures, weakness and numbness. Hematological: Negative for adenopathy. Does not bruise/bleed easily. Psychiatric/Behavioral: Negative for hallucinations, confusion and agitation. PAST MEDICAL HISTORY    has a past medical history of Alcohol dependence ([facility] [address] Arthritis, Hyperlipidemia, Hypertension, Liver disease, Panic attacks, Pneumonia, Psychiatric problem, and Scoliosis.     SURGICAL HISTORY      has a past surgical history that includes back surgery; EKG 12 Lead (4/14/2015); and Cardiac surgery. CURRENT MEDICATIONS       Current Discharge Medication List      CONTINUE these medications which have NOT CHANGED    Details   acetaminophen (TYLENOL) 500 MG tablet Take 1,000 mg by mouth every 6 hours as needed for Pain      ibuprofen (ADVIL;MOTRIN) 800 MG tablet Take 1 tablet by mouth every 8 hours as needed for Pain  Qty: 30 tablet, Refills: 0      escitalopram (LEXAPRO) 20 MG tablet Take 1.5 tablets by mouth daily  Qty: 45 tablet, Refills: 3      lisinopril (PRINIVIL;ZESTRIL) 10 MG tablet Take 1 tablet by mouth daily  Qty: 30 tablet, Refills: 3      metoprolol tartrate (LOPRESSOR) 25 MG tablet Take 1 tablet by mouth 2 times daily  Qty: 60 tablet, Refills: 0      Multiple Vitamin (MULTIVITAMIN) TABS tablet Take 1 tablet by mouth daily  Qty: 90 tablet, Refills: 3             ALLERGIES     is allergic to fentanyl. FAMILY HISTORY     He indicated that his mother is alive. He indicated that his father is alive. He indicated that his sister is alive. He indicated that his brother is alive. family history includes Arthritis in his father; Depression in his brother, father, and sister; Diabetes in his brother and father; Heart Disease in his father; High Blood Pressure in his father; High Cholesterol in his father; Kidney Disease in his mother; Substance Abuse in his father. SOCIAL HISTORY      reports that he has quit smoking. He has a 4.25 pack-year smoking history. He has never used smokeless tobacco. He reports previous alcohol use. He reports that he does not use drugs. PHYSICAL EXAM     INITIAL VITALS:  height is 5' 10\" (1.778 m) and weight is 222 lb 7.1 oz (100.9 kg). His oral temperature is 97 °F (36.1 °C). His blood pressure is 141/96 (abnormal) and his pulse is 88. His respiration is 20 and oxygen saturation is 96%. Physical Exam   Constitutional:  well-developed and well-nourished.    HENT: Head: Normocephalic, atraumatic, Bilateral external ears normal, Oropharynx mosit, No oral exudates, Nose normal.   Eyes: PERRL, EOMI, Conjunctiva normal, No discharge. No scleral icterus  Neck: Normal range of motion, No tenderness, Supple  Cardiovascular: Increased heart rate, regular rhythm, S1 normal and S2 normal.  Exam reveals no gallop. Pulmonary/Chest: Effort normal and breath sounds normal. No accessory muscle usage or stridor. No respiratory distress. no wheezes. has no rales. exhibits no tenderness. Abdominal: Soft. Bowel sounds are normal.  exhibits no distension. There is no tenderness. There is no rebound and no guarding. Extremities: No edema, no tenderness, no cyanosis, no clubbing. Musculoskeletal: Good range of motion in major joints is observed. No major deformities noted. Neurological: Alert and oriented ×3, normal motor function, normal sensory function, no focal deficits. GCS 15  Skin: Skin is warm, dry and intact. No rash noted. No erythema. Psychiatric: Affect normal, judgment normal, mood normal.  DIFFERENTIAL DIAGNOSIS:       DIAGNOSTIC RESULTS     EKG: All EKG's are interpreted by the Emergency Department Physician who either signs or Co-signs this chart in the absence of a cardiologist.      RADIOLOGY: non-plain film images(s) such as CT, Ultrasound and MRI are read by the radiologist.  Plain radiographic images are visualized and preliminarily interpreted by the emergency physician unless otherwise stated below.       LABS:   Labs Reviewed   CBC WITH AUTO DIFFERENTIAL - Abnormal; Notable for the following components:       Result Value    .9 (*)     MCH 36.3 (*)     RDW-SD 49.8 (*)     All other components within normal limits   LIPASE - Abnormal; Notable for the following components:    Lipase 96.5 (*)     All other components within normal limits   COMPREHENSIVE METABOLIC PANEL W/ REFLEX TO MG FOR LOW K - Abnormal; Notable for the following components:    Glucose 131 (*)     Chloride 97 (*)     AST 88 (*)     Total Protein 8.4 (*)     ALT 77 (*)     All other components within normal limits   ANION GAP - Abnormal; Notable for the following components:    Anion Gap 19.0 (*)     All other components within normal limits   GLOMERULAR FILTRATION RATE, ESTIMATED - Abnormal; Notable for the following components:    Est, Glom Filt Rate 72 (*)     All other components within normal limits   CBC - Abnormal; Notable for the following components:    RBC 4.14 (*)     .0 (*)     MCH 35.5 (*)     RDW-SD 52.1 (*)     All other components within normal limits   COMPREHENSIVE METABOLIC PANEL W/ REFLEX TO MG FOR LOW K - Abnormal; Notable for the following components:    Glucose 123 (*)     AST 62 (*)     All other components within normal limits   GLOMERULAR FILTRATION RATE, ESTIMATED - Abnormal; Notable for the following components:    Est, Glom Filt Rate 80 (*)     All other components within normal limits   BASIC METABOLIC PANEL - Abnormal; Notable for the following components:    Glucose 113 (*)     Calcium 8.4 (*)     All other components within normal limits   HEPATIC FUNCTION PANEL - Abnormal; Notable for the following components:    AST 53 (*)     All other components within normal limits   CBC - Abnormal; Notable for the following components:    RBC 3.81 (*)     Hemoglobin 13.7 (*)     Hematocrit 40.6 (*)     .6 (*)     MCH 36.0 (*)     RDW-SD 51.0 (*)     All other components within normal limits   GLOMERULAR FILTRATION RATE, ESTIMATED - Abnormal; Notable for the following components:    Est, Glom Filt Rate 80 (*)     All other components within normal limits   ETHANOL   URINE DRUG SCREEN   PROTIME-INR   OSMOLALITY   ANION GAP   AFP TUMOR MARKER   VITAMIN B12 & FOLATE   ANION GAP       EMERGENCY DEPARTMENT COURSE:   Vitals:    Vitals:    02/10/22 2259 02/11/22 0348 02/11/22 0842 02/11/22 1115   BP:  (!) 155/99 (!) 154/77 (!) 141/96   Pulse:  80 82 88   Resp:  18 20 20   Temp:  97.6 °F (36.4 °C) 98 °F (36.7 °C) 97 °F (36.1 °C) TempSrc:  Oral Oral Oral   SpO2:  99% 98% 96%   Weight: 222 lb 7.1 oz (100.9 kg)      Height:         Patient presenting with complaint of alcohol abuse, last drink 6 hours ago, tachycardia, stable blood pressure, no hallucinations, no delirium. CRITICAL CARE:       CONSULTS:  None    PROCEDURES:  None    FINAL IMPRESSION      1.  Alcohol withdrawal syndrome with complication [facility])          DISPOSITION/PLAN   Admitted    PATIENT REFERRED TO:  Amberly Duggan  [address]  [phone]    On 2/23/2022  Follow-up appointment February 23, 2022 at [facility]:  Current Discharge Medication List          (Please note that portions of this note were completed with a voice recognition program.  Efforts were made to edit the dictations but occasionally words are mis-transcribed.)    DO Arminda Ordonez DO  02/11/22 4709

## 2022-02-10 ENCOUNTER — APPOINTMENT (OUTPATIENT)
Dept: MRI IMAGING | Age: 47
End: 2022-02-10
Payer: MEDICAID

## 2022-02-10 ENCOUNTER — APPOINTMENT (OUTPATIENT)
Dept: ULTRASOUND IMAGING | Age: 47
End: 2022-02-10
Payer: MEDICAID

## 2022-02-10 LAB
AFP-TUMOR MARKER: 3 UG/L
ALBUMIN SERPL-MCNC: 3.9 G/DL (ref 3.5–5.1)
ALP BLD-CCNC: 82 U/L (ref 38–126)
ALT SERPL-CCNC: 60 U/L (ref 11–66)
AMPHETAMINE+METHAMPHETAMINE URINE SCREEN: NEGATIVE
ANION GAP SERPL CALCULATED.3IONS-SCNC: 10 MEQ/L (ref 8–16)
AST SERPL-CCNC: 62 U/L (ref 5–40)
BARBITURATE QUANTITATIVE URINE: POSITIVE
BENZODIAZEPINE QUANTITATIVE URINE: NEGATIVE
BILIRUB SERPL-MCNC: 0.6 MG/DL (ref 0.3–1.2)
BUN BLDV-MCNC: 13 MG/DL (ref 7–22)
CALCIUM SERPL-MCNC: 8.8 MG/DL (ref 8.5–10.5)
CANNABINOID QUANTITATIVE URINE: NEGATIVE
CHLORIDE BLD-SCNC: 103 MEQ/L (ref 98–111)
CO2: 27 MEQ/L (ref 23–33)
COCAINE METABOLITE QUANTITATIVE URINE: NEGATIVE
CREAT SERPL-MCNC: 1 MG/DL (ref 0.4–1.2)
ERYTHROCYTE [DISTWIDTH] IN BLOOD BY AUTOMATED COUNT: 13.2 % (ref 11.5–14.5)
ERYTHROCYTE [DISTWIDTH] IN BLOOD BY AUTOMATED COUNT: 52.1 FL (ref 35–45)
GFR SERPL CREATININE-BSD FRML MDRD: 80 ML/MIN/1.73M2
GLUCOSE BLD-MCNC: 123 MG/DL (ref 70–108)
HCT VFR BLD CALC: 43.9 % (ref 42–52)
HEMOGLOBIN: 14.7 GM/DL (ref 14–18)
MCH RBC QN AUTO: 35.5 PG (ref 26–33)
MCHC RBC AUTO-ENTMCNC: 33.5 GM/DL (ref 32.2–35.5)
MCV RBC AUTO: 106 FL (ref 80–94)
OPIATES, URINE: NEGATIVE
OXYCODONE: NEGATIVE
PHENCYCLIDINE QUANTITATIVE URINE: NEGATIVE
PLATELET # BLD: 255 THOU/MM3 (ref 130–400)
PMV BLD AUTO: 10 FL (ref 9.4–12.4)
POTASSIUM REFLEX MAGNESIUM: 4.4 MEQ/L (ref 3.5–5.2)
RBC # BLD: 4.14 MILL/MM3 (ref 4.7–6.1)
SODIUM BLD-SCNC: 140 MEQ/L (ref 135–145)
TOTAL PROTEIN: 6.8 G/DL (ref 6.1–8)
WBC # BLD: 5.8 THOU/MM3 (ref 4.8–10.8)

## 2022-02-10 PROCEDURE — 6360000004 HC RX CONTRAST MEDICATION: Performed by: PHYSICIAN ASSISTANT

## 2022-02-10 PROCEDURE — 76705 ECHO EXAM OF ABDOMEN: CPT

## 2022-02-10 PROCEDURE — 6370000000 HC RX 637 (ALT 250 FOR IP): Performed by: PHYSICIAN ASSISTANT

## 2022-02-10 PROCEDURE — 36415 COLL VENOUS BLD VENIPUNCTURE: CPT

## 2022-02-10 PROCEDURE — A9579 GAD-BASE MR CONTRAST NOS,1ML: HCPCS | Performed by: PHYSICIAN ASSISTANT

## 2022-02-10 PROCEDURE — 1200000000 HC SEMI PRIVATE

## 2022-02-10 PROCEDURE — 80307 DRUG TEST PRSMV CHEM ANLYZR: CPT

## 2022-02-10 PROCEDURE — 85027 COMPLETE CBC AUTOMATED: CPT

## 2022-02-10 PROCEDURE — 74183 MRI ABD W/O CNTR FLWD CNTR: CPT

## 2022-02-10 PROCEDURE — 99233 SBSQ HOSP IP/OBS HIGH 50: CPT | Performed by: PHYSICIAN ASSISTANT

## 2022-02-10 PROCEDURE — 6360000002 HC RX W HCPCS: Performed by: PHYSICIAN ASSISTANT

## 2022-02-10 PROCEDURE — 2580000003 HC RX 258: Performed by: PHYSICIAN ASSISTANT

## 2022-02-10 PROCEDURE — 80053 COMPREHEN METABOLIC PANEL: CPT

## 2022-02-10 PROCEDURE — 82105 ALPHA-FETOPROTEIN SERUM: CPT

## 2022-02-10 RX ORDER — PHENOBARBITAL SODIUM 65 MG/ML
260 INJECTION INTRAMUSCULAR
Status: DISCONTINUED | OUTPATIENT
Start: 2022-02-10 | End: 2022-02-11

## 2022-02-10 RX ORDER — PHENOBARBITAL SODIUM 65 MG/ML
130 INJECTION INTRAMUSCULAR
Status: DISCONTINUED | OUTPATIENT
Start: 2022-02-10 | End: 2022-02-11

## 2022-02-10 RX ADMIN — Medication 100 MG: at 08:27

## 2022-02-10 RX ADMIN — ONDANSETRON 4 MG: 4 TABLET, ORALLY DISINTEGRATING ORAL at 13:06

## 2022-02-10 RX ADMIN — PHENOBARBITAL 64.8 MG: 32.4 TABLET ORAL at 21:13

## 2022-02-10 RX ADMIN — LISINOPRIL 10 MG: 10 TABLET ORAL at 08:27

## 2022-02-10 RX ADMIN — GADOTERIDOL 20 ML: 279.3 INJECTION, SOLUTION INTRAVENOUS at 09:35

## 2022-02-10 RX ADMIN — Medication 1 TABLET: at 08:27

## 2022-02-10 RX ADMIN — METOPROLOL TARTRATE 25 MG: 25 TABLET, FILM COATED ORAL at 22:02

## 2022-02-10 RX ADMIN — SODIUM CHLORIDE, PRESERVATIVE FREE 10 ML: 5 INJECTION INTRAVENOUS at 08:26

## 2022-02-10 RX ADMIN — SODIUM CHLORIDE: 9 INJECTION, SOLUTION INTRAVENOUS at 22:42

## 2022-02-10 RX ADMIN — ESCITALOPRAM OXALATE 30 MG: 20 TABLET ORAL at 08:28

## 2022-02-10 RX ADMIN — FOLIC ACID 1 MG: 1 TABLET ORAL at 08:28

## 2022-02-10 RX ADMIN — ENOXAPARIN SODIUM 40 MG: 100 INJECTION SUBCUTANEOUS at 21:13

## 2022-02-10 RX ADMIN — METOPROLOL TARTRATE 25 MG: 25 TABLET, FILM COATED ORAL at 08:28

## 2022-02-10 RX ADMIN — PANTOPRAZOLE SODIUM 40 MG: 40 TABLET, DELAYED RELEASE ORAL at 05:48

## 2022-02-10 RX ADMIN — PHENOBARBITAL SODIUM 291.85 MG: 65 INJECTION INTRAMUSCULAR at 04:30

## 2022-02-10 RX ADMIN — PHENOBARBITAL 64.8 MG: 32.4 TABLET ORAL at 08:26

## 2022-02-10 RX ADMIN — VENLAFAXINE 75 MG: 37.5 TABLET ORAL at 08:27

## 2022-02-10 ASSESSMENT — PAIN SCALES - GENERAL
PAINLEVEL_OUTOF10: 0
PAINLEVEL_OUTOF10: 0
PAINLEVEL_OUTOF10: 3
PAINLEVEL_OUTOF10: 0

## 2022-02-10 ASSESSMENT — PAIN DESCRIPTION - LOCATION: LOCATION: ABDOMEN

## 2022-02-10 ASSESSMENT — PAIN DESCRIPTION - PAIN TYPE: TYPE: ACUTE PAIN

## 2022-02-10 ASSESSMENT — PAIN DESCRIPTION - ORIENTATION: ORIENTATION: RIGHT

## 2022-02-10 ASSESSMENT — PATIENT HEALTH QUESTIONNAIRE - PHQ9: SUM OF ALL RESPONSES TO PHQ QUESTIONS 1-9: 21

## 2022-02-10 NOTE — PROGRESS NOTES
Hospitalist Progress Note    Patient:  Ralph Parksis    Unit/Bed:8A-06/006-A  YOB: 1975  MRN: [id number]   Acct: [de-identified]   PCP: Neha Landis  Code Status: Full Code  Date of Admission: 2/9/2022    Expected Discharge: 2/11? Disposition: Home. Day 2/3 phenobarb protocol. Assessment/Plan:    1. Chronic alcohol abuse, with acute withdrawal: Start phenobarb protocol. MTV, folic acid, thiamine. IVF. Seizure precautions. Addiction services consult.      2. Hepatic steatosis, hepatomegaly: secondary to chronic etoh abuse. Pt will need GI referral at discharge to continue monitoring. CT AP 1/26/2022 at [address] reported \"unchanged diffuse hepatic fatty infiltration. There is now a multilobulated structure in the interior right hepatic lobe. \"   - No mass was identified on liver US. MRI done, reported a vague area of T2 hyperintense signal in the inferior right hepatic lobe, lack of abnormal enhancement suggestive of focal fat. No true liver lesion identified.   - LFTs have trended down. Will check AFP to r/o true mass but appears related to hepatic steatosis. 3. Essential HTN: Resume home metoprolol, lisinopril. BP stable.      4. Sinus tachycardia: likely d/t #1. IVF, resume home metoprolol. Resolved.      5. Depression: cont home meds. 6. Macrocytosis without anemia: secondary to etoh abuse. Check vit B12/folate. Chief Complaint: abd pain, alcohol withdrawal    HPI / Hospital Course: This is a 55year old male with PMHx alcohol dependence, HTN, liver disease, MDD who presented to [facility] for etoh withdrawal. Patient states that he came in because of \"his liver. \" He states that for approx 3 weeks he has had RUQ abdominal pain and that he can \"feel his liver getting bigger. \" Patient states he was seen at [facility] ED about a week ago and was told on imaging that he had a liver mass that he needed to have further worked up outpatient. He reports associated N/V.  States that on Sunday he had an episode of \"coughing up blood\" after vomiting, none since. Currently his abd pain is 5/10. Patient states that he typically drinks about 2L of liquor daily. He states he cut back to about a 1/2L daily in attempt to get through withdrawal on his own prior to further workup for his liver but that the detox symptoms have been to severe. He currently reports anxiety, tremors, palpitations, diaphoresis, states he had visual hallucinations this am. Patient admitted to hositalist service for further management. Subjective (past 24 hours): Patient reports headache, anxiety, diaphoresis. Tremors have improved. No hallucinations or seizure activity. Reports continued abdominal pain, nausea. No vomiting. No fever/chills, sob, cp. Day 2/3 phenobarb. MRI was not suggestive of a true liver lesion. AFP ordered. ROS: Pertinent positives as noted in HPI. All other systems reviewed and negative. Past medical history, family history, social history and allergies reviewed again and is unchanged since admission. Medications:  Reviewed. Infusion Medications    sodium chloride      sodium chloride 100 mL/hr at 02/09/22 2200     Scheduled Medications    escitalopram  30 mg Oral Daily    folic acid  1 mg Oral Daily    lidocaine  3 patch TransDERmal Daily    lisinopril  10 mg Oral Daily    metoprolol tartrate  25 mg Oral BID    multivitamin  1 tablet Oral Daily    pantoprazole  40 mg Oral QAM AC    thiamine  100 mg Oral Daily    venlafaxine  75 mg Oral Daily    sodium chloride flush  10 mL IntraVENous 2 times per day    enoxaparin  40 mg SubCUTAneous Q24H    PHENobarbital  64.8 mg Oral BID    Followed by   Mckenzie Arteaga ON 2/11/2022] PHENobarbital  32.4 mg Oral BID     PRN Meds:  PHENobarbital **OR** PHENobarbital **OR** PHENobarbital IVPB, sodium chloride flush, sodium chloride, ondansetron **OR** ondansetron, polyethylene glycol, acetaminophen **OR** acetaminophen, potassium chloride **OR** potassium alternative oral replacement **OR** potassium chloride, magnesium sulfate    I/O:     Intake/Output Summary (Last 24 hours) at 2/10/2022 1113  Last data filed at 2/10/2022 6689  Gross per 24 hour   Intake 1878 ml   Output 400 ml   Net 1478 ml       Diet:  ADULT DIET; Regular    Exam:  BP (!) 145/72   Pulse 88   Temp 97 °F (36.1 °C) (Oral)   Resp 16   Ht 5' 10\" (1.778 m)   Wt 213 lb (96.6 kg)   SpO2 96%   BMI 30.56 kg/m²   General:   Pleasant male. NAD. HEENT:  normocephalic and atraumatic. No scleral icterus. PERR. Neck: supple. No JVD. No thyromegaly. Lungs: clear to auscultation. No retractions  Cardiac: RRR without murmur. Abdomen: soft. Nontender. Bowel sounds positive. Extremities:  No clubbing, cyanosis, or edema x 4. Vasculature: capillary refill < 3 seconds. Palpable LE pulses bilaterally. Skin:  warm and diaphoretic. Psych:  Alert and oriented x3. Affect appropriate  Lymph:  No supraclavicular adenopathy. Neurologic:  No focal deficit. No seizures. Data: (All radiographs, tracings, PFTs, and imaging are personally viewed and interpreted unless otherwise noted)  Labs:   Recent Labs     02/09/22  1127 02/10/22  0528   WBC 7.5 5.8   HGB 17.3 14.7   HCT 49.1 43.9    255     Recent Labs     02/09/22  1127 02/10/22  0528    140   K 3.9 4.4   CL 97* 103   CO2 23 27   BUN 12 13   CREATININE 1.1 1.0   CALCIUM 10.2 8.8     Recent Labs     02/09/22  1127 02/10/22  0528   AST 88* 62*   ALT 77* 60   BILITOT 0.4 0.6   ALKPHOS 106 82     Recent Labs     02/09/22  1148   INR 0.98     No results for input(s): Noemi Dalal in the last 72 hours.   Urinalysis:   Lab Results   Component Value Date    NITRU NEGATIVE 03/05/2019    WBCUA 0-2 03/05/2019    BACTERIA NONE 03/05/2019    RBCUA 0-2 03/05/2019    BLOODU NEGATIVE 03/05/2019    SPECGRAV 1.028 05/18/2014    GLUCOSEU NEGATIVE 03/05/2019     Urine culture: No results found for: LABURIN  Micro:   Blood culture #1: No results found for: BC  Blood culture #2:No results found for: Norma Pierce  Organism:No results found for: ORG    No results found for: LABGRAM  MRSA culture only:No results found for: [address]   Respiratory culture: No results found for: CULTRESP  Aerobic and Anaerobic :  No results found for: LABAERO  No results found for: [facility]    Radiology Reports:  MRI ABDOMEN W WO CONTRAST   Final Result   A vague area of T2 hyperintense signal in the inferior right hepatic lobe posteriorly exhibits localized diminished signal intensity on out of phase imaging and lack of abnormal enhancement suggestive of focal fat. No true liver lesion is identified. Correlate with liver function tests, liver serologies and AFP levels. In absence of clinical risk factors a 3 month follow-up MRI utilizing a liver lesion protocol is advised to confirm stability. **This report has been created using voice recognition software. It may contain minor errors which are inherent in voice recognition technology. **      Final report electronically signed by Dr Melo Mcgill on 2/10/2022 10:45 AM      US LIVER   Final Result   1. No mass lesion is identified within the liver. Recommend correlating    with outside imaging or obtaining MRI of the abdomen with liver protocol    for further evaluation given history. 2. Coarsened hepatic echogenicity a finding which can indicate    hepatocellular disease including steatosis. Hepatomegaly with the liver    measuring 21.4 cm. This document has been electronically signed by: Anthony Rooney DO, MBA on    02/10/2022 02:50 AM        MRI ABDOMEN W WO CONTRAST    Result Date: 2/10/2022  PROCEDURE: MRI ABDOMEN W WO CONTRAST CLINICAL INFORMATION: Right hepatic lobe mass identified on CT. COMPARISON: Liver ultrasound 2/10/2022. CT abdomen and pelvis 4/17/15. TECHNIQUE: Routine MRI abdomen without and with IV contrast.  CONTRAST: 20 cc ProHance contrast material. FINDINGS: Lung bases: Unremarkable. Liver/gallbladder/bilary tree: The liver is normal in size with smooth contour. The liver exhibits diffuse loss of signal intensity on the out of phase imaging particularly in the right hepatic lobe posteriorly where. Ill-defined increased T2 signal intensity without associated abnormal enhancement following contrast administration is noted in the right hepatic lobe posteriorly corresponding to what appears to be focal fat. The lack of mass effect and normal traversing vessels through the right hepatic lobe posteriorly is also suggestive of fatty infiltration with islands of focal fat rather than a true liver lesion. No restricted diffusion is identified on the diffusion-weighted imaging. No gallstones or biliary ductal dilatation is observed. Pancreas: Normal. Spleen : Normal. Adrenal glands: Normal. Kidneys/ ureters: No hydronephrosis or hydroureter is present. No renal lesions are identified. Gastrointestinal: No dilated small or large bowel loops are observed. No free fluid or fluid collections are identified. Retroperitoneum / lymph nodes: The aorta is not dilated. No lymphadenopathy is present. Musculoskeletal: S-shaped scoliosis is unchanged. The visualized skeletal structures appear intact. A vague area of T2 hyperintense signal in the inferior right hepatic lobe posteriorly exhibits localized diminished signal intensity on out of phase imaging and lack of abnormal enhancement suggestive of focal fat. No true liver lesion is identified. Correlate with liver function tests, liver serologies and AFP levels. In absence of clinical risk factors a 3 month follow-up MRI utilizing a liver lesion protocol is advised to confirm stability. **This report has been created using voice recognition software. It may contain minor errors which are inherent in voice recognition technology. ** Final report electronically signed by Dr Radha Frank on 2/10/2022 10:45 AM    US LIVER    Result Date: 2/10/2022  US abdomen limited Comparison: None Findings: The pancreas is not visualized on the current examination. Coarsened hepatic echogenicity a finding which can indicate hepatocellular disease including steatosis. No mass lesion is identified within the liver. Recommend correlating with outside imaging or obtaining MRI of the abdomen with liver protocol for further details. Hepatomegaly with liver measuring 21.4 cm. There is no intrahepatic bile duct dilatation. The common duct is 5 mm in diameter. The gallbladder is normal. There is no sonographic Saucedo sign. The gallbladder wall measures 1-2 mm. The main portal vein is antegrade. No ascites. 1. No mass lesion is identified within the liver. Recommend correlating with outside imaging or obtaining MRI of the abdomen with liver protocol for further evaluation given history. 2. Coarsened hepatic echogenicity a finding which can indicate hepatocellular disease including steatosis. Hepatomegaly with the liver measuring 21.4 cm.  This document has been electronically signed by: Lowery Primrose, DO, MBA on 02/10/2022 02:50 AM        Tele:   [] yes             [x] no      Active Hospital Problems    Diagnosis Date Noted    Alcohol withdrawal, uncomplicated ([facility] [P20.881] 12/27/2020       Electronically signed by Eladio Lopez PA-C on 2/10/2022 at 11:13 AM

## 2022-02-10 NOTE — PLAN OF CARE
Problem: Falls - Risk of:  Goal: Will remain free from falls  Description: Will remain free from falls  Outcome: Ongoing  Goal: Absence of physical injury  Description: Absence of physical injury  Outcome: Ongoing     Problem: Pain:  Goal: Pain level will decrease  Description: Pain level will decrease  Outcome: Ongoing  Goal: Control of acute pain  Description: Control of acute pain  Outcome: Ongoing  Goal: Control of chronic pain  Description: Control of chronic pain  Outcome: Ongoing  Care plan reviewed with patient. Patient verbalizes understanding of the plan of care and contribute to goal setting.

## 2022-02-10 NOTE — PROGRESS NOTES
Brief Intervention and Referral to Treatment Summary    Patient was provided PHQ-9, AUDIT and DAST Screening:      PHQ-9 Score: 21  AUDIT Score:  37  DAST Score:  0    Patients substance use is considered     Dependent    Patients depression is considered:     Severe    Brief Education Was Provided    Patient was receptive    Brief Intervention Is Provided (Only for AUDIT or DAST)     Patient reports readiness to decrease and/or stop use and a plan was discussed     Recommendations/Referrals for Brief and/or Specialized Treatment Provided to Patient     Patient has no current follow-up. Pt did not express a strong desire to get connected, however, pt was agreeable to community resources being provided. Informed pt of Didi Fleming and [facility].

## 2022-02-10 NOTE — CARE COORDINATION
2/10/22, 7:09 AM EST  DISCHARGE PLANNING EVALUATION:    Dominique Farooq       Admitted: 2/9/2022/ [facility] day: 1   Location: 8A-06/006-A Reason for admit: Alcohol withdrawal, uncomplicated ([facility] [R26.194]   PMH:  has a past medical history of Alcohol dependence ([facility] Arthritis, Hyperlipidemia, Hypertension, Liver disease, Panic attacks, Pneumonia, Psychiatric problem, and Scoliosis. Procedure: No.  Barriers to Discharge: To ER with ETOH abuse. Seizure precautions Addiction Services consulted. Anxiety. MRI abd today. Hepatomegaly noted on US of liver. PCP: Tatiana Campbell  Readmission Risk Score: 8.3 ( )%    Patient Goals/Plan/Treatment Preferences: Pt off unit at time of CM visit. Addictions is consulted. CM to see once Addictions has visited with pt. Transportation/Food Security/Housekeeping Addressed:  No issues identified.

## 2022-02-11 VITALS
HEART RATE: 80 BPM | TEMPERATURE: 98 F | BODY MASS INDEX: 31.85 KG/M2 | DIASTOLIC BLOOD PRESSURE: 86 MMHG | HEIGHT: 70 IN | WEIGHT: 222.44 LBS | RESPIRATION RATE: 18 BRPM | OXYGEN SATURATION: 94 % | SYSTOLIC BLOOD PRESSURE: 136 MMHG

## 2022-02-11 LAB
ALBUMIN SERPL-MCNC: 3.8 G/DL (ref 3.5–5.1)
ALP BLD-CCNC: 80 U/L (ref 38–126)
ALT SERPL-CCNC: 53 U/L (ref 11–66)
ANION GAP SERPL CALCULATED.3IONS-SCNC: 9 MEQ/L (ref 8–16)
AST SERPL-CCNC: 53 U/L (ref 5–40)
BILIRUB SERPL-MCNC: 0.5 MG/DL (ref 0.3–1.2)
BILIRUBIN DIRECT: < 0.2 MG/DL (ref 0–0.3)
BUN BLDV-MCNC: 11 MG/DL (ref 7–22)
CALCIUM SERPL-MCNC: 8.4 MG/DL (ref 8.5–10.5)
CHLORIDE BLD-SCNC: 102 MEQ/L (ref 98–111)
CO2: 26 MEQ/L (ref 23–33)
CREAT SERPL-MCNC: 1 MG/DL (ref 0.4–1.2)
ERYTHROCYTE [DISTWIDTH] IN BLOOD BY AUTOMATED COUNT: 13 % (ref 11.5–14.5)
ERYTHROCYTE [DISTWIDTH] IN BLOOD BY AUTOMATED COUNT: 51 FL (ref 35–45)
FOLATE: 13.3 NG/ML (ref 4.8–24.2)
GFR SERPL CREATININE-BSD FRML MDRD: 80 ML/MIN/1.73M2
GLUCOSE BLD-MCNC: 113 MG/DL (ref 70–108)
HCT VFR BLD CALC: 40.6 % (ref 42–52)
HEMOGLOBIN: 13.7 GM/DL (ref 14–18)
MCH RBC QN AUTO: 36 PG (ref 26–33)
MCHC RBC AUTO-ENTMCNC: 33.7 GM/DL (ref 32.2–35.5)
MCV RBC AUTO: 106.6 FL (ref 80–94)
PLATELET # BLD: 220 THOU/MM3 (ref 130–400)
PMV BLD AUTO: 10.5 FL (ref 9.4–12.4)
POTASSIUM SERPL-SCNC: 5.1 MEQ/L (ref 3.5–5.2)
RBC # BLD: 3.81 MILL/MM3 (ref 4.7–6.1)
SODIUM BLD-SCNC: 137 MEQ/L (ref 135–145)
TOTAL PROTEIN: 6.4 G/DL (ref 6.1–8)
VITAMIN B-12: 367 PG/ML (ref 211–911)
WBC # BLD: 5.3 THOU/MM3 (ref 4.8–10.8)

## 2022-02-11 PROCEDURE — 80053 COMPREHEN METABOLIC PANEL: CPT

## 2022-02-11 PROCEDURE — 82746 ASSAY OF FOLIC ACID SERUM: CPT

## 2022-02-11 PROCEDURE — 6360000002 HC RX W HCPCS: Performed by: PHYSICIAN ASSISTANT

## 2022-02-11 PROCEDURE — 36415 COLL VENOUS BLD VENIPUNCTURE: CPT

## 2022-02-11 PROCEDURE — 6370000000 HC RX 637 (ALT 250 FOR IP): Performed by: PHYSICIAN ASSISTANT

## 2022-02-11 PROCEDURE — 82248 BILIRUBIN DIRECT: CPT

## 2022-02-11 PROCEDURE — 82607 VITAMIN B-12: CPT

## 2022-02-11 PROCEDURE — 85027 COMPLETE CBC AUTOMATED: CPT

## 2022-02-11 PROCEDURE — 99239 HOSP IP/OBS DSCHRG MGMT >30: CPT | Performed by: PHYSICIAN ASSISTANT

## 2022-02-11 RX ORDER — LANOLIN ALCOHOL/MO/W.PET/CERES
100 CREAM (GRAM) TOPICAL DAILY
Qty: 30 TABLET | Refills: 1 | Status: ON HOLD | OUTPATIENT
Start: 2022-02-11 | End: 2022-03-08

## 2022-02-11 RX ORDER — PANTOPRAZOLE SODIUM 40 MG/1
40 TABLET, DELAYED RELEASE ORAL
Qty: 30 TABLET | Refills: 1 | Status: ON HOLD | OUTPATIENT
Start: 2022-02-11 | End: 2022-03-08

## 2022-02-11 RX ORDER — FOLIC ACID 1 MG/1
1 TABLET ORAL DAILY
Qty: 30 TABLET | Refills: 1 | Status: ON HOLD | OUTPATIENT
Start: 2022-02-11 | End: 2022-03-08

## 2022-02-11 RX ADMIN — ESCITALOPRAM OXALATE 30 MG: 20 TABLET ORAL at 08:41

## 2022-02-11 RX ADMIN — Medication 100 MG: at 08:41

## 2022-02-11 RX ADMIN — PHENOBARBITAL 32.4 MG: 32.4 TABLET ORAL at 08:41

## 2022-02-11 RX ADMIN — PANTOPRAZOLE SODIUM 40 MG: 40 TABLET, DELAYED RELEASE ORAL at 06:36

## 2022-02-11 RX ADMIN — VENLAFAXINE 75 MG: 37.5 TABLET ORAL at 08:41

## 2022-02-11 RX ADMIN — FOLIC ACID 1 MG: 1 TABLET ORAL at 08:42

## 2022-02-11 RX ADMIN — PHENOBARBITAL SODIUM 260 MG: 65 INJECTION INTRAMUSCULAR at 04:23

## 2022-02-11 RX ADMIN — METOPROLOL TARTRATE 25 MG: 25 TABLET, FILM COATED ORAL at 08:42

## 2022-02-11 RX ADMIN — LISINOPRIL 10 MG: 10 TABLET ORAL at 08:42

## 2022-02-11 RX ADMIN — PHENOBARBITAL 32.4 MG: 32.4 TABLET ORAL at 17:40

## 2022-02-11 RX ADMIN — Medication 1 TABLET: at 08:41

## 2022-02-11 ASSESSMENT — PAIN SCALES - GENERAL
PAINLEVEL_OUTOF10: 0

## 2022-02-11 NOTE — DISCHARGE SUMMARY
Hospitalist Discharge Summary    Patient: Lyssa Farooq  YOB: 1975  MRN: [id number]   Acct: [de-identified]    Primary Care Physician: Darren Gaitan date  2/9/2022    Discharge date:  2/11/2022    Discharge Assessment and Plan:    1. Chronic alcohol abuse, with acute withdrawal: Pt completed 3 days phenobarb prophylactic protocol, tolerated well. No further signs or symptoms of withdrawal. Addiction services was consulted and patient was given resources. Continue MTV, folic acid, thiamine. 2. Hepatic steatosis, hepatomegaly: secondary to chronic etoh abuse. CT AP 1/26/2022 at [address] reported \"unchanged diffuse hepatic fatty infiltration. There is now a multilobulated structure in the interior right hepatic lobe. \" No mass was identified on liver US. MRI done, reported a vague area of T2 hyperintense signal in the inferior right hepatic lobe, lack of abnormal enhancement suggestive of focal fat. No true liver lesion identified. AFP negative. LFTs have trended down. Strongly encouraged ETOH cessation. Follow up with PCP and consider GI referral.     3. Essential HTN: BP stable. Continue home meds. 4. Sinus tachycardia: secondary to etoh withdrawal. Resolved. 5. Deperession: cont home meds. 6. Macrocytosis without anemia: Secondary to etoh abuse. Vit B12/Folate wnl. Chief Complaint on presentation: abd pain, alcohol withdrawal    Initial H&P / Hospital Course: This is a 55year old male with PMHx alcohol dependence, HTN, liver disease, MDD who presented to [facility] for etoh withdrawal. Patient states that he came in because of \"his liver. \" He states that for approx 3 weeks he has had RUQ abdominal pain and that he can \"feel his liver getting bigger. \" Patient states he was seen at [facility] ED about a week ago and was told on imaging that he had a liver mass that he needed to have further worked up outpatient. He reports associated N/V.  States that on Sunday he had an episode of \"coughing up blood\" after vomiting, none since. Currently his abd pain is 5/10. Patient states that he typically drinks about 2L of liquor daily. He states he cut back to about a 1/2L daily in attempt to get through withdrawal on his own prior to further workup for his liver but that the detox symptoms have been to severe. He currently reports anxiety, tremors, palpitations, diaphoresis, states he had visual hallucinations this am. Patient admitted to hositalist service for further management. 2/10: Patient reports headache, anxiety, diaphoresis. Tremors have improved. No hallucinations or seizure activity. Reports continued abdominal pain, nausea. No vomiting. No fever/chills, sob, cp. Day 2/3 phenobarb. MRI was not suggestive of a true liver lesion. AFP ordered. Subjective (day of discharge): Patient reports anxiety and alcohol cravings. He has not yet made any phone calls to the resources available but plans to today. We had a long discussion about maintaining alcohol cessation. The patient has completed phenobarb protocol, tolerated well. He denies fever/chills, sob, cp, abd pain, nvd, tremors, diaphoresis. Patient responded well to medical management. Consultants had signed off or were contacted and agree with discharge plan. The patient was discharged in stable condition with appropriate outpatient follow up arranged.           Physical Exam:-  Vitals: Patient Vitals for the past 24 hrs:   BP Temp Temp src Pulse Resp SpO2 Weight   02/11/22 0842 (!) 154/77 98 °F (36.7 °C) Oral 82 20 98 % --   02/11/22 0348 (!) 155/99 97.6 °F (36.4 °C) Oral 80 18 99 % --   02/10/22 2259 -- -- -- -- -- -- 222 lb 7.1 oz (100.9 kg)   02/10/22 2258 125/80 97.7 °F (36.5 °C) Oral 79 16 97 % --   02/10/22 2100 138/89 97.6 °F (36.4 °C) Oral 82 15 96 % --   02/10/22 1554 131/81 98.1 °F (36.7 °C) Oral 87 16 96 % --   02/10/22 1214 (!) 154/101 98 °F (36.7 °C) Oral 96 18 97 % --     Weight: Weight: 222 lb 7.1 oz (100.9 kg)   24 hour intake/output:     Intake/Output Summary (Last 24 hours) at 2/11/2022 1101  Last data filed at 2/11/2022 0901  Gross per 24 hour   Intake 740 ml   Output --   Net 740 ml       General appearance: No apparent distress, appears stated age and cooperative. HEENT: Normal cephalic, atraumatic without obvious deformity. Pupils equal, round, and reactive to light. Extra ocular muscles intact. Conjunctivae/corneas clear. Neck: Supple, with full range of motion. No jugular venous distention. Trachea midline. Respiratory:  Normal respiratory effort. Clear to auscultation, bilaterally without Rales/Wheezes/Rhonchi. Cardiovascular: Regular rate and rhythm with normal S1/S2 without murmurs, rubs or gallops. Abdomen: Soft, non-tender, non-distended with normal bowel sounds. Musculoskeletal:  No clubbing, cyanosis or edema bilaterally. Skin: Skin color, texture, turgor normal.  No rashes or lesions. Neurologic:  Neurovascularly intact without any focal sensory/motor deficits.  Cranial nerves: II-XII intact, grossly non-focal.  Psychiatric: Alert and oriented, thought content appropriate, normal insight  Capillary Refill: Brisk,< 3 seconds   Peripheral Pulses: +2 palpable, equal bilaterally     Labs :  Recent Results (from the past 72 hour(s))   CBC Auto Differential    Collection Time: 02/09/22 11:27 AM   Result Value Ref Range    WBC 7.5 4.8 - 10.8 thou/mm3    RBC 4.77 4.70 - 6.10 mill/mm3    Hemoglobin 17.3 14.0 - 18.0 gm/dl    Hematocrit 49.1 42.0 - 52.0 %    .9 (H) 80.0 - 94.0 fL    MCH 36.3 (H) 26.0 - 33.0 pg    MCHC 35.2 32.2 - 35.5 gm/dl    RDW-CV 13.0 11.5 - 14.5 %    RDW-SD 49.8 (H) 35.0 - 45.0 fL    Platelets 061 001 - 937 thou/mm3    MPV 10.3 9.4 - 12.4 fL    Seg Neutrophils 55.7 %    Lymphocytes 32.1 %    Monocytes 9.3 %    Eosinophils 1.9 %    Basophils 0.7 %    Immature Granulocytes 0.3 %    Segs Absolute 4.2 1.8 - 7.7 thou/mm3    Lymphocytes Absolute 2.4 1.0 - 4.8 thou/mm3    Monocytes Absolute 0.7 0.4 - 1.3 thou/mm3    Eosinophils Absolute 0.1 0.0 - 0.4 thou/mm3    Basophils Absolute 0.1 0.0 - 0.1 thou/mm3    Immature Grans (Abs) 0.02 0.00 - 0.07 thou/mm3    nRBC 0 /100 wbc   Lipase    Collection Time: 02/09/22 11:27 AM   Result Value Ref Range    Lipase 96.5 (H) 5.6 - 51.3 U/L   Comprehensive Metabolic Panel w/ Reflex to MG    Collection Time: 02/09/22 11:27 AM   Result Value Ref Range    Glucose 131 (H) 70 - 108 mg/dL    CREATININE 1.1 0.4 - 1.2 mg/dL    BUN 12 7 - 22 mg/dL    Sodium 139 135 - 145 meq/L    Potassium reflex Magnesium 3.9 3.5 - 5.2 meq/L    Chloride 97 (L) 98 - 111 meq/L    CO2 23 23 - 33 meq/L    Calcium 10.2 8.5 - 10.5 mg/dL    AST 88 (H) 5 - 40 U/L    Alkaline Phosphatase 106 38 - 126 U/L    Total Protein 8.4 (H) 6.1 - 8.0 g/dL    Albumin 4.9 3.5 - 5.1 g/dL    Total Bilirubin 0.4 0.3 - 1.2 mg/dL    ALT 77 (H) 11 - 66 U/L   Ethanol    Collection Time: 02/09/22 11:27 AM   Result Value Ref Range    ETHYL ALCOHOL, SERUM 0.18 0.00 %   Anion Gap    Collection Time: 02/09/22 11:27 AM   Result Value Ref Range    Anion Gap 19.0 (H) 8.0 - 16.0 meq/L   Glomerular Filtration Rate, Estimated    Collection Time: 02/09/22 11:27 AM   Result Value Ref Range    Est, Glom Filt Rate 72 (A) ml/min/1.73m2   Osmolality    Collection Time: 02/09/22 11:27 AM   Result Value Ref Range    Osmolality Calc 279.1 275.0 - 300.0 mOsmol/kg   Protime-INR    Collection Time: 02/09/22 11:48 AM   Result Value Ref Range    INR 0.98 0.85 - 1.13   EKG 12 Lead    Collection Time: 02/09/22 12:16 PM   Result Value Ref Range    Ventricular Rate 102 BPM    Atrial Rate 102 BPM    P-R Interval 134 ms    QRS Duration 80 ms    Q-T Interval 336 ms    QTc Calculation (Bazett) 437 ms    P Axis 40 degrees    R Axis 69 degrees    T Axis 28 degrees   CBC    Collection Time: 02/10/22  5:28 AM   Result Value Ref Range    WBC 5.8 4.8 - 10.8 thou/mm3    RBC 4.14 (L) 4.70 - 6.10 mill/mm3    Hemoglobin 14.7 14.0 - 18.0 gm/dl    Hematocrit 43.9 42.0 - 52.0 %    .0 (H) 80.0 - 94.0 fL    MCH 35.5 (H) 26.0 - 33.0 pg    MCHC 33.5 32.2 - 35.5 gm/dl    RDW-CV 13.2 11.5 - 14.5 %    RDW-SD 52.1 (H) 35.0 - 45.0 fL    Platelets 466 648 - 584 thou/mm3    MPV 10.0 9.4 - 12.4 fL   Comprehensive Metabolic Panel w/ Reflex to MG    Collection Time: 02/10/22  5:28 AM   Result Value Ref Range    Glucose 123 (H) 70 - 108 mg/dL    CREATININE 1.0 0.4 - 1.2 mg/dL    BUN 13 7 - 22 mg/dL    Sodium 140 135 - 145 meq/L    Potassium reflex Magnesium 4.4 3.5 - 5.2 meq/L    Chloride 103 98 - 111 meq/L    CO2 27 23 - 33 meq/L    Calcium 8.8 8.5 - 10.5 mg/dL    AST 62 (H) 5 - 40 U/L    Alkaline Phosphatase 82 38 - 126 U/L    Total Protein 6.8 6.1 - 8.0 g/dL    Albumin 3.9 3.5 - 5.1 g/dL    Total Bilirubin 0.6 0.3 - 1.2 mg/dL    ALT 60 11 - 66 U/L   Anion Gap    Collection Time: 02/10/22  5:28 AM   Result Value Ref Range    Anion Gap 10.0 8.0 - 16.0 meq/L   Glomerular Filtration Rate, Estimated    Collection Time: 02/10/22  5:28 AM   Result Value Ref Range    Est, Glom Filt Rate 80 (A) ml/min/1.73m2   Urine Drug Screen    Collection Time: 02/10/22  5:50 AM   Result Value Ref Range    AMPHETAMINE+METHAMPHETAMINE URINE SCREEN Negative NEGATIVE    Barbiturate Quant, Ur POSITIVE NEGATIVE    Benzodiazepine Quant, Ur Negative NEGATIVE    Cannabinoid Quant, Ur Negative NEGATIVE    Cocaine Metab Quant, Ur Negative NEGATIVE    Opiates, Urine Negative NEGATIVE    Oxycodone Negative NEGATIVE    PCP Quant, Ur Negative NEGATIVE   AFP Tumor Marker    Collection Time: 02/10/22 11:54 AM   Result Value Ref Range    AFP-Tumor Marker 3.0 <8.4 ug/L   Basic Metabolic Panel    Collection Time: 02/11/22  5:56 AM   Result Value Ref Range    Sodium 137 135 - 145 meq/L    Potassium 5.1 3.5 - 5.2 meq/L    Chloride 102 98 - 111 meq/L    CO2 26 23 - 33 meq/L    Glucose 113 (H) 70 - 108 mg/dL    BUN 11 7 - 22 mg/dL    CREATININE 1.0 0.4 - 1.2 mg/dL    Calcium 8.4 (L) 8.5 - 10.5 mg/dL   Hepatic Function Panel Collection Time: 02/11/22  5:56 AM   Result Value Ref Range    Albumin 3.8 3.5 - 5.1 g/dL    Total Bilirubin 0.5 0.3 - 1.2 mg/dL    Bilirubin, Direct <0.2 0.0 - 0.3 mg/dL    Alkaline Phosphatase 80 38 - 126 U/L    AST 53 (H) 5 - 40 U/L    ALT 53 11 - 66 U/L    Total Protein 6.4 6.1 - 8.0 g/dL   CBC    Collection Time: 02/11/22  5:56 AM   Result Value Ref Range    WBC 5.3 4.8 - 10.8 thou/mm3    RBC 3.81 (L) 4.70 - 6.10 mill/mm3    Hemoglobin 13.7 (L) 14.0 - 18.0 gm/dl    Hematocrit 40.6 (L) 42.0 - 52.0 %    .6 (H) 80.0 - 94.0 fL    MCH 36.0 (H) 26.0 - 33.0 pg    MCHC 33.7 32.2 - 35.5 gm/dl    RDW-CV 13.0 11.5 - 14.5 %    RDW-SD 51.0 (H) 35.0 - 45.0 fL    Platelets 718 679 - 163 thou/mm3    MPV 10.5 9.4 - 12.4 fL   Vitamin B12 & folate    Collection Time: 02/11/22  5:56 AM   Result Value Ref Range    Vitamin B-12 367 211 - 911 pg/mL    Folate 13.3 4.8 - 24.2 ng/mL   Anion Gap    Collection Time: 02/11/22  5:56 AM   Result Value Ref Range    Anion Gap 9.0 8.0 - 16.0 meq/L   Glomerular Filtration Rate, Estimated    Collection Time: 02/11/22  5:56 AM   Result Value Ref Range    Est, Glom Filt Rate 80 (A) ml/min/1.73m2        Microbiology:    Blood culture #1: No results found for: BC  Blood culture #2:No results found for: BLOODCULT2  Organism:  No results found for: LABGRAM  MRSA culture only:No results found for: [facility]  Urine culture: No results found for: LABURIN  No results found for: ORG   Respiratory culture: No results found for: CULTRESP  Aerobic and Anaerobic :  No results found for: LABAERO  No results found for: LABANAE    Urinalysis:     Lab Results   Component Value Date    NITRU NEGATIVE 03/05/2019    WBCUA 0-2 03/05/2019    BACTERIA NONE 03/05/2019    RBCUA 0-2 03/05/2019    BLOODU NEGATIVE 03/05/2019    SPECGRAV 1.028 05/18/2014    GLUCOSEU NEGATIVE 03/05/2019       Radiology:  MRI ABDOMEN W WO CONTRAST    Result Date: 2/10/2022  PROCEDURE: MRI ABDOMEN W WO CONTRAST CLINICAL INFORMATION: Right hepatic lobe mass identified on CT. COMPARISON: Liver ultrasound 2/10/2022. CT abdomen and pelvis 4/17/15. TECHNIQUE: Routine MRI abdomen without and with IV contrast.  CONTRAST: 20 cc ProHance contrast material. FINDINGS: Lung bases: Unremarkable. Liver/gallbladder/bilary tree: The liver is normal in size with smooth contour. The liver exhibits diffuse loss of signal intensity on the out of phase imaging particularly in the right hepatic lobe posteriorly where. Ill-defined increased T2 signal intensity without associated abnormal enhancement following contrast administration is noted in the right hepatic lobe posteriorly corresponding to what appears to be focal fat. The lack of mass effect and normal traversing vessels through the right hepatic lobe posteriorly is also suggestive of fatty infiltration with islands of focal fat rather than a true liver lesion. No restricted diffusion is identified on the diffusion-weighted imaging. No gallstones or biliary ductal dilatation is observed. Pancreas: Normal. Spleen : Normal. Adrenal glands: Normal. Kidneys/ ureters: No hydronephrosis or hydroureter is present. No renal lesions are identified. Gastrointestinal: No dilated small or large bowel loops are observed. No free fluid or fluid collections are identified. Retroperitoneum / lymph nodes: The aorta is not dilated. No lymphadenopathy is present. Musculoskeletal: S-shaped scoliosis is unchanged. The visualized skeletal structures appear intact. A vague area of T2 hyperintense signal in the inferior right hepatic lobe posteriorly exhibits localized diminished signal intensity on out of phase imaging and lack of abnormal enhancement suggestive of focal fat. No true liver lesion is identified. Correlate with liver function tests, liver serologies and AFP levels. In absence of clinical risk factors a 3 month follow-up MRI utilizing a liver lesion protocol is advised to confirm stability.  **This report has been created using voice recognition software. It may contain minor errors which are inherent in voice recognition technology. ** Final report electronically signed by Dr Kristy Lee on 2/10/2022 10:45 AM    US LIVER    Result Date: 2/10/2022  US abdomen limited Comparison: None Findings: The pancreas is not visualized on the current examination. Coarsened hepatic echogenicity a finding which can indicate hepatocellular disease including steatosis. No mass lesion is identified within the liver. Recommend correlating with outside imaging or obtaining MRI of the abdomen with liver protocol for further details. Hepatomegaly with liver measuring 21.4 cm. There is no intrahepatic bile duct dilatation. The common duct is 5 mm in diameter. The gallbladder is normal. There is no sonographic Saucedo sign. The gallbladder wall measures 1-2 mm. The main portal vein is antegrade. No ascites. 1. No mass lesion is identified within the liver. Recommend correlating with outside imaging or obtaining MRI of the abdomen with liver protocol for further evaluation given history. 2. Coarsened hepatic echogenicity a finding which can indicate hepatocellular disease including steatosis. Hepatomegaly with the liver measuring 21.4 cm.  This document has been electronically signed by: Harris Johnson DO, MBA on 02/10/2022 02:50 AM       Consults:   IP CONSULT TO ADDICTION SERVICES    Discharge Medications:      Medication List      CHANGE how you take these medications    lisinopril 10 MG tablet  Commonly known as: PRINIVIL;ZESTRIL  Take 1 tablet by mouth daily  What changed: how much to take        CONTINUE taking these medications    acetaminophen 500 MG tablet  Commonly known as: TYLENOL     escitalopram 20 MG tablet  Commonly known as: Lexapro  Take 1.5 tablets by mouth daily     folic acid 1 MG tablet  Commonly known as: FOLVITE  Take 1 tablet by mouth daily     ibuprofen 800 MG tablet  Commonly known as: ADVIL;MOTRIN  Take 1 tablet by mouth every 8 hours as needed for Pain     metoprolol tartrate 25 MG tablet  Commonly known as: LOPRESSOR  Take 1 tablet by mouth 2 times daily     multivitamin Tabs tablet  Take 1 tablet by mouth daily     pantoprazole 40 MG tablet  Commonly known as: PROTONIX  Take 1 tablet by mouth every morning (before breakfast)     thiamine 100 MG tablet  Take 1 tablet by mouth daily           Where to Get Your Medications      These medications were sent to [facility] [address], [address]  [address] [place] [id number]    Phone: [phone]   · folic acid 1 MG tablet  · pantoprazole 40 MG tablet  · thiamine 100 MG tablet          Patient Instructions:    Discharge lab work: CMP  Activity: activity as tolerated  Diet: ADULT DIET; Regular      Follow-up visits:   Hari Randhawa[address]  [phone]    On 2/23/2022  Follow-up appointment February 23, 2022 at 9am.         Disposition: home  Condition at Discharge: Stable    Time Spent: 60 minutes    Signed: Thank you Elisha Small for the opportunity to be involved in this patient's care.     Electronically signed by Shanon Benz PA-C on 2/11/2022 at 11:01 AM  Discharging Hospitalist

## 2022-02-11 NOTE — PROGRESS NOTES
Utilize [facility] alcohol withdrawal scale (Based on [place] Modified Alcohol Withdrawal Scale). Tabulate score based on classifications including Tremor, Sweating, Hallucination, Orientation, and Agitation. Tremor: 0  Sweatin  Hallucinations: 1  Orientation: 0  Agitation:2  Total Score: 4  Action perform as described below     Tremor:  No tremor is 0 points. Tremor on movement is 1 point. Tremor at rest is 2 points. Sweating: No Sweat 0 points. Moist is 1 point. Drenching sweats is 2 points. Hallucinations: No present 0 points. Dissuadable is 1 point. Not dissuadable is 2 points. Orientation: Oriented 0 points. Vague/detached 1 point. Disoriented/no contact 2 points. Agitation: Calm 0 points. Anxious 1 point. Panicky 2 points. Check scale every 2 hours. Discontinue scoring with 4 consecutive scorings of 0. Scale 0: No phenobarbital given. Re-assess every 60 minutes as needed. Scale 1-3: Phenobarbital 130 mg IV over 3 minutes. Re-assess every 60 minutes as needed. May administer every 60 minutes to a maximum dose of phenobarbital 1040 mg in 24 hours! Scale 4-8: Phenobarbital 260 mg IV over 5 minutes. Re-assess every 60 minutes as needed. May administer every 60 minutes to a maximum dose of phenobarbital 1040mg in 24 hours! Scale 9-10: Transfer to ICU (if not already in ICU). Administer 10mg/kg phenobarbital IV over 60 minutes. Maximum dose phenobarbital is 1040mg in 24 hours!

## 2022-02-11 NOTE — CARE COORDINATION
2/11/22, 10:00 AM EST    Patient goals/plan/ treatment preferences discussed by  and . Patient goals/plan/ treatment preferences reviewed with patient/ family. Patient/ family verbalize understanding of discharge plan and are in agreement with goal/plan/treatment preferences. Understanding was demonstrated using the teach back method. AVS provided by RN at time of discharge, which includes all necessary medical information pertaining to the patients current course of illness, treatment, post-discharge goals of care, and treatment preferences. Addictions [facility] has seen and has provided resources. Pt will have last dose of Pheno this evening and then be discharged. Visited with pt and he is tearful. He states he has been going through this for 30 years. States he does not know what his plan for treatment will be when he leaves. Encouraged pt to get on phone now with various resources to discuss what may work for him. If further questions we can have SW revisit for assisting him. He is from home alone and recently started new job. He drives and is insured. Much support and encouragement offered.

## 2022-03-08 ENCOUNTER — HOSPITAL ENCOUNTER (EMERGENCY)
Age: 47
Discharge: STILL ADMITTED OR ADMINISTRATIVE | DRG: 897 | End: 2022-03-08
Payer: COMMERCIAL

## 2022-03-08 ENCOUNTER — HOSPITAL ENCOUNTER (INPATIENT)
Age: 47
LOS: 4 days | Discharge: HOME | DRG: 897 | End: 2022-03-12
Attending: FAMILY MEDICINE | Admitting: FAMILY MEDICINE
Payer: COMMERCIAL

## 2022-03-08 VITALS
SYSTOLIC BLOOD PRESSURE: 159 MMHG | HEART RATE: 103 BPM | WEIGHT: 220 LBS | RESPIRATION RATE: 16 BRPM | TEMPERATURE: 97.5 F | HEIGHT: 70 IN | DIASTOLIC BLOOD PRESSURE: 94 MMHG | BODY MASS INDEX: 31.5 KG/M2 | OXYGEN SATURATION: 97 %

## 2022-03-08 DIAGNOSIS — F10.139 ALCOHOL ABUSE WITH WITHDRAWAL (HCC): Primary | ICD-10-CM

## 2022-03-08 DIAGNOSIS — F10.230 ALCOHOL DEPENDENCE WITH UNCOMPLICATED WITHDRAWAL (HCC): ICD-10-CM

## 2022-03-08 PROBLEM — F10.239 ALCOHOL DEPENDENCE WITH WITHDRAWAL (HCC): Status: ACTIVE | Noted: 2022-03-08

## 2022-03-08 LAB
ALBUMIN SERPL-MCNC: 4.6 G/DL (ref 3.5–5.1)
ALP BLD-CCNC: 89 U/L (ref 38–126)
ALT SERPL-CCNC: 50 U/L (ref 11–66)
ANION GAP SERPL CALCULATED.3IONS-SCNC: 15 MEQ/L (ref 8–16)
AST SERPL-CCNC: 60 U/L (ref 5–40)
BASOPHILS # BLD: 0.4 %
BASOPHILS ABSOLUTE: 0 THOU/MM3 (ref 0–0.1)
BILIRUB SERPL-MCNC: 0.5 MG/DL (ref 0.3–1.2)
BUN BLDV-MCNC: 15 MG/DL (ref 7–22)
CALCIUM SERPL-MCNC: 9.5 MG/DL (ref 8.5–10.5)
CHLORIDE BLD-SCNC: 100 MEQ/L (ref 98–111)
CO2: 25 MEQ/L (ref 23–33)
CREAT SERPL-MCNC: 1 MG/DL (ref 0.4–1.2)
EKG ATRIAL RATE: 83 BPM
EKG P AXIS: 31 DEGREES
EKG P-R INTERVAL: 130 MS
EKG Q-T INTERVAL: 384 MS
EKG QRS DURATION: 82 MS
EKG QTC CALCULATION (BAZETT): 451 MS
EKG R AXIS: 60 DEGREES
EKG T AXIS: 31 DEGREES
EKG VENTRICULAR RATE: 83 BPM
EOSINOPHIL # BLD: 3.5 %
EOSINOPHILS ABSOLUTE: 0.2 THOU/MM3 (ref 0–0.4)
ERYTHROCYTE [DISTWIDTH] IN BLOOD BY AUTOMATED COUNT: 12.5 % (ref 11.5–14.5)
ERYTHROCYTE [DISTWIDTH] IN BLOOD BY AUTOMATED COUNT: 46.9 FL (ref 35–45)
ETHYL ALCOHOL, SERUM: < 0.01 %
GFR SERPL CREATININE-BSD FRML MDRD: 80 ML/MIN/1.73M2
GLUCOSE BLD-MCNC: 132 MG/DL (ref 70–108)
HCT VFR BLD CALC: 45.1 % (ref 42–52)
HEMOGLOBIN: 15.4 GM/DL (ref 14–18)
IMMATURE GRANS (ABS): 0.01 THOU/MM3 (ref 0–0.07)
IMMATURE GRANULOCYTES: 0.2 %
LACTIC ACID: 1.7 MMOL/L (ref 0.5–2)
LIPASE: 55.7 U/L (ref 5.6–51.3)
LYMPHOCYTES # BLD: 32 %
LYMPHOCYTES ABSOLUTE: 1.6 THOU/MM3 (ref 1–4.8)
MCH RBC QN AUTO: 34.8 PG (ref 26–33)
MCHC RBC AUTO-ENTMCNC: 34.1 GM/DL (ref 32.2–35.5)
MCV RBC AUTO: 101.8 FL (ref 80–94)
MONOCYTES # BLD: 10.4 %
MONOCYTES ABSOLUTE: 0.5 THOU/MM3 (ref 0.4–1.3)
NUCLEATED RED BLOOD CELLS: 0 /100 WBC
OSMOLALITY CALCULATION: 282.1 MOSMOL/KG (ref 275–300)
PLATELET # BLD: 272 THOU/MM3 (ref 130–400)
PMV BLD AUTO: 9.5 FL (ref 9.4–12.4)
POTASSIUM REFLEX MAGNESIUM: 4.4 MEQ/L (ref 3.5–5.2)
RBC # BLD: 4.43 MILL/MM3 (ref 4.7–6.1)
SEG NEUTROPHILS: 53.5 %
SEGMENTED NEUTROPHILS ABSOLUTE COUNT: 2.7 THOU/MM3 (ref 1.8–7.7)
SODIUM BLD-SCNC: 140 MEQ/L (ref 135–145)
TOTAL PROTEIN: 7.8 G/DL (ref 6.1–8)
TROPONIN T: < 0.01 NG/ML
WBC # BLD: 5.1 THOU/MM3 (ref 4.8–10.8)

## 2022-03-08 PROCEDURE — 93005 ELECTROCARDIOGRAM TRACING: CPT | Performed by: NURSE PRACTITIONER

## 2022-03-08 PROCEDURE — 6360000002 HC RX W HCPCS: Performed by: NURSE PRACTITIONER

## 2022-03-08 PROCEDURE — 82077 ASSAY SPEC XCP UR&BREATH IA: CPT

## 2022-03-08 PROCEDURE — 85025 COMPLETE CBC W/AUTO DIFF WBC: CPT

## 2022-03-08 PROCEDURE — 2580000003 HC RX 258: Performed by: NURSE PRACTITIONER

## 2022-03-08 PROCEDURE — 2060000000 HC ICU INTERMEDIATE R&B

## 2022-03-08 PROCEDURE — 83605 ASSAY OF LACTIC ACID: CPT

## 2022-03-08 PROCEDURE — 83690 ASSAY OF LIPASE: CPT

## 2022-03-08 PROCEDURE — 6370000000 HC RX 637 (ALT 250 FOR IP): Performed by: NURSE PRACTITIONER

## 2022-03-08 PROCEDURE — 99222 1ST HOSP IP/OBS MODERATE 55: CPT | Performed by: NURSE PRACTITIONER

## 2022-03-08 PROCEDURE — 80053 COMPREHEN METABOLIC PANEL: CPT

## 2022-03-08 PROCEDURE — 84484 ASSAY OF TROPONIN QUANT: CPT

## 2022-03-08 PROCEDURE — 93010 ELECTROCARDIOGRAM REPORT: CPT | Performed by: NUCLEAR MEDICINE

## 2022-03-08 RX ORDER — PANTOPRAZOLE SODIUM 40 MG/1
40 TABLET, DELAYED RELEASE ORAL
Status: DISCONTINUED | OUTPATIENT
Start: 2022-03-09 | End: 2022-03-12 | Stop reason: HOSPADM

## 2022-03-08 RX ORDER — ONDANSETRON 4 MG/1
4 TABLET, ORALLY DISINTEGRATING ORAL EVERY 8 HOURS PRN
Status: CANCELLED | OUTPATIENT
Start: 2022-03-08

## 2022-03-08 RX ORDER — FOLIC ACID 1 MG/1
1 TABLET ORAL DAILY
Status: DISCONTINUED | OUTPATIENT
Start: 2022-03-08 | End: 2022-03-12 | Stop reason: HOSPADM

## 2022-03-08 RX ORDER — LANOLIN ALCOHOL/MO/W.PET/CERES
100 CREAM (GRAM) TOPICAL DAILY
Status: DISCONTINUED | OUTPATIENT
Start: 2022-03-08 | End: 2022-03-12 | Stop reason: HOSPADM

## 2022-03-08 RX ORDER — SODIUM CHLORIDE 9 MG/ML
25 INJECTION, SOLUTION INTRAVENOUS PRN
Status: DISCONTINUED | OUTPATIENT
Start: 2022-03-08 | End: 2022-03-12 | Stop reason: HOSPADM

## 2022-03-08 RX ORDER — ACETAMINOPHEN 325 MG/1
650 TABLET ORAL EVERY 6 HOURS PRN
Status: CANCELLED | OUTPATIENT
Start: 2022-03-08

## 2022-03-08 RX ORDER — POLYETHYLENE GLYCOL 3350 17 G/17G
17 POWDER, FOR SOLUTION ORAL DAILY PRN
Status: DISCONTINUED | OUTPATIENT
Start: 2022-03-08 | End: 2022-03-12 | Stop reason: HOSPADM

## 2022-03-08 RX ORDER — ACETAMINOPHEN 325 MG/1
650 TABLET ORAL EVERY 6 HOURS PRN
Status: DISCONTINUED | OUTPATIENT
Start: 2022-03-08 | End: 2022-03-12 | Stop reason: HOSPADM

## 2022-03-08 RX ORDER — SODIUM CHLORIDE 9 MG/ML
25 INJECTION, SOLUTION INTRAVENOUS PRN
Status: CANCELLED | OUTPATIENT
Start: 2022-03-08

## 2022-03-08 RX ORDER — SODIUM CHLORIDE 0.9 % (FLUSH) 0.9 %
5-40 SYRINGE (ML) INJECTION EVERY 12 HOURS SCHEDULED
Status: DISCONTINUED | OUTPATIENT
Start: 2022-03-08 | End: 2022-03-12 | Stop reason: HOSPADM

## 2022-03-08 RX ORDER — POLYETHYLENE GLYCOL 3350 17 G/17G
17 POWDER, FOR SOLUTION ORAL DAILY PRN
Status: CANCELLED | OUTPATIENT
Start: 2022-03-08

## 2022-03-08 RX ORDER — ONDANSETRON 2 MG/ML
4 INJECTION INTRAMUSCULAR; INTRAVENOUS EVERY 6 HOURS PRN
Status: DISCONTINUED | OUTPATIENT
Start: 2022-03-08 | End: 2022-03-12 | Stop reason: HOSPADM

## 2022-03-08 RX ORDER — MULTIVITAMIN WITH IRON
1 TABLET ORAL DAILY
Status: CANCELLED | OUTPATIENT
Start: 2022-03-08

## 2022-03-08 RX ORDER — LANOLIN ALCOHOL/MO/W.PET/CERES
100 CREAM (GRAM) TOPICAL DAILY
Status: CANCELLED | OUTPATIENT
Start: 2022-03-08

## 2022-03-08 RX ORDER — PHENOBARBITAL 32.4 MG/1
32.4 TABLET ORAL 2 TIMES DAILY
Status: COMPLETED | OUTPATIENT
Start: 2022-03-09 | End: 2022-03-10

## 2022-03-08 RX ORDER — ONDANSETRON 4 MG/1
4 TABLET, ORALLY DISINTEGRATING ORAL EVERY 8 HOURS PRN
Status: DISCONTINUED | OUTPATIENT
Start: 2022-03-08 | End: 2022-03-12 | Stop reason: HOSPADM

## 2022-03-08 RX ORDER — SODIUM CHLORIDE 0.9 % (FLUSH) 0.9 %
5-40 SYRINGE (ML) INJECTION EVERY 12 HOURS SCHEDULED
Status: CANCELLED | OUTPATIENT
Start: 2022-03-08

## 2022-03-08 RX ORDER — MULTIVITAMIN WITH IRON
1 TABLET ORAL DAILY
Status: DISCONTINUED | OUTPATIENT
Start: 2022-03-08 | End: 2022-03-12 | Stop reason: HOSPADM

## 2022-03-08 RX ORDER — PHENOBARBITAL 32.4 MG/1
64.8 TABLET ORAL 2 TIMES DAILY
Status: COMPLETED | OUTPATIENT
Start: 2022-03-09 | End: 2022-03-09

## 2022-03-08 RX ORDER — ACETAMINOPHEN 650 MG/1
650 SUPPOSITORY RECTAL EVERY 6 HOURS PRN
Status: CANCELLED | OUTPATIENT
Start: 2022-03-08

## 2022-03-08 RX ORDER — LISINOPRIL 20 MG/1
20 TABLET ORAL DAILY
Status: DISCONTINUED | OUTPATIENT
Start: 2022-03-08 | End: 2022-03-12 | Stop reason: HOSPADM

## 2022-03-08 RX ORDER — LISINOPRIL 20 MG/1
20 TABLET ORAL DAILY
Status: CANCELLED | OUTPATIENT
Start: 2022-03-08

## 2022-03-08 RX ORDER — FOLIC ACID 1 MG/1
1 TABLET ORAL DAILY
Status: CANCELLED | OUTPATIENT
Start: 2022-03-08

## 2022-03-08 RX ORDER — SODIUM CHLORIDE 0.9 % (FLUSH) 0.9 %
5-40 SYRINGE (ML) INJECTION PRN
Status: DISCONTINUED | OUTPATIENT
Start: 2022-03-08 | End: 2022-03-12 | Stop reason: HOSPADM

## 2022-03-08 RX ORDER — PANTOPRAZOLE SODIUM 40 MG/1
40 TABLET, DELAYED RELEASE ORAL
Status: CANCELLED | OUTPATIENT
Start: 2022-03-09

## 2022-03-08 RX ORDER — SODIUM CHLORIDE 0.9 % (FLUSH) 0.9 %
5-40 SYRINGE (ML) INJECTION PRN
Status: CANCELLED | OUTPATIENT
Start: 2022-03-08

## 2022-03-08 RX ORDER — 0.9 % SODIUM CHLORIDE 0.9 %
1000 INTRAVENOUS SOLUTION INTRAVENOUS ONCE
Status: DISCONTINUED | OUTPATIENT
Start: 2022-03-08 | End: 2022-03-08 | Stop reason: HOSPADM

## 2022-03-08 RX ORDER — ACETAMINOPHEN 650 MG/1
650 SUPPOSITORY RECTAL EVERY 6 HOURS PRN
Status: DISCONTINUED | OUTPATIENT
Start: 2022-03-08 | End: 2022-03-12 | Stop reason: HOSPADM

## 2022-03-08 RX ORDER — ONDANSETRON 2 MG/ML
4 INJECTION INTRAMUSCULAR; INTRAVENOUS EVERY 6 HOURS PRN
Status: CANCELLED | OUTPATIENT
Start: 2022-03-08

## 2022-03-08 RX ADMIN — Medication 1 TABLET: at 13:50

## 2022-03-08 RX ADMIN — PHENOBARBITAL 64.8 MG: 32.4 TABLET ORAL at 23:46

## 2022-03-08 RX ADMIN — PHENOBARBITAL SODIUM 291.85 MG: 65 INJECTION INTRAMUSCULAR at 13:29

## 2022-03-08 RX ADMIN — SODIUM CHLORIDE, PRESERVATIVE FREE 10 ML: 5 INJECTION INTRAVENOUS at 20:41

## 2022-03-08 RX ADMIN — LISINOPRIL 20 MG: 20 TABLET ORAL at 13:51

## 2022-03-08 RX ADMIN — METOPROLOL TARTRATE 25 MG: 25 TABLET, FILM COATED ORAL at 13:50

## 2022-03-08 RX ADMIN — PHENOBARBITAL SODIUM 291.85 MG: 65 INJECTION INTRAMUSCULAR at 17:28

## 2022-03-08 RX ADMIN — ONDANSETRON 4 MG: 4 TABLET, ORALLY DISINTEGRATING ORAL at 13:51

## 2022-03-08 RX ADMIN — Medication 100 MG: at 13:50

## 2022-03-08 RX ADMIN — METOPROLOL TARTRATE 25 MG: 25 TABLET, FILM COATED ORAL at 20:41

## 2022-03-08 RX ADMIN — FOLIC ACID 1 MG: 1 TABLET ORAL at 13:50

## 2022-03-08 RX ADMIN — PHENOBARBITAL SODIUM 291.85 MG: 65 INJECTION INTRAMUSCULAR at 20:40

## 2022-03-08 RX ADMIN — ESCITALOPRAM OXALATE 30 MG: 20 TABLET ORAL at 13:50

## 2022-03-08 ASSESSMENT — PAIN DESCRIPTION - LOCATION: LOCATION: ABDOMEN

## 2022-03-08 ASSESSMENT — PAIN SCALES - GENERAL: PAINLEVEL_OUTOF10: 4

## 2022-03-08 ASSESSMENT — PAIN DESCRIPTION - PAIN TYPE: TYPE: CHRONIC PAIN

## 2022-03-08 ASSESSMENT — PAIN DESCRIPTION - ORIENTATION: ORIENTATION: RIGHT

## 2022-03-08 NOTE — PROGRESS NOTES
Utilize [facility] alcohol withdrawal scale (Based on [place] Modified Alcohol Withdrawal Scale). Tabulate score based on classifications including Tremor, Sweating, Hallucination, Orientation, and Agitation. Tremor: 1  Sweatin  Hallucinations: 0  Orientation: 0  Agitation: 1  Total Score: 3  Action perform as described below     Tremor:  No tremor is 0 points. Tremor on movement is 1 point. Tremor at rest is 2 points. Sweating: No Sweat 0 points. Moist is 1 point. Drenching sweats is 2 points. Hallucinations: No present 0 points. Dissuadable is 1 point. Not dissuadable is 2 points. Orientation: Oriented 0 points. Vague/detached 1 point. Disoriented/no contact 2 points. Agitation: Calm 0 points. Anxious 1 point. Panicky 2 points. Check scale every 2 hours. Discontinue scoring with 4 consecutive scorings of 0. Scale 0: No phenobarbital given. Re-assess every 60 minutes as needed. Scale 1-3: Phenobarbital 130 mg IV over 3 minutes. Re-assess every 60 minutes as needed. May administer every 60 minutes to a maximum dose of phenobarbital 1040 mg in 24 hours! Scale 4-8: Phenobarbital 260 mg IV over 5 minutes. Re-assess every 60 minutes as needed. May administer every 60 minutes to a maximum dose of phenobarbital 1040mg in 24 hours! Scale 9-10: Transfer to ICU (if not already in ICU). Administer 10mg/kg phenobarbital IV over 60 minutes. Maximum dose phenobarbital is 1040mg in 24 hours!

## 2022-03-08 NOTE — LETTER
[facility] [address]  [address] [address]  Phone: [phone]             March 12, 2022    Patient: Cheri Farooq   YOB: 1975   Date of Visit: 3/8/2022       To Whom It May Concern:    Juancho Hung was seen and treated in our facility  beginning 3/8/2022 until 3/12/22. He may return to work on 03/15/22.       Sincerely,     Junior Melton RN   On behalf of Dr Sabas Vance        Signature:__________________________________

## 2022-03-08 NOTE — PLAN OF CARE
Brett North is a 55 y.o. male who presents to the emergency department as a direct admit for alcohol withdrawal.  The patient was accidentally initially registered in the emergency department and seen by myself prior to transfer to the hospital floor. CMP, EKG, troponin, CBC, and ethanol were ordered in the emergency department. After the patient was transferred to the hospital floor, this was discussed with ELISE Robles CNP, with the hospitalist service.     Signed:  Poncho Linares DO   PGY-2 Emergency Medicine  03/08/22 1:43 PM

## 2022-03-08 NOTE — H&P
History & Physical    Patient:  Benja Farooq  YOB: 1975  Date of Service: 3/8/2022  MRN: [id number]   Acct:  [de-identified]   Primary Care Physician: Waylon Guillory    Chief Complaint: Alcohol withdraw    History of Present Illness:   History obtained from the patient. The patient is a 55 y.o. male with a history of depression and anxiety that  presents today with complaints of alcohol withdraw. Patient is from home alone. Recently discharged on 2/11/2022 post phenobarbital treatment for alcohol withdrawal.  Patient reports he went approximately two weeks without a drink but became so depressed he started up again. He states he drinks 1-2 liters of rum per day. He has sought treatment today because his withdrawal symptoms continue to worsen. PAWWS score is 4. He states that he wants to get sober but is finding it difficult to not drink when he is discharged from hospital. Patient is tearful at this time. Patient reports headache and nausea but denies vomiting. Fine tremor noted. Past Medical History:        Diagnosis Date    Alcohol dependence ([facility] [address]     Arthritis     Hyperlipidemia     Hypertension     Liver disease     Panic attacks     Pneumonia     Psychiatric problem     major depressive disorder    Scoliosis        Past Surgical History:        Procedure Laterality Date    BACK SURGERY      x2    CARDIAC SURGERY      EKG 12-LEAD  4/14/2015            Home Medications:   No current facility-administered medications on file prior to encounter.      Current Outpatient Medications on File Prior to Encounter   Medication Sig Dispense Refill    folic acid (FOLVITE) 1 MG tablet Take 1 tablet by mouth daily 30 tablet 1    pantoprazole (PROTONIX) 40 MG tablet Take 1 tablet by mouth every morning (before breakfast) 30 tablet 1    thiamine 100 MG tablet Take 1 tablet by mouth daily 30 tablet 1    acetaminophen (TYLENOL) 500 MG tablet Take 1,000 mg by mouth every 6 hours as needed for Pain      ibuprofen (ADVIL;MOTRIN) 800 MG tablet Take 1 tablet by mouth every 8 hours as needed for Pain 30 tablet 0    escitalopram (LEXAPRO) 20 MG tablet Take 1.5 tablets by mouth daily 45 tablet 3    lisinopril (PRINIVIL;ZESTRIL) 10 MG tablet Take 1 tablet by mouth daily (Patient taking differently: Take 20 mg by mouth daily ) 30 tablet 3    metoprolol tartrate (LOPRESSOR) 25 MG tablet Take 1 tablet by mouth 2 times daily 60 tablet 0    Multiple Vitamin (MULTIVITAMIN) TABS tablet Take 1 tablet by mouth daily 90 tablet 3       Allergies:  Fentanyl    Social History:    reports that he has quit smoking. He has a 4.25 pack-year smoking history. He has never used smokeless tobacco. He reports current alcohol use. He reports that he does not use drugs. Family History:       Problem Relation Age of Onset    Kidney Disease Mother     Arthritis Father     Depression Father     Diabetes Father     Heart Disease Father     High Blood Pressure Father     High Cholesterol Father     Substance Abuse Father         alcohol/ pain medications    Depression Sister     Depression Brother     Diabetes Brother        Review of systems:  Constitutional: no fever, no night sweats, no fatigue  Head: positive for headache, no head injury, no migranes. Eye: no blurring of vision, no double vision. Ears: no hearing difficulty, no tinnitus  Mouth/throat: no ulceration, dental caries, dysphagia  Lungs: no cough, no shortness of breath, no wheeze  CVS: no palpitation, no chest pain, no shortness of breath  GI: no abdominal pain, positive for nausea , no vomiting, no constipation  SRAVANTHI: no dysuria, frequency and urgency, no hematuria, no kidney stones  Musculoskeletal: no joint pain, swelling , stiffness  Tremors noted.   Endocrine: no polyuria, polydypsia, no cold or heat intolerence  Hematology: no anemia, no easy brusing or bleeding, no hx of clotting disorder  Dermatology: no skin rash, no eczema, no prurities,  Psychiatry: Positive for anxiety and depression. Neurology: no syncope, no seizures, no numbness or tingling of hands, no numbness or tingling of feet, no paresis    10 point review of systems completed, all other than noted above are negative. Vitals: 98.9, 101, 147/94, 20, 94% room air  BMI: 31.57                 Exam:  Physical Examination: General appearance - oriented to person, place, and time and anxious, tearful.   Mental status - alert, oriented to person, place, and time  Neck - supple, no significant adenopathy, no JVD, or carotid bruits  Chest - clear to auscultation, no wheezes, rales or rhonchi, symmetric air entry  Heart - normal rate, regular rhythm, normal S1, S2, no murmurs, rubs, clicks or gallops  Abdomen - soft, nontender, nondistended, no masses or organomegaly  tenderness noted RUQ  Neurological - alert, oriented, normal speech, no focal findings or movement disorder noted  Musculoskeletal - no joint tenderness, deformity or swelling  Extremities - peripheral pulses normal, no pedal edema, no clubbing or cyanosis  Skin - normal coloration and turgor, no rashes, no suspicious skin lesions noted      Review of Labs and Diagnostic Testing:    Recent Results (from the past 24 hour(s))   CBC with Auto Differential    Collection Time: 03/08/22 10:45 AM   Result Value Ref Range    WBC 5.1 4.8 - 10.8 thou/mm3    RBC 4.43 (L) 4.70 - 6.10 mill/mm3    Hemoglobin 15.4 14.0 - 18.0 gm/dl    Hematocrit 45.1 42.0 - 52.0 %    .8 (H) 80.0 - 94.0 fL    MCH 34.8 (H) 26.0 - 33.0 pg    MCHC 34.1 32.2 - 35.5 gm/dl    RDW-CV 12.5 11.5 - 14.5 %    RDW-SD 46.9 (H) 35.0 - 45.0 fL    Platelets 991 553 - 181 thou/mm3    MPV 9.5 9.4 - 12.4 fL    Seg Neutrophils 53.5 %    Lymphocytes 32.0 %    Monocytes 10.4 %    Eosinophils 3.5 %    Basophils 0.4 %    Immature Granulocytes 0.2 %    Segs Absolute 2.7 1.8 - 7.7 thou/mm3    Lymphocytes Absolute 1.6 1.0 - 4.8 thou/mm3    Monocytes Absolute 0.5 0.4 - 1.3 thou/mm3    Eosinophils Absolute 0.2 0.0 - 0.4 thou/mm3    Basophils Absolute 0.0 0.0 - 0.1 thou/mm3    Immature Grans (Abs) 0.01 0.00 - 0.07 thou/mm3    nRBC 0 /100 wbc   Comprehensive Metabolic Panel w/ Reflex to MG    Collection Time: 03/08/22 10:45 AM   Result Value Ref Range    Glucose 132 (H) 70 - 108 mg/dL    CREATININE 1.0 0.4 - 1.2 mg/dL    BUN 15 7 - 22 mg/dL    Sodium 140 135 - 145 meq/L    Potassium reflex Magnesium 4.4 3.5 - 5.2 meq/L    Chloride 100 98 - 111 meq/L    CO2 25 23 - 33 meq/L    Calcium 9.5 8.5 - 10.5 mg/dL    AST 60 (H) 5 - 40 U/L    Alkaline Phosphatase 89 38 - 126 U/L    Total Protein 7.8 6.1 - 8.0 g/dL    Albumin 4.6 3.5 - 5.1 g/dL    Total Bilirubin 0.5 0.3 - 1.2 mg/dL    ALT 50 11 - 66 U/L   Troponin    Collection Time: 03/08/22 10:45 AM   Result Value Ref Range    Troponin T < 0.010 ng/ml   Lipase    Collection Time: 03/08/22 10:45 AM   Result Value Ref Range    Lipase 55.7 (H) 5.6 - 51.3 U/L   Lactic Acid    Collection Time: 03/08/22 10:45 AM   Result Value Ref Range    Lactic Acid 1.7 0.5 - 2.0 mmol/L   Ethanol    Collection Time: 03/08/22 10:45 AM   Result Value Ref Range    ETHYL ALCOHOL, SERUM < 0.01 0.00 %   Anion Gap    Collection Time: 03/08/22 10:45 AM   Result Value Ref Range    Anion Gap 15.0 8.0 - 16.0 meq/L   Glomerular Filtration Rate, Estimated    Collection Time: 03/08/22 10:45 AM   Result Value Ref Range    Est, Glom Filt Rate 80 (A) ml/min/1.73m2   Osmolality    Collection Time: 03/08/22 10:45 AM   Result Value Ref Range    Osmolality Calc 282.1 275.0 - 300.0 mOsmol/kg       Radiology:     No results found. EKG: No EKG on file    Assessment:    Principal Problem:    Alcohol dependence with withdrawal ([address]  Active Problems:    Alcohol abuse with withdrawal ([facility]  Resolved Problems:    * No resolved hospital problems. *      Assessment/Plan:    1. Chronic alcohol abuse with acute withdrawal: Patient drinks 1-2 liters of rum per day.  Reports last drink 0230 3/8/2022. ETOH negative. Recently completed 3 day course of phenobarb prophylactic protocol last month. Will again initiate. Addiction service consult for OP resources. Patient needs coping skills. Continue PO folic acid and thiamine. Seizure / fall precautions. Monitor for worsening symptoms. 2. Tachycardia r/t #1: Continue home BB. Monitor. On Tele. Obtain EKG,.   3. Essential HTN: Continue home Lisinopril. 4. Depression and anxiety: Continue home Lexapro.       DVT prophylaxis: [x] Lovenox                                 [] SCDs                                 [] SQ Heparin                                 [] Encourage ambulation, low risk for DVT, no chemical or mechanical prophylaxis necessary              [] Already on Anticoagulation                Anticipated Disposition upon discharge: [x] Home                                                                         [] Home with Home Health                                                                         [] [facility]                                                                         [] [address]          Electronically signed by Marcelo Parra on 3/8/2022 at 12:22 PM

## 2022-03-08 NOTE — ED NOTES
Called 4A to notify pt was coming up, pt transported by wheelchair, stable.       Lili Rodríguez  03/08/22 1040

## 2022-03-09 LAB
ALBUMIN SERPL-MCNC: 3.9 G/DL (ref 3.5–5.1)
ALP BLD-CCNC: 83 U/L (ref 38–126)
ALT SERPL-CCNC: 42 U/L (ref 11–66)
ANION GAP SERPL CALCULATED.3IONS-SCNC: 12 MEQ/L (ref 8–16)
AST SERPL-CCNC: 40 U/L (ref 5–40)
BASOPHILS # BLD: 0.5 %
BASOPHILS ABSOLUTE: 0 THOU/MM3 (ref 0–0.1)
BILIRUB SERPL-MCNC: 0.6 MG/DL (ref 0.3–1.2)
BILIRUBIN DIRECT: < 0.2 MG/DL (ref 0–0.3)
BUN BLDV-MCNC: 14 MG/DL (ref 7–22)
CALCIUM SERPL-MCNC: 9.1 MG/DL (ref 8.5–10.5)
CHLORIDE BLD-SCNC: 101 MEQ/L (ref 98–111)
CO2: 26 MEQ/L (ref 23–33)
CREAT SERPL-MCNC: 1 MG/DL (ref 0.4–1.2)
EKG ATRIAL RATE: 81 BPM
EKG P AXIS: 4 DEGREES
EKG P-R INTERVAL: 126 MS
EKG Q-T INTERVAL: 382 MS
EKG QRS DURATION: 86 MS
EKG QTC CALCULATION (BAZETT): 443 MS
EKG R AXIS: 60 DEGREES
EKG T AXIS: 32 DEGREES
EKG VENTRICULAR RATE: 81 BPM
EOSINOPHIL # BLD: 3.8 %
EOSINOPHILS ABSOLUTE: 0.2 THOU/MM3 (ref 0–0.4)
ERYTHROCYTE [DISTWIDTH] IN BLOOD BY AUTOMATED COUNT: 12.6 % (ref 11.5–14.5)
ERYTHROCYTE [DISTWIDTH] IN BLOOD BY AUTOMATED COUNT: 48.1 FL (ref 35–45)
GFR SERPL CREATININE-BSD FRML MDRD: 80 ML/MIN/1.73M2
GLUCOSE BLD-MCNC: 98 MG/DL (ref 70–108)
HCT VFR BLD CALC: 42.7 % (ref 42–52)
HEMOGLOBIN: 14.6 GM/DL (ref 14–18)
IMMATURE GRANS (ABS): 0.01 THOU/MM3 (ref 0–0.07)
IMMATURE GRANULOCYTES: 0.2 %
LYMPHOCYTES # BLD: 32.9 %
LYMPHOCYTES ABSOLUTE: 1.9 THOU/MM3 (ref 1–4.8)
MCH RBC QN AUTO: 35.4 PG (ref 26–33)
MCHC RBC AUTO-ENTMCNC: 34.2 GM/DL (ref 32.2–35.5)
MCV RBC AUTO: 103.4 FL (ref 80–94)
MONOCYTES # BLD: 10.2 %
MONOCYTES ABSOLUTE: 0.6 THOU/MM3 (ref 0.4–1.3)
NUCLEATED RED BLOOD CELLS: 0 /100 WBC
PLATELET # BLD: 230 THOU/MM3 (ref 130–400)
PMV BLD AUTO: 9.8 FL (ref 9.4–12.4)
POTASSIUM REFLEX MAGNESIUM: 3.8 MEQ/L (ref 3.5–5.2)
RBC # BLD: 4.13 MILL/MM3 (ref 4.7–6.1)
SEG NEUTROPHILS: 52.4 %
SEGMENTED NEUTROPHILS ABSOLUTE COUNT: 3 THOU/MM3 (ref 1.8–7.7)
SODIUM BLD-SCNC: 139 MEQ/L (ref 135–145)
TOTAL PROTEIN: 6.8 G/DL (ref 6.1–8)
WBC # BLD: 5.8 THOU/MM3 (ref 4.8–10.8)

## 2022-03-09 PROCEDURE — 93010 ELECTROCARDIOGRAM REPORT: CPT | Performed by: NUCLEAR MEDICINE

## 2022-03-09 PROCEDURE — 80048 BASIC METABOLIC PNL TOTAL CA: CPT

## 2022-03-09 PROCEDURE — 6370000000 HC RX 637 (ALT 250 FOR IP): Performed by: NURSE PRACTITIONER

## 2022-03-09 PROCEDURE — 99232 SBSQ HOSP IP/OBS MODERATE 35: CPT | Performed by: HOSPITALIST

## 2022-03-09 PROCEDURE — 6360000002 HC RX W HCPCS: Performed by: PHYSICIAN ASSISTANT

## 2022-03-09 PROCEDURE — 85025 COMPLETE CBC W/AUTO DIFF WBC: CPT

## 2022-03-09 PROCEDURE — 93005 ELECTROCARDIOGRAM TRACING: CPT | Performed by: HOSPITALIST

## 2022-03-09 PROCEDURE — 6360000002 HC RX W HCPCS: Performed by: HOSPITALIST

## 2022-03-09 PROCEDURE — 2060000000 HC ICU INTERMEDIATE R&B

## 2022-03-09 PROCEDURE — 36415 COLL VENOUS BLD VENIPUNCTURE: CPT

## 2022-03-09 PROCEDURE — 94760 N-INVAS EAR/PLS OXIMETRY 1: CPT

## 2022-03-09 PROCEDURE — 6360000002 HC RX W HCPCS: Performed by: NURSE PRACTITIONER

## 2022-03-09 PROCEDURE — 80076 HEPATIC FUNCTION PANEL: CPT

## 2022-03-09 PROCEDURE — 2580000003 HC RX 258: Performed by: NURSE PRACTITIONER

## 2022-03-09 RX ORDER — LORAZEPAM 2 MG/ML
1 INJECTION INTRAMUSCULAR ONCE
Status: COMPLETED | OUTPATIENT
Start: 2022-03-09 | End: 2022-03-09

## 2022-03-09 RX ADMIN — LISINOPRIL 20 MG: 20 TABLET ORAL at 08:26

## 2022-03-09 RX ADMIN — METOPROLOL TARTRATE 25 MG: 25 TABLET, FILM COATED ORAL at 08:26

## 2022-03-09 RX ADMIN — FOLIC ACID 1 MG: 1 TABLET ORAL at 08:27

## 2022-03-09 RX ADMIN — PHENOBARBITAL 32.4 MG: 32.4 TABLET ORAL at 21:49

## 2022-03-09 RX ADMIN — ENOXAPARIN SODIUM 40 MG: 100 INJECTION SUBCUTANEOUS at 08:25

## 2022-03-09 RX ADMIN — LORAZEPAM 1 MG: 2 INJECTION INTRAMUSCULAR; INTRAVENOUS at 19:53

## 2022-03-09 RX ADMIN — PHENOBARBITAL 64.8 MG: 32.4 TABLET ORAL at 08:25

## 2022-03-09 RX ADMIN — METOPROLOL TARTRATE 25 MG: 25 TABLET, FILM COATED ORAL at 21:49

## 2022-03-09 RX ADMIN — LORAZEPAM 1 MG: 2 INJECTION INTRAMUSCULAR; INTRAVENOUS at 18:21

## 2022-03-09 RX ADMIN — ESCITALOPRAM OXALATE 30 MG: 20 TABLET ORAL at 08:27

## 2022-03-09 RX ADMIN — Medication 100 MG: at 08:28

## 2022-03-09 RX ADMIN — SODIUM CHLORIDE, PRESERVATIVE FREE 10 ML: 5 INJECTION INTRAVENOUS at 08:33

## 2022-03-09 RX ADMIN — Medication 1 TABLET: at 08:26

## 2022-03-09 RX ADMIN — SODIUM CHLORIDE, PRESERVATIVE FREE 10 ML: 5 INJECTION INTRAVENOUS at 21:49

## 2022-03-09 RX ADMIN — PANTOPRAZOLE SODIUM 40 MG: 40 TABLET, DELAYED RELEASE ORAL at 05:55

## 2022-03-09 ASSESSMENT — PAIN SCALES - GENERAL
PAINLEVEL_OUTOF10: 0
PAINLEVEL_OUTOF10: 3
PAINLEVEL_OUTOF10: 0

## 2022-03-09 ASSESSMENT — PAIN DESCRIPTION - PAIN TYPE: TYPE: CHRONIC PAIN

## 2022-03-09 ASSESSMENT — PAIN DESCRIPTION - LOCATION: LOCATION: BACK

## 2022-03-09 NOTE — PLAN OF CARE
Problem: Falls - Risk of:  Goal: Will remain free from falls  Description: Will remain free from falls  Outcome: Met This Shift  Note: Free from falls this shift, at this time. Call light and bedside table within reach. Bed alarm on. Bed wheels locked and bed in lowest position. Pt oriented to own abilities and is encouraged to use call light for needs. Problem: Falls - Risk of:  Goal: Absence of physical injury  Description: Absence of physical injury  Outcome: Met This Shift  Note: Free from physical injury this shift, at this time. Problem: Pain:  Goal: Pain level will decrease  Description: Pain level will decrease  Outcome: Met This Shift  Note: Denies pain at this time. Able to use 0-10 pain rating scale. Pain goal is no pain. Problem: Pain:  Goal: Control of acute pain  Description: Control of acute pain  Outcome: Met This Shift  Note: Denies pain at this time. Able to use 0-10 pain rating scale. Pain goal is no pain. Problem: Pain:  Goal: Control of chronic pain  Description: Control of chronic pain  Outcome: Met This Shift  Note: Denies chronic pain. Problem: Discharge Planning:  Goal: Discharged to appropriate level of care  Description: Discharged to appropriate level of care  Outcome: Ongoing  Note: To be discharged home alone. No orders for d/c at this time. Problem: Sleep Pattern Disturbance:  Goal: Appears well-rested  Description: Appears well-rested  Outcome: Ongoing    Care plan reviewed with patient. Patient verbalizes understanding of the plan of care and contributed to goal setting.

## 2022-03-09 NOTE — CARE COORDINATION
3/9/22, 4:23 PM EST    DISCHARGE PLANNING EVALUATION    Deferred \"for consideration of rehab\" consult at this time. Consult was placed for addictions services. He denied social work needs to [facility].

## 2022-03-09 NOTE — CARE COORDINATION
03/09/22 1216   Readmission Assessment   Number of Days since last admission? 8-30 days   Previous Disposition Home Alone   Who is being Interviewed Patient   What was the patient's/caregiver's perception as to why they think they needed to return back to the hospital? Other (Comment)  (began drinking again)   Did you visit your Primary Care Physician after you left the hospital, before you returned this time? Yes   Did you see a specialist, such as Cardiac, Pulmonary, Orthopedic Physician, etc. after you left the hospital? No   Who advised the patient to return to the hospital? Self-referral   Does the patient report anything that got in the way of taking their medications? No   In our efforts to provide the best possible care to you and others like you, can you think of anything that we could have done to help you after you left the hospital the first time, so that you might not have needed to return so soon?  Other (Comment)

## 2022-03-09 NOTE — CARE COORDINATION
3/9/22, 8:00 AM EST  DISCHARGE PLANNING EVALUATION:    Joselin Farooq       Admitted: 3/8/2022/ [place] 90 day: 1   Location: [facility]03/003-A Reason for admit: Alcohol dependence with withdrawal ([facility] [address] [F10.239]  Alcohol abuse with withdrawal ([facility] [address] [F10.139]   PMH:  has a past medical history of Alcohol dependence ([facility] [address] Arthritis, GERD (gastroesophageal reflux disease), Hyperlipidemia, Hypertension, Liver disease, Panic attacks, Pneumonia, Psychiatric problem, and Scoliosis. Procedure: none  Barriers to Discharge:  From ED. Lovenox, Protonix, Phenobarbital protocol for ETOH withdrawal.   PCP: Alexi Joya  Readmission Risk Score: 15.4 ( )%    Patient Goals/Plan/Treatment Preferences: Met with Bebeto Matthews. He currently lives at home alone. Plan is to return home at discharge. He denies need for DME and declines HH. Will follow. Transportation/Food Security/Housekeeping Addressed:  No issues identified.

## 2022-03-09 NOTE — PROGRESS NOTES
Watch for seizure activity including onset, duration and type  Provide quiet environment  Assess for physical and or emotional stress, provide support. Provide seizure precautions:  Pad side rails, keep environment clear of clutter, suction at the bedside, observe for breathing difficulty and aspiration. Educate patient and family about seizures and the importance of safety. Educate on medications and the importance of compliance and follow up. Discharge instructions: No driving, swimming, heights or heavy lifting till seizure free for 6 months.

## 2022-03-09 NOTE — PROGRESS NOTES
Hospitalist Progress Note    Patient:  Berenice Flemingmicis      Unit/Bed:4A-03/003-A    YOB: 1975    MRN: [id number]       Acct: [de-identified]     PCP: Dick Boone    Date of Admission: 3/8/2022    Assessment/Plan:    1. Chronic alcohol abuse: Patient admitted for acute alcohol withdrawal on phenobarbital protocol. Continue with folic acid and thiamine. Seizure precautions in place. 2. Hypertension: Continue with home lisinopril, metoprolol and monitor blood pressure. 3. Depression anxiety: Continue with home dose of Lexapro. 4. VTE prophylaxis: Lovenox      Subjective (past 24 hours):   Patient seen and examined at bedside states he is feeling slightly better today. Overnight patient did require phenobarbital for withdrawal symptoms. Medications:  Reviewed    Infusion Medications    sodium chloride       Scheduled Medications    escitalopram  30 mg Oral Daily    folic acid  1 mg Oral Daily    lisinopril  20 mg Oral Daily    metoprolol tartrate  25 mg Oral BID    multivitamin  1 tablet Oral Daily    pantoprazole  40 mg Oral QAM AC    thiamine  100 mg Oral Daily    sodium chloride flush  5-40 mL IntraVENous 2 times per day    enoxaparin  40 mg SubCUTAneous Daily    PHENobarbital  32.4 mg Oral BID     PRN Meds: sodium chloride flush, sodium chloride, ondansetron **OR** ondansetron, polyethylene glycol, acetaminophen **OR** acetaminophen      Intake/Output Summary (Last 24 hours) at 3/9/2022 1331  Last data filed at 3/9/2022 0327  Gross per 24 hour   Intake 240 ml   Output 350 ml   Net -110 ml       Diet:  ADULT DIET; Regular    Exam:  /66   Pulse 76   Temp 97.6 °F (36.4 °C) (Oral)   Resp 16   Wt 220 lb 8 oz (100 kg)   SpO2 98%   BMI 31.64 kg/m²     General appearance: No apparent distress, appears stated age and cooperative. Respiratory:  Normal respiratory effort. Clear to auscultation, bilaterally without Rales/Wheezes/Rhonchi.   Cardiovascular: Regular rate and

## 2022-03-09 NOTE — PROGRESS NOTES
0969- In to assess patient. Vitals obtained. Patient complaining of chest pain. Stat EKG obtained. EKG normal. Will continue to monitor.

## 2022-03-09 NOTE — PROGRESS NOTES
Pt admitted as a direct admit from home, vital signs taken, telemetry on and provider notified. Pt reports last drink at 0230 and states that he is drinking 1L rum per day and currently does not have any signs of withdrawal .  Pt affect flat with minimal eye contact and monotone speech is treated for depression, anxiety and currently denies thoughts of harming self . He states he was inpatient in February but was unable to remain sober upon discharge.

## 2022-03-10 LAB
ALBUMIN SERPL-MCNC: 3.9 G/DL (ref 3.5–5.1)
ALP BLD-CCNC: 81 U/L (ref 38–126)
ALT SERPL-CCNC: 48 U/L (ref 11–66)
ANION GAP SERPL CALCULATED.3IONS-SCNC: 10 MEQ/L (ref 8–16)
AST SERPL-CCNC: 52 U/L (ref 5–40)
BASOPHILS # BLD: 0.5 %
BASOPHILS ABSOLUTE: 0 THOU/MM3 (ref 0–0.1)
BILIRUB SERPL-MCNC: 0.4 MG/DL (ref 0.3–1.2)
BUN BLDV-MCNC: 12 MG/DL (ref 7–22)
CALCIUM SERPL-MCNC: 8.8 MG/DL (ref 8.5–10.5)
CHLORIDE BLD-SCNC: 98 MEQ/L (ref 98–111)
CO2: 26 MEQ/L (ref 23–33)
CREAT SERPL-MCNC: 0.8 MG/DL (ref 0.4–1.2)
EOSINOPHIL # BLD: 4.1 %
EOSINOPHILS ABSOLUTE: 0.2 THOU/MM3 (ref 0–0.4)
ERYTHROCYTE [DISTWIDTH] IN BLOOD BY AUTOMATED COUNT: 12.3 % (ref 11.5–14.5)
ERYTHROCYTE [DISTWIDTH] IN BLOOD BY AUTOMATED COUNT: 46.4 FL (ref 35–45)
GFR SERPL CREATININE-BSD FRML MDRD: > 90 ML/MIN/1.73M2
GLUCOSE BLD-MCNC: 133 MG/DL (ref 70–108)
HCT VFR BLD CALC: 42.9 % (ref 42–52)
HEMOGLOBIN: 14.6 GM/DL (ref 14–18)
IMMATURE GRANS (ABS): 0.01 THOU/MM3 (ref 0–0.07)
IMMATURE GRANULOCYTES: 0.2 %
LYMPHOCYTES # BLD: 26.7 %
LYMPHOCYTES ABSOLUTE: 1.5 THOU/MM3 (ref 1–4.8)
MCH RBC QN AUTO: 34.9 PG (ref 26–33)
MCHC RBC AUTO-ENTMCNC: 34 GM/DL (ref 32.2–35.5)
MCV RBC AUTO: 102.6 FL (ref 80–94)
MONOCYTES # BLD: 9.7 %
MONOCYTES ABSOLUTE: 0.5 THOU/MM3 (ref 0.4–1.3)
NUCLEATED RED BLOOD CELLS: 0 /100 WBC
PLATELET # BLD: 233 THOU/MM3 (ref 130–400)
PMV BLD AUTO: 10.1 FL (ref 9.4–12.4)
POTASSIUM SERPL-SCNC: 3.8 MEQ/L (ref 3.5–5.2)
RBC # BLD: 4.18 MILL/MM3 (ref 4.7–6.1)
SEG NEUTROPHILS: 58.8 %
SEGMENTED NEUTROPHILS ABSOLUTE COUNT: 3.3 THOU/MM3 (ref 1.8–7.7)
SODIUM BLD-SCNC: 134 MEQ/L (ref 135–145)
TOTAL PROTEIN: 6.9 G/DL (ref 6.1–8)
WBC # BLD: 5.6 THOU/MM3 (ref 4.8–10.8)

## 2022-03-10 PROCEDURE — 36415 COLL VENOUS BLD VENIPUNCTURE: CPT

## 2022-03-10 PROCEDURE — 6370000000 HC RX 637 (ALT 250 FOR IP): Performed by: NURSE PRACTITIONER

## 2022-03-10 PROCEDURE — 6360000002 HC RX W HCPCS: Performed by: PHYSICIAN ASSISTANT

## 2022-03-10 PROCEDURE — 2580000003 HC RX 258: Performed by: NURSE PRACTITIONER

## 2022-03-10 PROCEDURE — 85025 COMPLETE CBC W/AUTO DIFF WBC: CPT

## 2022-03-10 PROCEDURE — 80053 COMPREHEN METABOLIC PANEL: CPT

## 2022-03-10 PROCEDURE — 6370000000 HC RX 637 (ALT 250 FOR IP): Performed by: HOSPITALIST

## 2022-03-10 PROCEDURE — 99232 SBSQ HOSP IP/OBS MODERATE 35: CPT | Performed by: HOSPITALIST

## 2022-03-10 PROCEDURE — 2060000000 HC ICU INTERMEDIATE R&B

## 2022-03-10 PROCEDURE — 6360000002 HC RX W HCPCS: Performed by: NURSE PRACTITIONER

## 2022-03-10 RX ORDER — LORAZEPAM 2 MG/ML
2 INJECTION INTRAMUSCULAR ONCE
Status: COMPLETED | OUTPATIENT
Start: 2022-03-10 | End: 2022-03-10

## 2022-03-10 RX ORDER — CHLORDIAZEPOXIDE HYDROCHLORIDE 25 MG/1
25 CAPSULE, GELATIN COATED ORAL 3 TIMES DAILY
Status: COMPLETED | OUTPATIENT
Start: 2022-03-10 | End: 2022-03-11

## 2022-03-10 RX ADMIN — SODIUM CHLORIDE, PRESERVATIVE FREE 10 ML: 5 INJECTION INTRAVENOUS at 20:54

## 2022-03-10 RX ADMIN — Medication 1 TABLET: at 08:12

## 2022-03-10 RX ADMIN — CHLORDIAZEPOXIDE HYDROCHLORIDE 25 MG: 25 CAPSULE ORAL at 11:00

## 2022-03-10 RX ADMIN — SODIUM CHLORIDE, PRESERVATIVE FREE 10 ML: 5 INJECTION INTRAVENOUS at 08:51

## 2022-03-10 RX ADMIN — Medication 100 MG: at 08:12

## 2022-03-10 RX ADMIN — LORAZEPAM 2 MG: 2 INJECTION INTRAMUSCULAR; INTRAVENOUS at 06:47

## 2022-03-10 RX ADMIN — PHENOBARBITAL 32.4 MG: 32.4 TABLET ORAL at 20:53

## 2022-03-10 RX ADMIN — CHLORDIAZEPOXIDE HYDROCHLORIDE 25 MG: 25 CAPSULE ORAL at 16:32

## 2022-03-10 RX ADMIN — ONDANSETRON 4 MG: 2 INJECTION INTRAMUSCULAR; INTRAVENOUS at 20:59

## 2022-03-10 RX ADMIN — PHENOBARBITAL 32.4 MG: 32.4 TABLET ORAL at 08:11

## 2022-03-10 RX ADMIN — METOPROLOL TARTRATE 25 MG: 25 TABLET, FILM COATED ORAL at 08:11

## 2022-03-10 RX ADMIN — FOLIC ACID 1 MG: 1 TABLET ORAL at 08:12

## 2022-03-10 RX ADMIN — METOPROLOL TARTRATE 25 MG: 25 TABLET, FILM COATED ORAL at 20:54

## 2022-03-10 RX ADMIN — ESCITALOPRAM OXALATE 30 MG: 20 TABLET ORAL at 08:11

## 2022-03-10 RX ADMIN — CHLORDIAZEPOXIDE HYDROCHLORIDE 25 MG: 25 CAPSULE ORAL at 20:53

## 2022-03-10 RX ADMIN — ENOXAPARIN SODIUM 40 MG: 100 INJECTION SUBCUTANEOUS at 08:11

## 2022-03-10 RX ADMIN — PANTOPRAZOLE SODIUM 40 MG: 40 TABLET, DELAYED RELEASE ORAL at 06:05

## 2022-03-10 ASSESSMENT — PAIN SCALES - GENERAL
PAINLEVEL_OUTOF10: 0

## 2022-03-10 ASSESSMENT — PATIENT HEALTH QUESTIONNAIRE - PHQ9: SUM OF ALL RESPONSES TO PHQ QUESTIONS 1-9: 23

## 2022-03-10 NOTE — CONSULTS
Brief Intervention and Referral to Treatment Summary    Patient was provided PHQ-9, AUDIT and DAST Screening:      PHQ-9 Score: 23  AUDIT Score:  32  DAST Score:  0    Patients substance use is considered     Dependent    Patients depression is considered:     Severe    Brief Education Was Provided    Patient was receptive      Brief Intervention Is Provided (Only for AUDIT or DAST)     Patient reports readiness to decrease and/or stop use and a plan was discussed     Recommendations/Referrals for Brief and/or Specialized Treatment Provided to Patient     Patient provided AOD packet. Discussed plan to follow up with Gaby Goncalves.

## 2022-03-10 NOTE — PROGRESS NOTES
Hospitalist Progress Note    Patient:  Faiza Parksis      Unit/Bed:4A-03/003-A    YOB: 1975    MRN: [id number]       Acct: [de-identified]     PCP: Nimo Meléndez    Date of Admission: 3/8/2022    Assessment/Plan:    1. Chronic alcohol abuse: Patient admitted for acute alcohol withdrawal on phenobarbital protocol. Continue with folic acid and thiamine. Seizure precautions in place. Required ativan and phenobarbital this morning, will add scheduled Librium with planned quick taper. 2. Hypertension: Continue with home lisinopril, metoprolol and monitor blood pressure. 3. Depression anxiety: Continue with home dose of Lexapro. 4. VTE prophylaxis: Lovenox      Subjective (past 24 hours):   Patient seen and examined at bedside feels better now but reports more symptoms earlier this morning. Medications:  Reviewed    Infusion Medications    sodium chloride       Scheduled Medications    chlordiazePOXIDE  25 mg Oral TID    escitalopram  30 mg Oral Daily    folic acid  1 mg Oral Daily    lisinopril  20 mg Oral Daily    metoprolol tartrate  25 mg Oral BID    multivitamin  1 tablet Oral Daily    pantoprazole  40 mg Oral QAM AC    thiamine  100 mg Oral Daily    sodium chloride flush  5-40 mL IntraVENous 2 times per day    enoxaparin  40 mg SubCUTAneous Daily    PHENobarbital  32.4 mg Oral BID     PRN Meds: sodium chloride flush, sodium chloride, ondansetron **OR** ondansetron, polyethylene glycol, acetaminophen **OR** acetaminophen      Intake/Output Summary (Last 24 hours) at 3/10/2022 1200  Last data filed at 3/10/2022 7264  Gross per 24 hour   Intake 250 ml   Output 900 ml   Net -650 ml       Diet:  ADULT DIET; Regular    Exam:  /72   Pulse 73   Temp 97.4 °F (36.3 °C) (Oral)   Resp 16   Wt 220 lb 0.3 oz (99.8 kg)   SpO2 97%   BMI 31.57 kg/m²     General appearance: No apparent distress, appears stated age and cooperative. Respiratory:  Normal respiratory effort.  Clear to auscultation, bilaterally without Rales/Wheezes/Rhonchi. Cardiovascular: Regular rate and rhythm with normal S1/S2 without murmurs, rubs or gallops. Abdomen: Soft, non-tender, non-distended with normal bowel sounds. Extremities: no pedal edema    Labs:   Recent Labs     03/08/22  1045 03/09/22  0320 03/10/22  0900   WBC 5.1 5.8 5.6   HGB 15.4 14.6 14.6   HCT 45.1 42.7 42.9    230 233     Recent Labs     03/08/22  1045 03/09/22  0320 03/10/22  0900    139 134*   K 4.4 3.8 3.8    101 98   CO2 25 26 26   BUN 15 14 12   CREATININE 1.0 1.0 0.8   CALCIUM 9.5 9.1 8.8     Recent Labs     03/08/22  1045 03/09/22  0320 03/10/22  0900   AST 60* 40 52*   ALT 50 42 48   BILIDIR  --  <0.2  --    BILITOT 0.5 0.6 0.4   ALKPHOS 89 83 81     No results for input(s): INR in the last 72 hours. No results for input(s): Kennyth Hammers in the last 72 hours. No results for input(s): PROCAL in the last 72 hours. Microbiology:      Urinalysis:      Lab Results   Component Value Date    NITRU NEGATIVE 03/05/2019    WBCUA 0-2 03/05/2019    BACTERIA NONE 03/05/2019    RBCUA 0-2 03/05/2019    BLOODU NEGATIVE 03/05/2019    SPECGRAV 1.028 05/18/2014    GLUCOSEU NEGATIVE 03/05/2019       Radiology:  No results found.     DVT prophylaxis: [x] Lovenox                                 [] SCDs                                 [] SQ Heparin                                 [] Encourage ambulation           [] Already on Anticoagulation     Code Status: Full Code    Tele:   [x] yes             [] no    Active Hospital Problems    Diagnosis Date Noted    Alcohol dependence with withdrawal ([facility] [address] [F10.239] 03/08/2022    Alcohol abuse with withdrawal ([facility] [address] [F10.139] 03/08/2022       Electronically signed by Ann Marie Child MD on 3/10/2022 at 12:00 PM

## 2022-03-10 NOTE — CARE COORDINATION
Discharge planning update:    Regular diet, seizure precautions. Librium,Lovenox, Lexapro, Phenobarbital protocol for ETOH withdrawal.   Plan is to return home. He denies needs.    Electronically signed by Heidi Duff RN on 3/10/2022 at 8:52 AM

## 2022-03-10 NOTE — FLOWSHEET NOTE
[place] [id number] PROGRESS NOTE      Patient: Cheri Alfaro [facility]  Room #: 4A-03/003-A            YOB: 1975  Age: 55 y.o. Gender: male            Admit Date & Time: 3/8/2022 11:31 AM    Assessment:  Conversation with Charissa Spence who is a 55year old male who is in bed on 4A, about his life and future plans. He stated he did not find AA helpful but he does know that he needs to stop drinking. [facility] was mentioned as an optiongfor him to find support. Interventions:  Prayer is offered and accepted for God's help and strength. Outcomes:  encouraged    Plan:    1. He is hoping to be discharged soon. 2.  Care Plan:  Continue spiritual and emotional care for patient and family. Including prayers.      Electronically signed by Nick Peterson on 3/10/2022 at 5:31 PM.  Sebas Bolaños  [phone]

## 2022-03-10 NOTE — PLAN OF CARE
Problem: Falls - Risk of:  Goal: Will remain free from falls  Description: Will remain free from falls  Outcome: Met This Shift  Goal: Absence of physical injury  Description: Absence of physical injury  Outcome: Met This Shift     Problem: Pain:  Goal: Pain level will decrease  Description: Pain level will decrease  Outcome: Met This Shift  Goal: Control of acute pain  Description: Control of acute pain  Outcome: Met This Shift  Goal: Control of chronic pain  Description: Control of chronic pain  Outcome: Met This Shift     Problem: Discharge Planning:  Goal: Discharged to appropriate level of care  Description: Discharged to appropriate level of care  Outcome: Ongoing     Problem: Sleep Pattern Disturbance:  Goal: Appears well-rested  Description: Appears well-rested  Outcome: Met This Shift

## 2022-03-11 LAB
ALBUMIN SERPL-MCNC: 4.1 G/DL (ref 3.5–5.1)
ALP BLD-CCNC: 86 U/L (ref 38–126)
ALT SERPL-CCNC: 53 U/L (ref 11–66)
ANION GAP SERPL CALCULATED.3IONS-SCNC: 12 MEQ/L (ref 8–16)
AST SERPL-CCNC: 49 U/L (ref 5–40)
BASOPHILS # BLD: 0.6 %
BASOPHILS ABSOLUTE: 0 THOU/MM3 (ref 0–0.1)
BILIRUB SERPL-MCNC: 0.3 MG/DL (ref 0.3–1.2)
BUN BLDV-MCNC: 11 MG/DL (ref 7–22)
CALCIUM SERPL-MCNC: 8.9 MG/DL (ref 8.5–10.5)
CHLORIDE BLD-SCNC: 100 MEQ/L (ref 98–111)
CO2: 25 MEQ/L (ref 23–33)
CREAT SERPL-MCNC: 1 MG/DL (ref 0.4–1.2)
EOSINOPHIL # BLD: 3.7 %
EOSINOPHILS ABSOLUTE: 0.2 THOU/MM3 (ref 0–0.4)
ERYTHROCYTE [DISTWIDTH] IN BLOOD BY AUTOMATED COUNT: 12.4 % (ref 11.5–14.5)
ERYTHROCYTE [DISTWIDTH] IN BLOOD BY AUTOMATED COUNT: 46.9 FL (ref 35–45)
GFR SERPL CREATININE-BSD FRML MDRD: 80 ML/MIN/1.73M2
GLUCOSE BLD-MCNC: 92 MG/DL (ref 70–108)
HCT VFR BLD CALC: 47.1 % (ref 42–52)
HEMOGLOBIN: 15.9 GM/DL (ref 14–18)
IMMATURE GRANS (ABS): 0.03 THOU/MM3 (ref 0–0.07)
IMMATURE GRANULOCYTES: 0.4 %
LYMPHOCYTES # BLD: 30.6 %
LYMPHOCYTES ABSOLUTE: 2.1 THOU/MM3 (ref 1–4.8)
MCH RBC QN AUTO: 34.9 PG (ref 26–33)
MCHC RBC AUTO-ENTMCNC: 33.8 GM/DL (ref 32.2–35.5)
MCV RBC AUTO: 103.5 FL (ref 80–94)
MONOCYTES # BLD: 9.3 %
MONOCYTES ABSOLUTE: 0.6 THOU/MM3 (ref 0.4–1.3)
NUCLEATED RED BLOOD CELLS: 0 /100 WBC
PLATELET # BLD: 242 THOU/MM3 (ref 130–400)
PMV BLD AUTO: 10.2 FL (ref 9.4–12.4)
POTASSIUM SERPL-SCNC: 4.1 MEQ/L (ref 3.5–5.2)
RBC # BLD: 4.55 MILL/MM3 (ref 4.7–6.1)
SEG NEUTROPHILS: 55.4 %
SEGMENTED NEUTROPHILS ABSOLUTE COUNT: 3.7 THOU/MM3 (ref 1.8–7.7)
SODIUM BLD-SCNC: 137 MEQ/L (ref 135–145)
TOTAL PROTEIN: 7.1 G/DL (ref 6.1–8)
WBC # BLD: 6.7 THOU/MM3 (ref 4.8–10.8)

## 2022-03-11 PROCEDURE — 6360000002 HC RX W HCPCS: Performed by: PHYSICIAN ASSISTANT

## 2022-03-11 PROCEDURE — 36415 COLL VENOUS BLD VENIPUNCTURE: CPT

## 2022-03-11 PROCEDURE — 94760 N-INVAS EAR/PLS OXIMETRY 1: CPT

## 2022-03-11 PROCEDURE — 85025 COMPLETE CBC W/AUTO DIFF WBC: CPT

## 2022-03-11 PROCEDURE — 2580000003 HC RX 258: Performed by: NURSE PRACTITIONER

## 2022-03-11 PROCEDURE — 2060000000 HC ICU INTERMEDIATE R&B

## 2022-03-11 PROCEDURE — 6360000002 HC RX W HCPCS: Performed by: NURSE PRACTITIONER

## 2022-03-11 PROCEDURE — 80053 COMPREHEN METABOLIC PANEL: CPT

## 2022-03-11 PROCEDURE — 6370000000 HC RX 637 (ALT 250 FOR IP): Performed by: HOSPITALIST

## 2022-03-11 PROCEDURE — 99232 SBSQ HOSP IP/OBS MODERATE 35: CPT | Performed by: HOSPITALIST

## 2022-03-11 PROCEDURE — 6370000000 HC RX 637 (ALT 250 FOR IP): Performed by: NURSE PRACTITIONER

## 2022-03-11 RX ORDER — CHLORDIAZEPOXIDE HYDROCHLORIDE 10 MG/1
10 CAPSULE, GELATIN COATED ORAL EVERY 6 HOURS PRN
Status: DISCONTINUED | OUTPATIENT
Start: 2022-03-11 | End: 2022-03-12 | Stop reason: HOSPADM

## 2022-03-11 RX ORDER — LORAZEPAM 2 MG/ML
1 INJECTION INTRAMUSCULAR ONCE
Status: COMPLETED | OUTPATIENT
Start: 2022-03-11 | End: 2022-03-11

## 2022-03-11 RX ADMIN — Medication 100 MG: at 09:17

## 2022-03-11 RX ADMIN — CHLORDIAZEPOXIDE HYDROCHLORIDE 25 MG: 25 CAPSULE ORAL at 09:17

## 2022-03-11 RX ADMIN — SODIUM CHLORIDE, PRESERVATIVE FREE 10 ML: 5 INJECTION INTRAVENOUS at 20:55

## 2022-03-11 RX ADMIN — Medication 1 TABLET: at 09:17

## 2022-03-11 RX ADMIN — ENOXAPARIN SODIUM 40 MG: 100 INJECTION SUBCUTANEOUS at 09:17

## 2022-03-11 RX ADMIN — CHLORDIAZEPOXIDE HYDROCHLORIDE 25 MG: 25 CAPSULE ORAL at 20:53

## 2022-03-11 RX ADMIN — ESCITALOPRAM OXALATE 30 MG: 20 TABLET ORAL at 09:17

## 2022-03-11 RX ADMIN — LORAZEPAM 1 MG: 2 INJECTION INTRAMUSCULAR; INTRAVENOUS at 00:12

## 2022-03-11 RX ADMIN — LISINOPRIL 20 MG: 20 TABLET ORAL at 12:37

## 2022-03-11 RX ADMIN — METOPROLOL TARTRATE 25 MG: 25 TABLET, FILM COATED ORAL at 09:17

## 2022-03-11 RX ADMIN — CHLORDIAZEPOXIDE HYDROCHLORIDE 25 MG: 25 CAPSULE ORAL at 15:43

## 2022-03-11 RX ADMIN — PANTOPRAZOLE SODIUM 40 MG: 40 TABLET, DELAYED RELEASE ORAL at 09:17

## 2022-03-11 RX ADMIN — SODIUM CHLORIDE, PRESERVATIVE FREE 10 ML: 5 INJECTION INTRAVENOUS at 09:38

## 2022-03-11 RX ADMIN — FOLIC ACID 1 MG: 1 TABLET ORAL at 09:17

## 2022-03-11 ASSESSMENT — PAIN SCALES - GENERAL
PAINLEVEL_OUTOF10: 0
PAINLEVEL_OUTOF10: 0

## 2022-03-11 NOTE — PROGRESS NOTES
Hospitalist Progress Note    Patient:  Nick Lara Deamicis      Unit/Bed:4A-03/003-A    YOB: 1975    MRN: [id number]       Acct: [de-identified]     PCP: Dolly Sibley    Date of Admission: 3/8/2022    Assessment/Plan:    1. Chronic alcohol abuse: Patient started on Librium 25mg PO TID. received Phenobarbital and ativan overnight. Continue with folic acid and thiamine. Seizure precautions in place. 2. Hypertension: Continue with home lisinopril, metoprolol and monitor blood pressure. 3. Depression anxiety: Continue with home dose of Lexapro. 4. VTE prophylaxis: Lovenox  5. Dispo: likely dc tomorrow      Subjective (past 24 hours):   Patient seen and examined at bedside feels better this morning but still required phenobarbital and ativan this overnight. Medications:  Reviewed    Infusion Medications    sodium chloride       Scheduled Medications    chlordiazePOXIDE  25 mg Oral TID    escitalopram  30 mg Oral Daily    folic acid  1 mg Oral Daily    lisinopril  20 mg Oral Daily    metoprolol tartrate  25 mg Oral BID    multivitamin  1 tablet Oral Daily    pantoprazole  40 mg Oral QAM AC    thiamine  100 mg Oral Daily    sodium chloride flush  5-40 mL IntraVENous 2 times per day    enoxaparin  40 mg SubCUTAneous Daily     PRN Meds: sodium chloride flush, sodium chloride, ondansetron **OR** ondansetron, polyethylene glycol, acetaminophen **OR** acetaminophen      Intake/Output Summary (Last 24 hours) at 3/11/2022 1128  Last data filed at 3/11/2022 6016  Gross per 24 hour   Intake 15 ml   Output 1100 ml   Net -1085 ml       Diet:  ADULT DIET; Regular    Exam:  /74   Pulse 76   Temp 97.7 °F (36.5 °C) (Oral)   Resp 16   Wt 219 lb 9.3 oz (99.6 kg)   SpO2 96%   BMI 31.51 kg/m²     General appearance: No apparent distress, appears stated age and cooperative. Respiratory:  Normal respiratory effort.  Clear to auscultation, bilaterally without Rales/Wheezes/Rhonchi. Cardiovascular: Regular rate and rhythm with normal S1/S2 without murmurs, rubs or gallops. Abdomen: Soft, non-tender, non-distended with normal bowel sounds. Extremities: no pedal edema    Labs:   Recent Labs     03/09/22  0320 03/10/22  0900 03/11/22  0345   WBC 5.8 5.6 6.7   HGB 14.6 14.6 15.9   HCT 42.7 42.9 47.1    233 242     Recent Labs     03/09/22  0320 03/10/22  0900 03/11/22  0345    134* 137   K 3.8 3.8 4.1    98 100   CO2 26 26 25   BUN 14 12 11   CREATININE 1.0 0.8 1.0   CALCIUM 9.1 8.8 8.9     Recent Labs     03/09/22  0320 03/10/22  0900 03/11/22  0345   AST 40 52* 49*   ALT 42 48 53   BILIDIR <0.2  --   --    BILITOT 0.6 0.4 0.3   ALKPHOS 83 81 86     No results for input(s): INR in the last 72 hours. No results for input(s): Ramin Snuffer in the last 72 hours. No results for input(s): PROCAL in the last 72 hours. Microbiology:      Urinalysis:      Lab Results   Component Value Date    NITRU NEGATIVE 03/05/2019    WBCUA 0-2 03/05/2019    BACTERIA NONE 03/05/2019    RBCUA 0-2 03/05/2019    BLOODU NEGATIVE 03/05/2019    SPECGRAV 1.028 05/18/2014    GLUCOSEU NEGATIVE 03/05/2019       Radiology:  No results found.     DVT prophylaxis: [x] Lovenox                                 [] SCDs                                 [] SQ Heparin                                 [] Encourage ambulation           [] Already on Anticoagulation     Code Status: Full Code    Tele:   [x] yes             [] no    Active Hospital Problems    Diagnosis Date Noted    Alcohol dependence with withdrawal ([facility] [address] [F10.239] 03/08/2022    Alcohol abuse with withdrawal ([facility] [address] [F10.139] 03/08/2022       Electronically signed by Nicolle Amaya MD on 3/11/2022 at 11:28 AM

## 2022-03-11 NOTE — PLAN OF CARE
Problem: Falls - Risk of:  Goal: Will remain free from falls  Description: Pt. Walked 280 feet throughout unit. No dizziness, Nausea, pain  and tolerated great. Free of falls during shift. 3/11/2022 1425 by Leoncio Guerrero  Outcome: Ongoing  3/11/2022 1416 by Leoncio Gibbsa  Outcome: Ongoing  Note: Pt. Ethan Snowball through [place], 280ft, within room, and free of falls and no signs of falling. Goal: Absence of physical injury  Description: Pt. Walked 280 feet throughout unit. No dizziness, Nausea, pain  and tolerated great. Free of falls during shift. 3/11/2022 1425 by Leoncio Guerrero  Outcome: Ongoing  3/11/2022 1416 by Leoncio Guerrero  Outcome: Ongoing  Note: Pt. Ethan Armasball through [place], 280ft, within room, and free of falls and no signs of falling. Problem: Pain:  Goal: Pain level will decrease  Description: Pt. Denied pain during shift. 3/11/2022 1425 by Leoncio Guerrero  Outcome: Ongoing  3/11/2022 1416 by Leoncio Guerrero  Outcome: Ongoing  Note: Pt. Denied pain throughout shift  Goal: Control of acute pain  Description: Pt. Denied pain during shift. 3/11/2022 1425 by Leoncio Guerrero  Outcome: Ongoing  3/11/2022 1416 by Leoncio Gibbsa  Outcome: Ongoing  Note: Pt. Denied pain throughout shift   Goal: Control of chronic pain  Description: Pt. Denied pain during shift. 3/11/2022 1425 by Leoncio Guerrero  Outcome: Ongoing  3/11/2022 1416 by Leoncio Gibbsa  Outcome: Ongoing  Note: Pt. Denied pain throughout shift      Problem: Discharge Planning:  Goal: Discharged to appropriate level of care  Description: Pt. Denied feelings of depression. No signs/symptoms observed during shift. Had a smile and was laughing periodically during shift. Demonstrated appropriate level of healthcare knowledge pertaining to withdrawal symptom and management.    3/11/2022 1425 by Leoncio Guerrero  Outcome: Ongoing  3/11/2022 1416 by Leoncio Guerrero  Note: Pt. Sammy Cools when to be discharged explained what Dr. Inocencio Gutierrez said about another day. No signs or symptoms of depression. Problem: Sleep Pattern Disturbance:  Goal: Appears well-rested  Description: Pt. Took 2hr nap throughout afternoon. Pt. Carolin Johns well rested. No signs of lack of sleep observed. 3/11/2022 1425 by Tao Hooker  Outcome: Ongoing  3/11/2022 1416 by Tao Hooker  Note: No sleep deprivation noted. Took a walk around unit, took it well. Pt listened to music and watched tv.

## 2022-03-11 NOTE — CARE COORDINATION
3/11/22, 11:41 AM EST    Patient goals/plan/ treatment preferences discussed by  and . Patient goals/plan/ treatment preferences reviewed with patient/ family. Patient/ family verbalize understanding of discharge plan and are in agreement with goal/plan/treatment preferences. Understanding was demonstrated using the teach back method. AVS provided by RN at time of discharge, which includes all necessary medical information pertaining to the patients current course of illness, treatment, post-discharge goals of care, and treatment preferences. Potential weekend discharge to home. Denies needs or services.

## 2022-03-11 NOTE — CARE COORDINATION
Discharge planning update:    Pt still requiring Phenobarbital as well as Ativan. Addiction Service consult complete. Librium, Lovenox, Lexapro. Plan is to return home at discharge.   Electronically signed by Brayden Andrew RN on 3/11/2022 at 11:41 AM

## 2022-03-12 VITALS
OXYGEN SATURATION: 98 % | SYSTOLIC BLOOD PRESSURE: 106 MMHG | RESPIRATION RATE: 16 BRPM | WEIGHT: 219.58 LBS | BODY MASS INDEX: 31.51 KG/M2 | DIASTOLIC BLOOD PRESSURE: 74 MMHG | TEMPERATURE: 97.6 F | HEART RATE: 71 BPM

## 2022-03-12 LAB
ALBUMIN SERPL-MCNC: 4.1 G/DL (ref 3.5–5.1)
ALP BLD-CCNC: 85 U/L (ref 38–126)
ALT SERPL-CCNC: 53 U/L (ref 11–66)
ANION GAP SERPL CALCULATED.3IONS-SCNC: 11 MEQ/L (ref 8–16)
AST SERPL-CCNC: 45 U/L (ref 5–40)
BASOPHILS # BLD: 0.5 %
BASOPHILS ABSOLUTE: 0 THOU/MM3 (ref 0–0.1)
BILIRUB SERPL-MCNC: 0.3 MG/DL (ref 0.3–1.2)
BUN BLDV-MCNC: 13 MG/DL (ref 7–22)
CALCIUM SERPL-MCNC: 9.1 MG/DL (ref 8.5–10.5)
CHLORIDE BLD-SCNC: 100 MEQ/L (ref 98–111)
CO2: 27 MEQ/L (ref 23–33)
CREAT SERPL-MCNC: 1.1 MG/DL (ref 0.4–1.2)
EOSINOPHIL # BLD: 3.1 %
EOSINOPHILS ABSOLUTE: 0.2 THOU/MM3 (ref 0–0.4)
ERYTHROCYTE [DISTWIDTH] IN BLOOD BY AUTOMATED COUNT: 12.5 % (ref 11.5–14.5)
ERYTHROCYTE [DISTWIDTH] IN BLOOD BY AUTOMATED COUNT: 46.8 FL (ref 35–45)
GFR SERPL CREATININE-BSD FRML MDRD: 72 ML/MIN/1.73M2
GLUCOSE BLD-MCNC: 86 MG/DL (ref 70–108)
HCT VFR BLD CALC: 45.7 % (ref 42–52)
HEMOGLOBIN: 15.4 GM/DL (ref 14–18)
IMMATURE GRANS (ABS): 0.04 THOU/MM3 (ref 0–0.07)
IMMATURE GRANULOCYTES: 0.7 %
LYMPHOCYTES # BLD: 28.1 %
LYMPHOCYTES ABSOLUTE: 1.6 THOU/MM3 (ref 1–4.8)
MCH RBC QN AUTO: 34.5 PG (ref 26–33)
MCHC RBC AUTO-ENTMCNC: 33.7 GM/DL (ref 32.2–35.5)
MCV RBC AUTO: 102.5 FL (ref 80–94)
MONOCYTES # BLD: 11.8 %
MONOCYTES ABSOLUTE: 0.7 THOU/MM3 (ref 0.4–1.3)
NUCLEATED RED BLOOD CELLS: 0 /100 WBC
PLATELET # BLD: 225 THOU/MM3 (ref 130–400)
PMV BLD AUTO: 10 FL (ref 9.4–12.4)
POTASSIUM SERPL-SCNC: 4.4 MEQ/L (ref 3.5–5.2)
RBC # BLD: 4.46 MILL/MM3 (ref 4.7–6.1)
SEG NEUTROPHILS: 55.8 %
SEGMENTED NEUTROPHILS ABSOLUTE COUNT: 3.2 THOU/MM3 (ref 1.8–7.7)
SODIUM BLD-SCNC: 138 MEQ/L (ref 135–145)
TOTAL PROTEIN: 7.1 G/DL (ref 6.1–8)
WBC # BLD: 5.8 THOU/MM3 (ref 4.8–10.8)

## 2022-03-12 PROCEDURE — 36415 COLL VENOUS BLD VENIPUNCTURE: CPT

## 2022-03-12 PROCEDURE — 99239 HOSP IP/OBS DSCHRG MGMT >30: CPT | Performed by: HOSPITALIST

## 2022-03-12 PROCEDURE — 80053 COMPREHEN METABOLIC PANEL: CPT

## 2022-03-12 PROCEDURE — 6370000000 HC RX 637 (ALT 250 FOR IP): Performed by: NURSE PRACTITIONER

## 2022-03-12 PROCEDURE — 85025 COMPLETE CBC W/AUTO DIFF WBC: CPT

## 2022-03-12 PROCEDURE — 2580000003 HC RX 258: Performed by: NURSE PRACTITIONER

## 2022-03-12 PROCEDURE — 6360000002 HC RX W HCPCS: Performed by: NURSE PRACTITIONER

## 2022-03-12 RX ORDER — FOLIC ACID 1 MG/1
1 TABLET ORAL DAILY
Qty: 30 TABLET | Refills: 1 | Status: SHIPPED | OUTPATIENT
Start: 2022-03-12

## 2022-03-12 RX ORDER — LANOLIN ALCOHOL/MO/W.PET/CERES
100 CREAM (GRAM) TOPICAL DAILY
Qty: 30 TABLET | Refills: 1 | Status: SHIPPED | OUTPATIENT
Start: 2022-03-12

## 2022-03-12 RX ORDER — CHLORDIAZEPOXIDE HYDROCHLORIDE 10 MG/1
CAPSULE, GELATIN COATED ORAL
Qty: 6 CAPSULE | Refills: 0 | Status: SHIPPED | OUTPATIENT
Start: 2022-03-12 | End: 2022-03-14

## 2022-03-12 RX ADMIN — FOLIC ACID 1 MG: 1 TABLET ORAL at 09:28

## 2022-03-12 RX ADMIN — ENOXAPARIN SODIUM 40 MG: 100 INJECTION SUBCUTANEOUS at 09:28

## 2022-03-12 RX ADMIN — LISINOPRIL 20 MG: 20 TABLET ORAL at 09:28

## 2022-03-12 RX ADMIN — Medication 1 TABLET: at 09:28

## 2022-03-12 RX ADMIN — Medication 100 MG: at 09:29

## 2022-03-12 RX ADMIN — ESCITALOPRAM OXALATE 30 MG: 20 TABLET ORAL at 09:28

## 2022-03-12 RX ADMIN — PANTOPRAZOLE SODIUM 40 MG: 40 TABLET, DELAYED RELEASE ORAL at 06:43

## 2022-03-12 RX ADMIN — METOPROLOL TARTRATE 25 MG: 25 TABLET, FILM COATED ORAL at 09:28

## 2022-03-12 RX ADMIN — SODIUM CHLORIDE, PRESERVATIVE FREE 10 ML: 5 INJECTION INTRAVENOUS at 09:30

## 2022-03-12 ASSESSMENT — PAIN DESCRIPTION - PROGRESSION: CLINICAL_PROGRESSION: GRADUALLY WORSENING

## 2022-03-12 ASSESSMENT — PAIN DESCRIPTION - FREQUENCY: FREQUENCY: CONTINUOUS

## 2022-03-12 ASSESSMENT — PAIN DESCRIPTION - LOCATION: LOCATION: BACK

## 2022-03-12 ASSESSMENT — PAIN DESCRIPTION - PAIN TYPE: TYPE: CHRONIC PAIN

## 2022-03-12 ASSESSMENT — PAIN SCALES - GENERAL
PAINLEVEL_OUTOF10: 3
PAINLEVEL_OUTOF10: 0

## 2022-03-12 ASSESSMENT — PAIN DESCRIPTION - ONSET: ONSET: ON-GOING

## 2022-03-12 ASSESSMENT — PAIN DESCRIPTION - DESCRIPTORS: DESCRIPTORS: SHARP;DULL;ACHING

## 2022-03-12 ASSESSMENT — PAIN DESCRIPTION - ORIENTATION: ORIENTATION: MID

## 2022-03-12 ASSESSMENT — PAIN - FUNCTIONAL ASSESSMENT: PAIN_FUNCTIONAL_ASSESSMENT: PREVENTS OR INTERFERES WITH MANY ACTIVE NOT PASSIVE ACTIVITIES

## 2022-03-12 NOTE — PROGRESS NOTES
Pt. Described his reasons as to why he was depressed and started to drink. Pt. Stated divorce from wife 4 years ago was bad. Lost his house, children, and only left with his truck and a bag full of clothes. Lived in brothers basement. Does not see his children at all. States biggest support system is his mother who lives 1 hr away from pt. apartment. Pt. States that when he does drink, he drinks alone typically. Stated his depression has increased. I asked if he felt as though his antidepressants, lexapro, was helping his feelings of depression. Denies Suicidal ideation at this time. He stated that he gets really bad when he doesn't take it. I taught him about how not to skip doses of antidepressants and how alcohol is a depressant. Updated primary RN.

## 2022-03-12 NOTE — PROGRESS NOTES
AVS reviewed with patient. Work excuse and script sent with patient. Pt also given resources including information for a local Al-Anon meeting and to addiction services in his area.

## 2024-02-02 ENCOUNTER — OFFICE VISIT (OUTPATIENT)
Age: 49
End: 2024-02-02

## 2024-02-02 DIAGNOSIS — U07.1 COVID-19: Primary | ICD-10-CM

## 2024-02-02 NOTE — PATIENT INSTRUCTIONS
Increase water intake  Take mucinex for symptom relief congestion, zyrtec for sore throat  Tylenol for muscle aches/fever  Warm tea  Warm showers    Prevention steps for People with confirmed or suspected COVID-19 (including persons under investigation) who do not need to be hospitalized  and   People with confirmed COVID-19 who were hospitalized and determined to be medically stable to go home    Your healthcare provider and public health staff will evaluate whether you can be cared for at home. If it is determined that you do not need to be hospitalized and can be isolated at home, you will be monitored by staff from your local or state health department. You should follow the prevention steps below until a healthcare provider or local or state health department says you can return to your normal activities.  Stay home except to get medical care  People who are mildly ill with COVID-19 are able to isolate at home during their illness. You should restrict activities outside your home, except for getting medical care. Do not go to work, school, or public areas. Avoid using public transportation, ride-sharing, or taxis.  Separate yourself from other people and animals in your home  People: As much as possible, you should stay in a specific room and away from other people in your home. Also, you should use a separate bathroom, if available.  Animals: You should restrict contact with pets and other animals while you are sick with COVID-19, just like you would around other people. Although there have not been reports of pets or other animals becoming sick with COVID-19, it is still recommended that people sick with COVID-19 limit contact with animals until more information is known about the virus. When possible, have another member of your household care for your animals while you are sick. If you are sick with COVID-19, avoid contact with your pet, including petting, snuggling, being kissed or licked, and sharing

## 2024-02-02 NOTE — PROGRESS NOTES
SUBJECTIVE:   Guerrero Farooq is a 48 y.o. male who complains of congestion, sore throat, swollen glands, night sweats, myalgias, and headache for 1-2 days. Symptoms started mild sinus congestion, Gradually got worse. Symptoms persist/increased through out the day yesterday and he felt bad last night.  . He denies a history of chest pain and vomiting and denies a history of asthma. Patient denies smoke cigarettes.     OBJECTIVE:  He appears acutely ill. Communicates clearly, covid test performed in car  ASSESSMENT:   viral upper respiratory illness    PLAN:  Symptomatic therapy suggested: push fluids, rest, and return office visit prn if symptoms persist or worsen. Lack of antibiotic effectiveness discussed with him. Call or return to clinic prn if these symptoms worsen or fail to improve as anticipated.

## 2024-02-09 ENCOUNTER — OFFICE VISIT (OUTPATIENT)
Age: 49
End: 2024-02-09

## 2024-02-09 DIAGNOSIS — F41.9 ANXIETY: Primary | ICD-10-CM

## 2024-02-09 DIAGNOSIS — F43.0 PANIC ATTACK AS REACTION TO STRESS: ICD-10-CM

## 2024-02-09 DIAGNOSIS — F41.0 PANIC ATTACK AS REACTION TO STRESS: ICD-10-CM

## 2024-02-09 DIAGNOSIS — U09.9 POST COVID-19 CONDITION, UNSPECIFIED: ICD-10-CM

## 2024-02-09 RX ORDER — BUSPIRONE HYDROCHLORIDE 15 MG/1
15 TABLET ORAL 3 TIMES DAILY
Qty: 60 TABLET | Refills: 0 | Status: SHIPPED | OUTPATIENT
Start: 2024-02-09

## 2024-02-09 NOTE — PROGRESS NOTES
Pt f/u from covid not feeling well. His 5 day isolation period ended on 2/7/24. Pt did not return to work because of feeling head pressure, feelings of anxiety related to having covid. Pt states he did not do much activity at home. He currently is in process of moving, only has mattress on floor.  He stopped taking lexapro and metoprolol because he left them at his parents house.  Increased anxiety with illness. Pt endorses a hx of 1-2 anxiety attacks a year, use to take ativan. Denies chest pain denies fever. Pt states he tried several medications outside of ativan that were not helpful, only able to recall name of vistaril..  Pt noted to be tremendous during exam, throughout office visit anxiety levels appear to gradually increase. While provider was checking the results of poct covid test, pt came to front to ask for ativan, and had presyncopal episode where he could barely stand d/t whole body shakiness, he was assisted to ground by clinic staff. . His hr elevated to 150, he became flushed, this episode managed with verbal de-escalation , cold compress to neck and deep breaths.   When pt's condition improved he ambulated to room with two person assist and laid down on exam table.  At this time he  disclosed additional stressors: including divorce in 2018 and feeling increased distance/sepration from kids, being fearful he will lose his job if unable to return today, and heavy alcohol consumption (8 shots a day). Pt finally began to cry uncontrollably and states he has to try not to think about his stressors because the \"thoughts kill him.\" He denies any si/hi at this time.   Pt given Buspar 15 mg while in office out of new rx dispensed.  Over 1 hr spent providing therapeutic communication to assist pt with mood. Pt encouraged to take a couple more days off work to rest and recover, discussed options for etoh dependence and [facility] wellness support information.  We also discussed alcohol dependence and options

## 2024-02-20 ENCOUNTER — OFFICE VISIT (OUTPATIENT)
Age: 49
End: 2024-02-20

## 2024-02-20 VITALS
OXYGEN SATURATION: 99 % | HEART RATE: 99 BPM | SYSTOLIC BLOOD PRESSURE: 128 MMHG | DIASTOLIC BLOOD PRESSURE: 90 MMHG | TEMPERATURE: 99 F | RESPIRATION RATE: 16 BRPM

## 2024-02-20 DIAGNOSIS — F41.9 ANXIETY: ICD-10-CM

## 2024-02-20 DIAGNOSIS — R00.2 PALPITATION: ICD-10-CM

## 2024-02-20 DIAGNOSIS — H65.192 OTHER NON-RECURRENT ACUTE NONSUPPURATIVE OTITIS MEDIA OF LEFT EAR: Primary | ICD-10-CM

## 2024-02-20 PROBLEM — K21.00 GASTRO-ESOPHAGEAL REFLUX DISEASE WITH ESOPHAGITIS: Status: ACTIVE | Noted: 2018-07-11

## 2024-02-20 PROBLEM — K70.0 ALCOHOL INDUCED FATTY LIVER: Status: ACTIVE | Noted: 2018-07-19

## 2024-02-20 PROBLEM — F22 PARANOID DISORDER (HCC): Status: ACTIVE | Noted: 2024-02-20

## 2024-02-20 PROBLEM — H66.90 OTITIS MEDIA: Status: ACTIVE | Noted: 2024-02-20

## 2024-02-20 PROBLEM — R20.2 PARESTHESIA: Status: ACTIVE | Noted: 2024-02-20

## 2024-02-20 PROBLEM — R79.89 ELEVATED LFTS: Status: ACTIVE | Noted: 2018-07-12

## 2024-02-20 PROBLEM — E03.9 HYPOTHYROIDISM: Status: ACTIVE | Noted: 2024-02-20

## 2024-02-20 PROBLEM — F10.11 HISTORY OF ETOH ABUSE: Status: ACTIVE | Noted: 2020-06-24

## 2024-02-20 PROBLEM — R53.83 TIREDNESS: Status: ACTIVE | Noted: 2020-02-24

## 2024-02-20 PROBLEM — M21.41 FLAT FEET, BILATERAL: Status: ACTIVE | Noted: 2019-03-25

## 2024-02-20 PROBLEM — R94.39 ABNORMAL CARDIOVASCULAR STRESS TEST: Status: ACTIVE | Noted: 2024-02-20

## 2024-02-20 PROBLEM — M21.42 FLAT FEET, BILATERAL: Status: ACTIVE | Noted: 2019-03-25

## 2024-02-20 PROBLEM — N52.2 DRUG-INDUCED ERECTILE DYSFUNCTION: Status: ACTIVE | Noted: 2019-10-28

## 2024-02-20 PROBLEM — K21.9 GASTROESOPHAGEAL REFLUX DISEASE WITHOUT ESOPHAGITIS: Status: ACTIVE | Noted: 2019-03-25

## 2024-02-20 RX ORDER — IBUPROFEN 800 MG/1
800 TABLET ORAL EVERY 6 HOURS PRN
COMMUNITY
Start: 2023-02-13

## 2024-02-20 RX ORDER — AZITHROMYCIN 250 MG/1
TABLET, FILM COATED ORAL
Qty: 6 TABLET | Refills: 0 | Status: SHIPPED | OUTPATIENT
Start: 2024-02-20 | End: 2024-03-01

## 2024-02-20 RX ORDER — AMOXICILLIN 875 MG/1
875 TABLET, COATED ORAL 2 TIMES DAILY
Qty: 20 TABLET | Refills: 0 | Status: SHIPPED | OUTPATIENT
Start: 2024-02-20 | End: 2024-02-20 | Stop reason: CLARIF

## 2024-02-20 ASSESSMENT — PATIENT HEALTH QUESTIONNAIRE - PHQ9
SUM OF ALL RESPONSES TO PHQ QUESTIONS 1-9: 8
4. FEELING TIRED OR HAVING LITTLE ENERGY: 1
SUM OF ALL RESPONSES TO PHQ QUESTIONS 1-9: 8
SUM OF ALL RESPONSES TO PHQ9 QUESTIONS 1 & 2: 2
SUM OF ALL RESPONSES TO PHQ QUESTIONS 1-9: 8
6. FEELING BAD ABOUT YOURSELF - OR THAT YOU ARE A FAILURE OR HAVE LET YOURSELF OR YOUR FAMILY DOWN: 1
SUM OF ALL RESPONSES TO PHQ QUESTIONS 1-9: 8
2. FEELING DOWN, DEPRESSED OR HOPELESS: 1
8. MOVING OR SPEAKING SO SLOWLY THAT OTHER PEOPLE COULD HAVE NOTICED. OR THE OPPOSITE, BEING SO FIGETY OR RESTLESS THAT YOU HAVE BEEN MOVING AROUND A LOT MORE THAN USUAL: 2
5. POOR APPETITE OR OVEREATING: 0
1. LITTLE INTEREST OR PLEASURE IN DOING THINGS: 1
10. IF YOU CHECKED OFF ANY PROBLEMS, HOW DIFFICULT HAVE THESE PROBLEMS MADE IT FOR YOU TO DO YOUR WORK, TAKE CARE OF THINGS AT HOME, OR GET ALONG WITH OTHER PEOPLE: 1
7. TROUBLE CONCENTRATING ON THINGS, SUCH AS READING THE NEWSPAPER OR WATCHING TELEVISION: 1
9. THOUGHTS THAT YOU WOULD BE BETTER OFF DEAD, OR OF HURTING YOURSELF: 0
3. TROUBLE FALLING OR STAYING ASLEEP: 1

## 2024-02-20 ASSESSMENT — ANXIETY QUESTIONNAIRES
5. BEING SO RESTLESS THAT IT IS HARD TO SIT STILL: 0
2. NOT BEING ABLE TO STOP OR CONTROL WORRYING: 2
6. BECOMING EASILY ANNOYED OR IRRITABLE: 3
IF YOU CHECKED OFF ANY PROBLEMS ON THIS QUESTIONNAIRE, HOW DIFFICULT HAVE THESE PROBLEMS MADE IT FOR YOU TO DO YOUR WORK, TAKE CARE OF THINGS AT HOME, OR GET ALONG WITH OTHER PEOPLE: SOMEWHAT DIFFICULT
1. FEELING NERVOUS, ANXIOUS, OR ON EDGE: 2
4. TROUBLE RELAXING: 0
GAD7 TOTAL SCORE: 13
7. FEELING AFRAID AS IF SOMETHING AWFUL MIGHT HAPPEN: 3
3. WORRYING TOO MUCH ABOUT DIFFERENT THINGS: 3

## 2024-02-20 ASSESSMENT — ENCOUNTER SYMPTOMS
SINUS PRESSURE: 1
EYE PAIN: 0
DIARRHEA: 1
COUGH: 1
RHINORRHEA: 1
EYE DISCHARGE: 0
EYE REDNESS: 1
VOMITING: 0
TROUBLE SWALLOWING: 0
SINUS PAIN: 1
ABDOMINAL PAIN: 1
SORE THROAT: 0
NAUSEA: 0
EYE ITCHING: 0
WHEEZING: 0
CHEST TIGHTNESS: 0
VOICE CHANGE: 0
SHORTNESS OF BREATH: 0

## 2024-02-20 NOTE — PROGRESS NOTES
Guerrero Farooq (:  1975) is a 48 y.o. male,New patient, here for evaluation of the following chief complaint(s):  Dizziness (Establish PCP care here at the clinic.)     ASSESSMENT/PLAN:  1. Other non-recurrent acute nonsuppurative otitis media of left ear  Assessment & Plan:  Left ear infection without suppuration. Given Z-pack since it will be shorter duration and once daily to increase the chance of compliance. Instructed not to drink with alcohol. Discussed how to take the medication, 2 tabs today, then 1 tab daily for 4 days or until gone. Advised him to follow up in 1 week to check his ear.  Advised to take OTC Tylenol for his low grade fever, chills, and muscle and joint aches. Advised drink plenty of water, eat 3 healthy meals and rest as possible.     Orders:  -     azithromycin (ZITHROMAX) 250 MG tablet; 500mg on day 1 followed by 250mg on days 2 - 5, Disp-6 tablet, R-0Print  2. Palpitation  Assessment & Plan:  HR: 99. Offered to lay down and rest on the table. He preferred to sit on the chair. Offered short acting anti-anxiety medication, and discussed Buspar and Hydroxyzine. He declined taking any medication stating that none of them has been working for him. Discussed about his anxiety and fidgety movements and offered adding another medication to Escitalopram. He stated that he has been on this medication for 22 years, tried different medications and Escitalopram is the one helping better than others.   Asked when he takes Metoprolol 25 mg. He stated that takes only once daily and whenever he remembers. Could be in the morning or evening.   Discussed about medication compliance is very important managing his symptoms. 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  3. Anxiety  Assessment & Plan:  PHQ-9 Total Score: 8 (2024  9:09 AM)  Thoughts that you would be better off dead, or of hurting yourself in some way: 0 (2024  9:09 AM)        2024     9:10 AM   DIAMOND

## 2024-03-01 ENCOUNTER — TELEPHONE (OUTPATIENT)
Age: 49
End: 2024-03-01

## 2024-03-01 NOTE — TELEPHONE ENCOUNTER
Attempted to telephone patient requesting he come in to sign an authorization for release of PHI.  There was no answer and recording said that voicemail has not been set up to receive messages.  Will continue to attempt to contact patient.

## 2024-03-05 ENCOUNTER — TELEPHONE (OUTPATIENT)
Age: 49
End: 2024-03-05

## 2024-03-05 NOTE — TELEPHONE ENCOUNTER
Spoke with patient regarding need for his signature on Authorization for release of PHI form.  States he cannot come in today to sign, but will tomorrow.

## 2024-03-07 ENCOUNTER — OFFICE VISIT (OUTPATIENT)
Age: 49
End: 2024-03-07

## 2024-03-07 DIAGNOSIS — F41.9 ANXIETY: ICD-10-CM

## 2024-03-07 DIAGNOSIS — I10 PRIMARY HYPERTENSION: ICD-10-CM

## 2024-03-07 DIAGNOSIS — E03.9 HYPOTHYROIDISM, UNSPECIFIED TYPE: ICD-10-CM

## 2024-03-07 DIAGNOSIS — Z91.199 PATIENT NON ADHERENCE: ICD-10-CM

## 2024-03-07 DIAGNOSIS — F10.20 UNCOMPLICATED ALCOHOL DEPENDENCE (HCC): Primary | ICD-10-CM

## 2024-03-07 DIAGNOSIS — J32.0 CHRONIC MAXILLARY SINUSITIS: ICD-10-CM

## 2024-03-07 RX ORDER — FLUTICASONE PROPIONATE 50 MCG
2 SPRAY, SUSPENSION (ML) NASAL DAILY
Qty: 16 G | Refills: 0 | Status: SHIPPED | OUTPATIENT
Start: 2024-03-07

## 2024-03-07 ASSESSMENT — ENCOUNTER SYMPTOMS
CHEST TIGHTNESS: 0
FACIAL SWELLING: 0
SINUS PAIN: 0
SINUS PRESSURE: 1
SORE THROAT: 0
ABDOMINAL DISTENTION: 0
SHORTNESS OF BREATH: 0

## 2024-03-07 NOTE — PATIENT INSTRUCTIONS
Discussed risk /symptoms of alcohol dependence including forgetfulness and risk at work   And concern r/t to mental illness  Start flonase and referral for sinusitis  Take medication as precribed.   F/u  Dr. Romeo [phone]

## 2024-03-07 NOTE — PROGRESS NOTES
Pt f/u from hospital visit he was seen 2/10  at [facility] then transferred to [facility] 2/11/24- 2/13/24 for alcohol withdrawal, anxiety and chest pains.   The following orders were started  in the hospital, atorvastatin 20 mg levothyroxine 50mct, increase metoprolol to 100mg, and librium.  Pt was consulted by  Dr. Romeo psychiatrist during his hospital visit.   Pt reluctant to acknowledge medical concerns addressed in the hosptial, he also reprots he did not get any medication that was ordered. His concerns at this time are pressure to left side of face and loss of smell since having covid-19 1 mo ago.  Pt denies auditory or visual hallucinations, does admit to drinking heavily daily. He has no interest in cutting back at this time.     Review of Systems   Constitutional:  Negative for fatigue and fever.   HENT:  Positive for congestion and sinus pressure. Negative for facial swelling, mouth sores, sinus pain and sore throat.    Respiratory:  Negative for chest tightness and shortness of breath.    Cardiovascular:  Negative for chest pain.   Gastrointestinal:  Negative for abdominal distention.   Neurological:  Negative for dizziness and light-headedness.   Psychiatric/Behavioral:  Positive for dysphoric mood.         Physical Exam  Constitutional:       Appearance: Normal appearance.   HENT:      Head: Normocephalic.   Eyes:      Pupils: Pupils are equal, round, and reactive to light.   Cardiovascular:      Rate and Rhythm: Normal rate.   Pulmonary:      Effort: Pulmonary effort is normal.      Breath sounds: Normal breath sounds.   Neurological:      Mental Status: He is alert.   Psychiatric:      Comments: Thought content poor ability to communicate feelings and history of events, pt can not recall what hospital he  went to ,pt reluctant to discuss concerns of mental health. Focused on covid and sinus symptoms.      Euthymic mood,     Fair eye contact    Guarded          Diagnosis Orders   1. Uncomplicated

## 2024-03-08 DIAGNOSIS — I10 HYPERTENSION, UNSPECIFIED TYPE: Primary | ICD-10-CM

## 2024-03-08 RX ORDER — METOPROLOL SUCCINATE 100 MG/1
100 TABLET, EXTENDED RELEASE ORAL DAILY
Qty: 90 TABLET | Refills: 0 | Status: SHIPPED | OUTPATIENT
Start: 2024-03-08

## 2024-04-15 PROBLEM — F10.10 ALCOHOL ABUSE: Status: ACTIVE | Noted: 2024-04-15

## 2024-06-02 ENCOUNTER — APPOINTMENT (OUTPATIENT)
Dept: CT IMAGING | Age: 49
End: 2024-06-02
Payer: MEDICAID

## 2024-06-02 ENCOUNTER — HOSPITAL ENCOUNTER (INPATIENT)
Age: 49
LOS: 2 days | Discharge: HOME | End: 2024-06-05
Attending: STUDENT IN AN ORGANIZED HEALTH CARE EDUCATION/TRAINING PROGRAM | Admitting: INTERNAL MEDICINE
Payer: MEDICAID

## 2024-06-02 DIAGNOSIS — F10.920 ACUTE ALCOHOLIC INTOXICATION WITHOUT COMPLICATION (HCC): Primary | ICD-10-CM

## 2024-06-02 PROBLEM — F10.229 ACUTE ALCOHOL INTOXICATION WITH ALCOHOLISM, WITH UNSPECIFIED COMPLICATION (HCC): Status: ACTIVE | Noted: 2024-06-02

## 2024-06-02 LAB
ALBUMIN SERPL BCG-MCNC: 4.6 G/DL (ref 3.5–5.1)
ALP SERPL-CCNC: 131 U/L (ref 38–126)
ALT SERPL W/O P-5'-P-CCNC: 23 U/L (ref 11–66)
ANION GAP SERPL CALC-SCNC: 16 MEQ/L (ref 8–16)
AST SERPL-CCNC: 29 U/L (ref 5–40)
BASOPHILS ABSOLUTE: 0.1 THOU/MM3 (ref 0–0.1)
BASOPHILS NFR BLD AUTO: 0.9 %
BILIRUB SERPL-MCNC: 0.3 MG/DL (ref 0.3–1.2)
BUN SERPL-MCNC: 10 MG/DL (ref 7–22)
CALCIUM SERPL-MCNC: 9.1 MG/DL (ref 8.5–10.5)
CHLORIDE SERPL-SCNC: 96 MEQ/L (ref 98–111)
CO2 SERPL-SCNC: 27 MEQ/L (ref 23–33)
CREAT SERPL-MCNC: 1 MG/DL (ref 0.4–1.2)
DEPRECATED RDW RBC AUTO: 43.5 FL (ref 35–45)
EKG ATRIAL RATE: 106 BPM
EKG P AXIS: 35 DEGREES
EKG P-R INTERVAL: 144 MS
EKG Q-T INTERVAL: 342 MS
EKG QRS DURATION: 82 MS
EKG QTC CALCULATION (BAZETT): 454 MS
EKG R AXIS: 73 DEGREES
EKG T AXIS: 42 DEGREES
EKG VENTRICULAR RATE: 106 BPM
EOSINOPHIL NFR BLD AUTO: 0.7 %
EOSINOPHILS ABSOLUTE: 0 THOU/MM3 (ref 0–0.4)
ERYTHROCYTE [DISTWIDTH] IN BLOOD BY AUTOMATED COUNT: 11.9 % (ref 11.5–14.5)
ETHANOL SERPL-MCNC: 0.41 % (ref 0–0.08)
FOLATE SERPL-MCNC: 3.5 NG/ML (ref 4.8–24.2)
GFR SERPL CREATININE-BSD FRML MDRD: > 90 ML/MIN/1.73M2
GLUCOSE SERPL-MCNC: 111 MG/DL (ref 70–108)
HCT VFR BLD AUTO: 47.2 % (ref 42–52)
HGB BLD-MCNC: 16.2 GM/DL (ref 14–18)
IMM GRANULOCYTES # BLD AUTO: 0.01 THOU/MM3 (ref 0–0.07)
IMM GRANULOCYTES NFR BLD AUTO: 0.2 %
LIPASE SERPL-CCNC: 43.9 U/L (ref 5.6–51.3)
LYMPHOCYTES ABSOLUTE: 2.2 THOU/MM3 (ref 1–4.8)
LYMPHOCYTES NFR BLD AUTO: 37.6 %
MCH RBC QN AUTO: 33.6 PG (ref 26–33)
MCHC RBC AUTO-ENTMCNC: 34.3 GM/DL (ref 32.2–35.5)
MCV RBC AUTO: 97.9 FL (ref 80–94)
MONOCYTES ABSOLUTE: 0.5 THOU/MM3 (ref 0.4–1.3)
MONOCYTES NFR BLD AUTO: 8.1 %
NEUTROPHILS ABSOLUTE: 3 THOU/MM3 (ref 1.8–7.7)
NEUTROPHILS NFR BLD AUTO: 52.5 %
NRBC BLD AUTO-RTO: 0 /100 WBC
OSMOLALITY SERPL CALC.SUM OF ELEC: 277.3 MOSMOL/KG (ref 275–300)
PLATELET # BLD AUTO: 389 THOU/MM3 (ref 130–400)
PMV BLD AUTO: 9.7 FL (ref 9.4–12.4)
POTASSIUM SERPL-SCNC: 4.1 MEQ/L (ref 3.5–5.2)
PROT SERPL-MCNC: 8.1 G/DL (ref 6.1–8)
RBC # BLD AUTO: 4.82 MILL/MM3 (ref 4.7–6.1)
SODIUM SERPL-SCNC: 139 MEQ/L (ref 135–145)
TROPONIN, HIGH SENSITIVITY: 7 NG/L (ref 0–12)
VIT B12 SERPL-MCNC: 623 PG/ML (ref 211–911)
WBC # BLD AUTO: 5.8 THOU/MM3 (ref 4.8–10.8)

## 2024-06-02 PROCEDURE — 36415 COLL VENOUS BLD VENIPUNCTURE: CPT

## 2024-06-02 PROCEDURE — 2580000003 HC RX 258

## 2024-06-02 PROCEDURE — 82077 ASSAY SPEC XCP UR&BREATH IA: CPT

## 2024-06-02 PROCEDURE — 93005 ELECTROCARDIOGRAM TRACING: CPT | Performed by: STUDENT IN AN ORGANIZED HEALTH CARE EDUCATION/TRAINING PROGRAM

## 2024-06-02 PROCEDURE — 82607 VITAMIN B-12: CPT

## 2024-06-02 PROCEDURE — 80053 COMPREHEN METABOLIC PANEL: CPT

## 2024-06-02 PROCEDURE — 70450 CT HEAD/BRAIN W/O DYE: CPT

## 2024-06-02 PROCEDURE — 6360000002 HC RX W HCPCS

## 2024-06-02 PROCEDURE — 96374 THER/PROPH/DIAG INJ IV PUSH: CPT

## 2024-06-02 PROCEDURE — 96365 THER/PROPH/DIAG IV INF INIT: CPT

## 2024-06-02 PROCEDURE — 85025 COMPLETE CBC W/AUTO DIFF WBC: CPT

## 2024-06-02 PROCEDURE — 99223 1ST HOSP IP/OBS HIGH 75: CPT

## 2024-06-02 PROCEDURE — 2500000003 HC RX 250 WO HCPCS

## 2024-06-02 PROCEDURE — 74177 CT ABD & PELVIS W/CONTRAST: CPT

## 2024-06-02 PROCEDURE — 6360000002 HC RX W HCPCS: Performed by: STUDENT IN AN ORGANIZED HEALTH CARE EDUCATION/TRAINING PROGRAM

## 2024-06-02 PROCEDURE — C9113 INJ PANTOPRAZOLE SODIUM, VIA: HCPCS

## 2024-06-02 PROCEDURE — 96368 THER/DIAG CONCURRENT INF: CPT

## 2024-06-02 PROCEDURE — 82746 ASSAY OF FOLIC ACID SERUM: CPT

## 2024-06-02 PROCEDURE — 96375 TX/PRO/DX INJ NEW DRUG ADDON: CPT

## 2024-06-02 PROCEDURE — 83690 ASSAY OF LIPASE: CPT

## 2024-06-02 PROCEDURE — 99285 EMERGENCY DEPT VISIT HI MDM: CPT

## 2024-06-02 PROCEDURE — 96366 THER/PROPH/DIAG IV INF ADDON: CPT

## 2024-06-02 PROCEDURE — 72125 CT NECK SPINE W/O DYE: CPT

## 2024-06-02 PROCEDURE — G0378 HOSPITAL OBSERVATION PER HR: HCPCS

## 2024-06-02 PROCEDURE — 93010 ELECTROCARDIOGRAM REPORT: CPT | Performed by: INTERNAL MEDICINE

## 2024-06-02 PROCEDURE — 96372 THER/PROPH/DIAG INJ SC/IM: CPT

## 2024-06-02 PROCEDURE — 96376 TX/PRO/DX INJ SAME DRUG ADON: CPT

## 2024-06-02 PROCEDURE — 93005 ELECTROCARDIOGRAM TRACING: CPT

## 2024-06-02 PROCEDURE — 6360000004 HC RX CONTRAST MEDICATION: Performed by: STUDENT IN AN ORGANIZED HEALTH CARE EDUCATION/TRAINING PROGRAM

## 2024-06-02 PROCEDURE — 84484 ASSAY OF TROPONIN QUANT: CPT

## 2024-06-02 PROCEDURE — 2580000003 HC RX 258: Performed by: STUDENT IN AN ORGANIZED HEALTH CARE EDUCATION/TRAINING PROGRAM

## 2024-06-02 PROCEDURE — 96361 HYDRATE IV INFUSION ADD-ON: CPT

## 2024-06-02 RX ORDER — SIMETHICONE 80 MG
80 TABLET,CHEWABLE ORAL EVERY 6 HOURS PRN
Status: DISCONTINUED | OUTPATIENT
Start: 2024-06-02 | End: 2024-06-05 | Stop reason: HOSPADM

## 2024-06-02 RX ORDER — MAGNESIUM SULFATE IN WATER 40 MG/ML
2000 INJECTION, SOLUTION INTRAVENOUS PRN
Status: DISCONTINUED | OUTPATIENT
Start: 2024-06-02 | End: 2024-06-05 | Stop reason: HOSPADM

## 2024-06-02 RX ORDER — ONDANSETRON 2 MG/ML
4 INJECTION INTRAMUSCULAR; INTRAVENOUS EVERY 6 HOURS PRN
Status: DISCONTINUED | OUTPATIENT
Start: 2024-06-02 | End: 2024-06-05 | Stop reason: HOSPADM

## 2024-06-02 RX ORDER — ENOXAPARIN SODIUM 100 MG/ML
40 INJECTION SUBCUTANEOUS DAILY
Status: DISCONTINUED | OUTPATIENT
Start: 2024-06-02 | End: 2024-06-05 | Stop reason: HOSPADM

## 2024-06-02 RX ORDER — ACETAMINOPHEN 650 MG/1
650 SUPPOSITORY RECTAL EVERY 6 HOURS PRN
Status: DISCONTINUED | OUTPATIENT
Start: 2024-06-02 | End: 2024-06-05 | Stop reason: HOSPADM

## 2024-06-02 RX ORDER — POLYETHYLENE GLYCOL 3350 17 G/17G
17 POWDER, FOR SOLUTION ORAL DAILY PRN
Status: DISCONTINUED | OUTPATIENT
Start: 2024-06-02 | End: 2024-06-05 | Stop reason: HOSPADM

## 2024-06-02 RX ORDER — METOPROLOL SUCCINATE 50 MG/1
50 TABLET, EXTENDED RELEASE ORAL DAILY
Status: DISCONTINUED | OUTPATIENT
Start: 2024-06-02 | End: 2024-06-05 | Stop reason: HOSPADM

## 2024-06-02 RX ORDER — DIPHENHYDRAMINE HYDROCHLORIDE 50 MG/ML
50 INJECTION INTRAMUSCULAR; INTRAVENOUS ONCE
Status: COMPLETED | OUTPATIENT
Start: 2024-06-02 | End: 2024-06-02

## 2024-06-02 RX ORDER — SODIUM CHLORIDE, SODIUM LACTATE, POTASSIUM CHLORIDE, CALCIUM CHLORIDE 600; 310; 30; 20 MG/100ML; MG/100ML; MG/100ML; MG/100ML
INJECTION, SOLUTION INTRAVENOUS CONTINUOUS
Status: ACTIVE | OUTPATIENT
Start: 2024-06-02 | End: 2024-06-04

## 2024-06-02 RX ORDER — ONDANSETRON 2 MG/ML
4 INJECTION INTRAMUSCULAR; INTRAVENOUS ONCE
Status: COMPLETED | OUTPATIENT
Start: 2024-06-02 | End: 2024-06-02

## 2024-06-02 RX ORDER — ACETAMINOPHEN 325 MG/1
650 TABLET ORAL EVERY 6 HOURS PRN
Status: DISCONTINUED | OUTPATIENT
Start: 2024-06-02 | End: 2024-06-05 | Stop reason: HOSPADM

## 2024-06-02 RX ORDER — PHENOBARBITAL 32.4 MG/1
64.8 TABLET ORAL 2 TIMES DAILY
Status: DISCONTINUED | OUTPATIENT
Start: 2024-06-03 | End: 2024-06-03 | Stop reason: ALTCHOICE

## 2024-06-02 RX ORDER — FOLIC ACID 1 MG/1
1 TABLET ORAL DAILY
Status: DISCONTINUED | OUTPATIENT
Start: 2024-06-02 | End: 2024-06-05 | Stop reason: HOSPADM

## 2024-06-02 RX ORDER — MORPHINE SULFATE 2 MG/ML
2 INJECTION, SOLUTION INTRAMUSCULAR; INTRAVENOUS EVERY 4 HOURS PRN
Status: DISPENSED | OUTPATIENT
Start: 2024-06-02 | End: 2024-06-03

## 2024-06-02 RX ORDER — LORAZEPAM 2 MG/ML
1 INJECTION INTRAMUSCULAR ONCE
Status: COMPLETED | OUTPATIENT
Start: 2024-06-02 | End: 2024-06-02

## 2024-06-02 RX ORDER — POTASSIUM CHLORIDE 7.45 MG/ML
10 INJECTION INTRAVENOUS PRN
Status: DISCONTINUED | OUTPATIENT
Start: 2024-06-02 | End: 2024-06-05 | Stop reason: HOSPADM

## 2024-06-02 RX ORDER — THIAMINE HYDROCHLORIDE 100 MG/ML
100 INJECTION, SOLUTION INTRAMUSCULAR; INTRAVENOUS ONCE
Status: COMPLETED | OUTPATIENT
Start: 2024-06-02 | End: 2024-06-02

## 2024-06-02 RX ORDER — MULTIVITAMIN WITH IRON
1 TABLET ORAL DAILY
Status: DISCONTINUED | OUTPATIENT
Start: 2024-06-02 | End: 2024-06-05 | Stop reason: HOSPADM

## 2024-06-02 RX ORDER — 0.9 % SODIUM CHLORIDE 0.9 %
1000 INTRAVENOUS SOLUTION INTRAVENOUS ONCE
Status: COMPLETED | OUTPATIENT
Start: 2024-06-02 | End: 2024-06-02

## 2024-06-02 RX ORDER — LANOLIN ALCOHOL/MO/W.PET/CERES
100 CREAM (GRAM) TOPICAL DAILY
Status: DISCONTINUED | OUTPATIENT
Start: 2024-06-02 | End: 2024-06-03

## 2024-06-02 RX ORDER — KETOROLAC TROMETHAMINE 30 MG/ML
15 INJECTION, SOLUTION INTRAMUSCULAR; INTRAVENOUS ONCE
Status: COMPLETED | OUTPATIENT
Start: 2024-06-02 | End: 2024-06-02

## 2024-06-02 RX ORDER — PANTOPRAZOLE SODIUM 40 MG/10ML
40 INJECTION, POWDER, LYOPHILIZED, FOR SOLUTION INTRAVENOUS 2 TIMES DAILY
Status: DISCONTINUED | OUTPATIENT
Start: 2024-06-02 | End: 2024-06-03

## 2024-06-02 RX ORDER — ONDANSETRON 4 MG/1
4 TABLET, ORALLY DISINTEGRATING ORAL EVERY 8 HOURS PRN
Status: DISCONTINUED | OUTPATIENT
Start: 2024-06-02 | End: 2024-06-05 | Stop reason: HOSPADM

## 2024-06-02 RX ORDER — POTASSIUM CHLORIDE 20 MEQ/1
40 TABLET, EXTENDED RELEASE ORAL PRN
Status: DISCONTINUED | OUTPATIENT
Start: 2024-06-02 | End: 2024-06-05 | Stop reason: HOSPADM

## 2024-06-02 RX ORDER — PANTOPRAZOLE SODIUM 40 MG/1
40 TABLET, DELAYED RELEASE ORAL
Status: DISCONTINUED | OUTPATIENT
Start: 2024-06-05 | End: 2024-06-03

## 2024-06-02 RX ORDER — PHENOBARBITAL 32.4 MG/1
32.4 TABLET ORAL 2 TIMES DAILY
Status: DISCONTINUED | OUTPATIENT
Start: 2024-06-04 | End: 2024-06-03 | Stop reason: ALTCHOICE

## 2024-06-02 RX ADMIN — THIAMINE HYDROCHLORIDE: 100 INJECTION, SOLUTION INTRAMUSCULAR; INTRAVENOUS at 21:09

## 2024-06-02 RX ADMIN — PANTOPRAZOLE SODIUM 40 MG: 40 INJECTION, POWDER, FOR SOLUTION INTRAVENOUS at 21:05

## 2024-06-02 RX ADMIN — THIAMINE HYDROCHLORIDE 100 MG: 100 INJECTION, SOLUTION INTRAMUSCULAR; INTRAVENOUS at 15:46

## 2024-06-02 RX ADMIN — IOPAMIDOL 80 ML: 755 INJECTION, SOLUTION INTRAVENOUS at 15:02

## 2024-06-02 RX ADMIN — DIPHENHYDRAMINE HYDROCHLORIDE 50 MG: 50 INJECTION INTRAMUSCULAR; INTRAVENOUS at 14:41

## 2024-06-02 RX ADMIN — SODIUM CHLORIDE 1000 ML: 9 INJECTION, SOLUTION INTRAVENOUS at 14:40

## 2024-06-02 RX ADMIN — MORPHINE SULFATE 2 MG: 2 INJECTION, SOLUTION INTRAMUSCULAR; INTRAVENOUS at 21:05

## 2024-06-02 RX ADMIN — KETOROLAC TROMETHAMINE 15 MG: 30 INJECTION, SOLUTION INTRAMUSCULAR at 15:55

## 2024-06-02 RX ADMIN — ONDANSETRON 4 MG: 2 INJECTION INTRAMUSCULAR; INTRAVENOUS at 14:40

## 2024-06-02 RX ADMIN — ENOXAPARIN SODIUM 40 MG: 100 INJECTION SUBCUTANEOUS at 23:23

## 2024-06-02 RX ADMIN — LORAZEPAM 1 MG: 2 INJECTION INTRAMUSCULAR; INTRAVENOUS at 14:41

## 2024-06-02 RX ADMIN — PHENOBARBITAL SODIUM 130 MG: 65 INJECTION INTRAMUSCULAR at 22:00

## 2024-06-02 RX ADMIN — ONDANSETRON 4 MG: 2 INJECTION INTRAMUSCULAR; INTRAVENOUS at 21:04

## 2024-06-02 ASSESSMENT — PAIN SCALES - GENERAL
PAINLEVEL_OUTOF10: 8

## 2024-06-02 ASSESSMENT — PAIN - FUNCTIONAL ASSESSMENT
PAIN_FUNCTIONAL_ASSESSMENT: 0-10

## 2024-06-02 ASSESSMENT — PAIN DESCRIPTION - LOCATION: LOCATION: HEAD

## 2024-06-02 ASSESSMENT — LIFESTYLE VARIABLES
HOW OFTEN DO YOU HAVE A DRINK CONTAINING ALCOHOL: 4 OR MORE TIMES A WEEK
HOW MANY STANDARD DRINKS CONTAINING ALCOHOL DO YOU HAVE ON A TYPICAL DAY: 10 OR MORE

## 2024-06-02 NOTE — ED NOTES
Pt resting in bed, O2 placed back on pt and increased to 4L. Updated on estimated observation time. Pt states he still has a headache. No other needs expressed. VSS

## 2024-06-02 NOTE — ED NOTES
Pt and mother offered [facility] services for addiction resources. Mother would like to hold off for now.

## 2024-06-02 NOTE — ED NOTES
Pt and family updated on admission status. Pt given water and crackers per request. No other needs expressed at this time. VSS

## 2024-06-02 NOTE — ED PROVIDER NOTES
Musculoskeletal:         General: Normal range of motion.      Cervical back: Normal range of motion and neck supple. Tenderness present.   Skin:     General: Skin is warm and dry.      Capillary Refill: Capillary refill takes less than 2 seconds.   Neurological:      General: No focal deficit present.      Mental Status: He is alert. He is disoriented.      Comments: Oriented to self, but not place or time   Psychiatric:         Mood and Affect: Mood normal.         Behavior: Behavior normal.         Thought Content: Thought content normal.       FORMAL DIAGNOSTIC RESULTS     RADIOLOGY: Interpretation per the Radiologist below, if available at the time of this note (none if blank):    CT HEAD WO CONTRAST   Final Result   1. No acute intracranial process. Paranasal sinus disease.   2. Question chronic findings left maxillary sinus with prior maxillary sinus   surgery versus an expansile mass. Clinical correlation recommended.      **This report has been created using voice recognition software.  It may contain   minor errors which are inherent in voice recognition technology.**      CT CERVICAL SPINE WO CONTRAST   Final Result   Degenerative changes. No fracture. Questionable muscle spasm left   side.            **This report has been created using voice recognition software.  It may contain   minor errors which are inherent in voice recognition technology.**            CT ABDOMEN PELVIS W IV CONTRAST Additional Contrast? None   Final Result   Hepatic steatosis. Relatively gassy abdomen. Study otherwise   unremarkable.            **This report has been created using voice recognition software.  It may contain   minor errors which are inherent in voice recognition technology.**                LABS: (none if blank)  Labs Reviewed   CBC WITH AUTO DIFFERENTIAL - Abnormal; Notable for the following components:       Result Value    MCV 97.9 (*)     MCH 33.6 (*)     All other components within normal limits

## 2024-06-02 NOTE — ED NOTES
Pt resting in bed with eyes closed, O2 noted to be 86% on RA. Pt placed on 2L NC. Pt requesting pain meds for headache, provider made aware. Mother remains at bedside. VSS

## 2024-06-02 NOTE — ED NOTES
ED to inpatient nurses report      Chief Complaint:  Chief Complaint   Patient presents with    Alcohol Problem     Present to ED from: home    MOA:     LOC: alert and orientated to name, place, date  Mobility: Requires assistance * 1  Oxygen Baseline: RA    Current needs required: 2L     Code Status:   Prior    What abnormal results were found and what did you give/do to treat them? Alcohol detox-see MAR  Any procedures or intervention occur? none    Mental Status:  Level of Consciousness: Alert (0)    Psych Assessment:        Vitals:  Patient Vitals for the past 24 hrs:   BP Temp Temp src Pulse Resp SpO2 Height Weight   06/02/24 1750 115/88 -- -- (!) 114 17 99 % -- --   06/02/24 1656 100/69 -- -- (!) 113 18 100 % -- --   06/02/24 1548 -- -- -- -- -- 98 % -- --   06/02/24 1547 113/87 -- -- (!) 112 16 (!) 86 % -- --   06/02/24 1545 113/87 -- -- (!) 112 -- -- -- --   06/02/24 1427 (!) 139/108 98.9 °F (37.2 °C) Oral (!) 106 13 99 % 1.778 m (5' 10\") 99.8 kg (220 lb)        LDAs:   Peripheral IV 06/02/24 Right Antecubital (Active)   Site Assessment Clean, dry & intact 06/02/24 1752   Line Status Normal saline locked 06/02/24 1752   Phlebitis Assessment No symptoms 06/02/24 1752   Infiltration Assessment 0 06/02/24 1752   Dressing Status Clean, dry & intact 06/02/24 1752   Dressing Type Transparent 06/02/24 1435   Dressing Intervention New 06/02/24 1435       Ambulatory Status:  No data recorded    Diagnosis:  DISPOSITION Decision To Admit 06/02/2024 06:30:55 PM   Final diagnoses:   Acute alcoholic intoxication without complication (HCC)        Consults:  None     Pain Score:  Pain Assessment  Pain Assessment: 0-10  Pain Level: 8  Patient's Stated Pain Goal: 4  Pain Location: Head    C-SSRS:        Sepsis Screening:  Sepsis Risk Score: 1.76    [place] Fall Risk:       Swallow Screening        Preferred Language:   English      ALLERGIES     Fentanyl    SURGICAL HISTORY       Past Surgical History:   Procedure Laterality

## 2024-06-02 NOTE — H&P
is a 49 y/o  male with a PMHx of HTN, alcohol abuse who presents to [facility] ED today for the evaluation of headache. He says it's constant and severe. Pt provides very limited history and is very tearful on exam. Pt otherwise at this time reports significant epigastric and RUQ pain. He reports decreased appetite over the last several days and significantly decreased PO intake. He states the pain in his stomach radiates into his chest. Pt reports he drinks a liter of vodka per day. He endorses nausea, tremors at this time. States he has had frequent falls as of late and mother states she is unsure if he has fallen recently.  Mother at bedside reports she received a call from him yesterday due to the headache.  Mother reports significant history of depression and concerned his current medications are not helping any more.  Per chart review, mother found him at his home today drunk with many empty bottles around him today. Pt otherwise denies fever, chills, shortness of breath. He denies thoughts of harming himself or others.      Past Medical History:        Diagnosis Date    Alcohol dependence (HCC)     Arthritis     GERD (gastroesophageal reflux disease)     Hyperlipidemia     Hypertension     Liver disease     Panic attacks     Pneumonia     Psychiatric problem     major depressive disorder    Scoliosis        Past Surgical History:        Procedure Laterality Date    BACK SURGERY      x2    CARDIAC SURGERY      cardiac cath    COLONOSCOPY      endoscopy and colonoscopy    EKG 12-LEAD  04/14/2015            Home Medications:   No current facility-administered medications on file prior to encounter.     Current Outpatient Medications on File Prior to Encounter   Medication Sig Dispense Refill    metoprolol succinate (TOPROL XL) 100 MG extended release tablet Take 1 tablet by mouth daily 90 tablet 0    fluticasone (FLONASE) 50 MCG/ACT nasal spray 2 sprays by Each Nostril route daily 16 g 0    ibuprofen

## 2024-06-02 NOTE — ED TRIAGE NOTES
Pt presents to the ER with mother for c/o an alcohol problem. Mother states pt has been heavily drinking for ~20 years and has detoxed in the past. Estimates today he has drank ~2L of vodka. Upon arrival, pt is c/o a headache and appears tearful. Mother remains at bedside. Pt is alert, VSS

## 2024-06-03 ENCOUNTER — APPOINTMENT (OUTPATIENT)
Dept: ULTRASOUND IMAGING | Age: 49
End: 2024-06-03
Payer: MEDICAID

## 2024-06-03 PROBLEM — F10.929 ACUTE ALCOHOL INTOXICATION, WITH UNSPECIFIED COMPLICATION (HCC): Status: ACTIVE | Noted: 2024-06-03

## 2024-06-03 LAB
ALBUMIN SERPL BCG-MCNC: 4 G/DL (ref 3.5–5.1)
ALP SERPL-CCNC: 116 U/L (ref 38–126)
ALT SERPL W/O P-5'-P-CCNC: 21 U/L (ref 11–66)
AMMONIA PLAS-MCNC: 25 UMOL/L (ref 11–60)
ANION GAP SERPL CALC-SCNC: 15 MEQ/L (ref 8–16)
AST SERPL-CCNC: 38 U/L (ref 5–40)
BASOPHILS ABSOLUTE: 0 THOU/MM3 (ref 0–0.1)
BASOPHILS NFR BLD AUTO: 0.6 %
BILIRUB SERPL-MCNC: 0.6 MG/DL (ref 0.3–1.2)
BUN SERPL-MCNC: 12 MG/DL (ref 7–22)
CA-I BLD ISE-SCNC: 1.09 MMOL/L (ref 1.12–1.32)
CALCIUM SERPL-MCNC: 8.9 MG/DL (ref 8.5–10.5)
CHLORIDE SERPL-SCNC: 101 MEQ/L (ref 98–111)
CO2 SERPL-SCNC: 25 MEQ/L (ref 23–33)
CREAT SERPL-MCNC: 0.9 MG/DL (ref 0.4–1.2)
DEPRECATED RDW RBC AUTO: 44.4 FL (ref 35–45)
EKG ATRIAL RATE: 114 BPM
EKG P AXIS: 47 DEGREES
EKG P-R INTERVAL: 136 MS
EKG Q-T INTERVAL: 342 MS
EKG QRS DURATION: 80 MS
EKG QTC CALCULATION (BAZETT): 471 MS
EKG R AXIS: 82 DEGREES
EKG T AXIS: 38 DEGREES
EKG VENTRICULAR RATE: 114 BPM
EOSINOPHIL NFR BLD AUTO: 0.9 %
EOSINOPHILS ABSOLUTE: 0.1 THOU/MM3 (ref 0–0.4)
ERYTHROCYTE [DISTWIDTH] IN BLOOD BY AUTOMATED COUNT: 12 % (ref 11.5–14.5)
GFR SERPL CREATININE-BSD FRML MDRD: > 90 ML/MIN/1.73M2
GLUCOSE SERPL-MCNC: 97 MG/DL (ref 70–108)
HCT VFR BLD AUTO: 41.2 % (ref 42–52)
HGB BLD-MCNC: 14.2 GM/DL (ref 14–18)
IMM GRANULOCYTES # BLD AUTO: 0.01 THOU/MM3 (ref 0–0.07)
IMM GRANULOCYTES NFR BLD AUTO: 0.2 %
LYMPHOCYTES ABSOLUTE: 1.2 THOU/MM3 (ref 1–4.8)
LYMPHOCYTES NFR BLD AUTO: 18.3 %
MCH RBC QN AUTO: 34.4 PG (ref 26–33)
MCHC RBC AUTO-ENTMCNC: 34.5 GM/DL (ref 32.2–35.5)
MCV RBC AUTO: 99.8 FL (ref 80–94)
MONOCYTES ABSOLUTE: 0.6 THOU/MM3 (ref 0.4–1.3)
MONOCYTES NFR BLD AUTO: 9.9 %
NEUTROPHILS ABSOLUTE: 4.6 THOU/MM3 (ref 1.8–7.7)
NEUTROPHILS NFR BLD AUTO: 70.1 %
NRBC BLD AUTO-RTO: 0 /100 WBC
OSMOLALITY SERPL CALC.SUM OF ELEC: 280.9 MOSMOL/KG (ref 275–300)
PHOSPHATE SERPL-MCNC: 2.6 MG/DL (ref 2.4–4.7)
PLATELET # BLD AUTO: 328 THOU/MM3 (ref 130–400)
PMV BLD AUTO: 10 FL (ref 9.4–12.4)
POTASSIUM SERPL-SCNC: 4.3 MEQ/L (ref 3.5–5.2)
PROT SERPL-MCNC: 7.2 G/DL (ref 6.1–8)
RBC # BLD AUTO: 4.13 MILL/MM3 (ref 4.7–6.1)
SODIUM SERPL-SCNC: 141 MEQ/L (ref 135–145)
WBC # BLD AUTO: 6.5 THOU/MM3 (ref 4.8–10.8)

## 2024-06-03 PROCEDURE — 6370000000 HC RX 637 (ALT 250 FOR IP)

## 2024-06-03 PROCEDURE — 93010 ELECTROCARDIOGRAM REPORT: CPT | Performed by: INTERNAL MEDICINE

## 2024-06-03 PROCEDURE — 96375 TX/PRO/DX INJ NEW DRUG ADDON: CPT

## 2024-06-03 PROCEDURE — 2140000000 HC CCU INTERMEDIATE R&B

## 2024-06-03 PROCEDURE — 2580000003 HC RX 258

## 2024-06-03 PROCEDURE — 84425 ASSAY OF VITAMIN B-1: CPT

## 2024-06-03 PROCEDURE — 96366 THER/PROPH/DIAG IV INF ADDON: CPT

## 2024-06-03 PROCEDURE — 36415 COLL VENOUS BLD VENIPUNCTURE: CPT

## 2024-06-03 PROCEDURE — 96372 THER/PROPH/DIAG INJ SC/IM: CPT

## 2024-06-03 PROCEDURE — 6360000002 HC RX W HCPCS

## 2024-06-03 PROCEDURE — 82140 ASSAY OF AMMONIA: CPT

## 2024-06-03 PROCEDURE — 84100 ASSAY OF PHOSPHORUS: CPT

## 2024-06-03 PROCEDURE — C9113 INJ PANTOPRAZOLE SODIUM, VIA: HCPCS

## 2024-06-03 PROCEDURE — 76705 ECHO EXAM OF ABDOMEN: CPT

## 2024-06-03 PROCEDURE — 96361 HYDRATE IV INFUSION ADD-ON: CPT

## 2024-06-03 PROCEDURE — 82330 ASSAY OF CALCIUM: CPT

## 2024-06-03 PROCEDURE — 6360000002 HC RX W HCPCS: Performed by: PHYSICIAN ASSISTANT

## 2024-06-03 PROCEDURE — 85025 COMPLETE CBC W/AUTO DIFF WBC: CPT

## 2024-06-03 PROCEDURE — 80053 COMPREHEN METABOLIC PANEL: CPT

## 2024-06-03 PROCEDURE — 96376 TX/PRO/DX INJ SAME DRUG ADON: CPT

## 2024-06-03 PROCEDURE — 96368 THER/DIAG CONCURRENT INF: CPT

## 2024-06-03 PROCEDURE — 99232 SBSQ HOSP IP/OBS MODERATE 35: CPT

## 2024-06-03 RX ORDER — PANTOPRAZOLE SODIUM 40 MG/1
40 TABLET, DELAYED RELEASE ORAL
Status: DISCONTINUED | OUTPATIENT
Start: 2024-06-03 | End: 2024-06-05 | Stop reason: HOSPADM

## 2024-06-03 RX ORDER — PHENOBARBITAL SODIUM 65 MG/ML
260 INJECTION, SOLUTION INTRAMUSCULAR; INTRAVENOUS
Status: DISCONTINUED | OUTPATIENT
Start: 2024-06-03 | End: 2024-06-05 | Stop reason: HOSPADM

## 2024-06-03 RX ORDER — CALCIUM GLUCONATE 20 MG/ML
2000 INJECTION, SOLUTION INTRAVENOUS PRN
Status: DISCONTINUED | OUTPATIENT
Start: 2024-06-03 | End: 2024-06-05 | Stop reason: HOSPADM

## 2024-06-03 RX ORDER — HYDRALAZINE HYDROCHLORIDE 20 MG/ML
5 INJECTION INTRAMUSCULAR; INTRAVENOUS EVERY 6 HOURS PRN
Status: DISCONTINUED | OUTPATIENT
Start: 2024-06-03 | End: 2024-06-05 | Stop reason: HOSPADM

## 2024-06-03 RX ORDER — SODIUM CHLORIDE, SODIUM LACTATE, POTASSIUM CHLORIDE, AND CALCIUM CHLORIDE .6; .31; .03; .02 G/100ML; G/100ML; G/100ML; G/100ML
1000 INJECTION, SOLUTION INTRAVENOUS ONCE
Status: DISCONTINUED | OUTPATIENT
Start: 2024-06-03 | End: 2024-06-03

## 2024-06-03 RX ORDER — MORPHINE SULFATE 2 MG/ML
2 INJECTION, SOLUTION INTRAMUSCULAR; INTRAVENOUS EVERY 4 HOURS PRN
Status: DISCONTINUED | OUTPATIENT
Start: 2024-06-03 | End: 2024-06-04

## 2024-06-03 RX ORDER — THIAMINE HYDROCHLORIDE 100 MG/ML
250 INJECTION, SOLUTION INTRAMUSCULAR; INTRAVENOUS DAILY
Status: DISCONTINUED | OUTPATIENT
Start: 2024-06-06 | End: 2024-06-03

## 2024-06-03 RX ORDER — PHENOBARBITAL SODIUM 65 MG/ML
130 INJECTION, SOLUTION INTRAMUSCULAR; INTRAVENOUS
Status: DISCONTINUED | OUTPATIENT
Start: 2024-06-03 | End: 2024-06-05 | Stop reason: HOSPADM

## 2024-06-03 RX ORDER — THIAMINE HYDROCHLORIDE 100 MG/ML
250 INJECTION, SOLUTION INTRAMUSCULAR; INTRAVENOUS DAILY
Status: CANCELLED | OUTPATIENT
Start: 2024-06-06 | End: 2024-06-11

## 2024-06-03 RX ADMIN — SODIUM CHLORIDE, POTASSIUM CHLORIDE, SODIUM LACTATE AND CALCIUM CHLORIDE: 600; 310; 30; 20 INJECTION, SOLUTION INTRAVENOUS at 07:13

## 2024-06-03 RX ADMIN — Medication 100 MG: at 10:01

## 2024-06-03 RX ADMIN — PHENOBARBITAL SODIUM 260 MG: 65 INJECTION INTRAMUSCULAR at 08:25

## 2024-06-03 RX ADMIN — SIMETHICONE 80 MG: 80 TABLET, CHEWABLE ORAL at 17:56

## 2024-06-03 RX ADMIN — PHENOBARBITAL SODIUM 130 MG: 65 INJECTION INTRAMUSCULAR at 23:39

## 2024-06-03 RX ADMIN — METOPROLOL SUCCINATE 50 MG: 50 TABLET, EXTENDED RELEASE ORAL at 10:01

## 2024-06-03 RX ADMIN — ONDANSETRON 4 MG: 2 INJECTION INTRAMUSCULAR; INTRAVENOUS at 21:06

## 2024-06-03 RX ADMIN — FOLIC ACID 1 MG: 1 TABLET ORAL at 10:01

## 2024-06-03 RX ADMIN — Medication 1 TABLET: at 10:23

## 2024-06-03 RX ADMIN — ACETAMINOPHEN 650 MG: 325 TABLET ORAL at 01:59

## 2024-06-03 RX ADMIN — ONDANSETRON 4 MG: 4 TABLET, ORALLY DISINTEGRATING ORAL at 08:25

## 2024-06-03 RX ADMIN — PHENOBARBITAL SODIUM 260 MG: 65 INJECTION INTRAMUSCULAR at 12:28

## 2024-06-03 RX ADMIN — MORPHINE SULFATE 2 MG: 2 INJECTION, SOLUTION INTRAMUSCULAR; INTRAVENOUS at 20:28

## 2024-06-03 RX ADMIN — ACETAMINOPHEN 650 MG: 325 TABLET ORAL at 08:25

## 2024-06-03 RX ADMIN — MORPHINE SULFATE 2 MG: 2 INJECTION, SOLUTION INTRAMUSCULAR; INTRAVENOUS at 16:48

## 2024-06-03 RX ADMIN — ENOXAPARIN SODIUM 40 MG: 100 INJECTION SUBCUTANEOUS at 10:00

## 2024-06-03 RX ADMIN — PHENOBARBITAL SODIUM 260 MG: 65 INJECTION INTRAMUSCULAR at 16:48

## 2024-06-03 RX ADMIN — SODIUM CHLORIDE, POTASSIUM CHLORIDE, SODIUM LACTATE AND CALCIUM CHLORIDE: 600; 310; 30; 20 INJECTION, SOLUTION INTRAVENOUS at 15:18

## 2024-06-03 RX ADMIN — PANTOPRAZOLE SODIUM 40 MG: 40 INJECTION, POWDER, FOR SOLUTION INTRAVENOUS at 10:00

## 2024-06-03 RX ADMIN — PHENOBARBITAL SODIUM 260 MG: 65 INJECTION INTRAMUSCULAR at 05:11

## 2024-06-03 RX ADMIN — HYDRALAZINE HYDROCHLORIDE 5 MG: 20 INJECTION INTRAMUSCULAR; INTRAVENOUS at 15:15

## 2024-06-03 RX ADMIN — MORPHINE SULFATE 2 MG: 2 INJECTION, SOLUTION INTRAMUSCULAR; INTRAVENOUS at 08:25

## 2024-06-03 RX ADMIN — ESCITALOPRAM OXALATE 30 MG: 20 TABLET ORAL at 10:01

## 2024-06-03 RX ADMIN — PHENOBARBITAL SODIUM 130 MG: 65 INJECTION INTRAMUSCULAR at 01:56

## 2024-06-03 RX ADMIN — PANTOPRAZOLE SODIUM 40 MG: 40 TABLET, DELAYED RELEASE ORAL at 16:49

## 2024-06-03 RX ADMIN — SIMETHICONE 80 MG: 80 TABLET, CHEWABLE ORAL at 12:29

## 2024-06-03 RX ADMIN — HYDRALAZINE HYDROCHLORIDE 5 MG: 20 INJECTION INTRAMUSCULAR; INTRAVENOUS at 23:38

## 2024-06-03 ASSESSMENT — PAIN SCALES - GENERAL
PAINLEVEL_OUTOF10: 8
PAINLEVEL_OUTOF10: 8
PAINLEVEL_OUTOF10: 6
PAINLEVEL_OUTOF10: 0
PAINLEVEL_OUTOF10: 5
PAINLEVEL_OUTOF10: 8
PAINLEVEL_OUTOF10: 10
PAINLEVEL_OUTOF10: 0
PAINLEVEL_OUTOF10: 6
PAINLEVEL_OUTOF10: 7
PAINLEVEL_OUTOF10: 5
PAINLEVEL_OUTOF10: 8
PAINLEVEL_OUTOF10: 6

## 2024-06-03 ASSESSMENT — PAIN DESCRIPTION - LOCATION
LOCATION: ABDOMEN
LOCATION: ABDOMEN
LOCATION: HEAD
LOCATION: ABDOMEN

## 2024-06-03 ASSESSMENT — PATIENT HEALTH QUESTIONNAIRE - PHQ9
2. FEELING DOWN, DEPRESSED OR HOPELESS: NOT AT ALL
SUM OF ALL RESPONSES TO PHQ9 QUESTIONS 1 & 2: 0
SUM OF ALL RESPONSES TO PHQ QUESTIONS 1-9: 0
1. LITTLE INTEREST OR PLEASURE IN DOING THINGS: NOT AT ALL
SUM OF ALL RESPONSES TO PHQ QUESTIONS 1-9: 0

## 2024-06-03 ASSESSMENT — PAIN DESCRIPTION - ORIENTATION
ORIENTATION: RIGHT
ORIENTATION: RIGHT

## 2024-06-03 ASSESSMENT — PAIN DESCRIPTION - DESCRIPTORS
DESCRIPTORS: TEARING
DESCRIPTORS: SHARP

## 2024-06-03 NOTE — ED NOTES
Pt reports decrease in DT symptoms at this time stating phenobarb \"helped a lot\". Updated pt on plan of care. Voiced no needs. Call light in reach.

## 2024-06-03 NOTE — ED NOTES
Pt transported to 3A10 by cart in stable condition.   Called 3A and informed [place] that the patient was on their way to the unit.

## 2024-06-03 NOTE — ED NOTES
Utilize [facility] alcohol withdrawal scale (Based on Gertchen Modified Alcohol Withdrawal Scale).  Tabulate score based on classifications including Tremor, Sweating, Hallucination, Orientation, and Agitation.    Tremor: 0  Sweatin  Hallucinations: 0  Orientation: 0  Agitation: 0  Total Score: 0  Action perform as described below     Tremor:  No tremor is 0 points.  Tremor on movement is 1 point.  Tremor at rest is 2 points.  Sweating: No Sweat 0 points. Moist is 1 point.  Drenching sweats is 2 points.  Hallucinations: No present 0 points. Dissuadable is 1 point. Not dissuadable is 2 points.  Orientation: Oriented 0 points. Vague/detached 1 point. Disoriented/no contact 2 points.  Agitation: Calm 0 points.  Anxious 1 point. Panicky 2 points.    Check scale every 2 hours.  Discontinue scoring with 4 consecutive scorings of 0.  Scale 0: No phenobarbital given.  Re-assess every 30 minutes as needed.   Scale 1-3: Phenobarbital 130 mg IV over 3 minutes. Re-assess every 30 minutes as needed.  May administer every 30 minutes to a maximum dose of phenobarbital 1040 mg in 24 hours!  Scale 4-8: Phenobarbital 260 mg IV over 5 minutes.  Re-assess every 30 minutes as needed. May administer every 30 minutes to a maximum dose of phenobarbital 1040mg in 24 hours!  Scale 9-10: Transfer to ICU (if not already in ICU).  Administer 10mg/kg phenobarbital IV over 30 minutes.  Maximum dose phenobarbital is 1040mg in 24 hours!

## 2024-06-03 NOTE — ED NOTES
Cold washcloth placed on pt's head. Pt updated on plan of care. Voiced no needs. Call light in reach.

## 2024-06-03 NOTE — ED NOTES
Utilize [facility] alcohol withdrawal scale (Based on Gretchen Modified Alcohol Withdrawal Scale).  Tabulate score based on classifications including Tremor, Sweating, Hallucination, Orientation, and Agitation.    Tremor: 2  Sweatin  Hallucinations: 0  Orientation: 0  Agitation: 0  Total Score: 4  Action perform as described below     Tremor:  No tremor is 0 points.  Tremor on movement is 1 point.  Tremor at rest is 2 points.  Sweating: No Sweat 0 points. Moist is 1 point.  Drenching sweats is 2 points.  Hallucinations: No present 0 points. Dissuadable is 1 point. Not dissuadable is 2 points.  Orientation: Oriented 0 points. Vague/detached 1 point. Disoriented/no contact 2 points.  Agitation: Calm 0 points.  Anxious 1 point. Panicky 2 points.    Check scale every 2 hours.  Discontinue scoring with 4 consecutive scorings of 0.  Scale 0: No phenobarbital given.  Re-assess every 30 minutes as needed.   Scale 1-3: Phenobarbital 130 mg IV over 3 minutes. Re-assess every 30 minutes as needed.  May administer every 30 minutes to a maximum dose of phenobarbital 1040 mg in 24 hours!  Scale 4-8: Phenobarbital 260 mg IV over 5 minutes.  Re-assess every 30 minutes as needed. May administer every 30 minutes to a maximum dose of phenobarbital 1040mg in 24 hours!  Scale 9-10: Transfer to ICU (if not already in ICU).  Administer 10mg/kg phenobarbital IV over 30 minutes.  Maximum dose phenobarbital is 1040mg in 24 hours!

## 2024-06-03 NOTE — ED NOTES
Utilize [facility] alcohol withdrawal scale (Based on Gretchen Modified Alcohol Withdrawal Scale).  Tabulate score based on classifications including Tremor, Sweating, Hallucination, Orientation, and Agitation.    Tremor: 1  Sweatin  Hallucinations: 0  Orientation: 0  Agitation: 0  Total Score: 2  Action perform as described below     Tremor:  No tremor is 0 points.  Tremor on movement is 1 point.  Tremor at rest is 2 points.  Sweating: No Sweat 0 points. Moist is 1 point.  Drenching sweats is 2 points.  Hallucinations: No present 0 points. Dissuadable is 1 point. Not dissuadable is 2 points.  Orientation: Oriented 0 points. Vague/detached 1 point. Disoriented/no contact 2 points.  Agitation: Calm 0 points.  Anxious 1 point. Panicky 2 points.    Check scale every 2 hours.  Discontinue scoring with 4 consecutive scorings of 0.  Scale 0: No phenobarbital given.  Re-assess every 30 minutes as needed.   Scale 1-3: Phenobarbital 130 mg IV over 3 minutes. Re-assess every 30 minutes as needed.  May administer every 30 minutes to a maximum dose of phenobarbital 1040 mg in 24 hours!  Scale 4-8: Phenobarbital 260 mg IV over 5 minutes.  Re-assess every 30 minutes as needed. May administer every 30 minutes to a maximum dose of phenobarbital 1040mg in 24 hours!  Scale 9-10: Transfer to ICU (if not already in ICU).  Administer 10mg/kg phenobarbital IV over 30 minutes.  Maximum dose phenobarbital is 1040mg in 24 hours!

## 2024-06-03 NOTE — ED NOTES
Patient resting in bed. Updated on plan of care. States that he is having some abdominal cramping and thinks it might be gas. Denies any needs at this time. Call light in reach.

## 2024-06-03 NOTE — ED NOTES
Utilize [facility] alcohol withdrawal scale (Based on Gretchen Modified Alcohol Withdrawal Scale).  Tabulate score based on classifications including Tremor, Sweating, Hallucination, Orientation, and Agitation.    Tremor: 0  Sweatin  Hallucinations: 0  Orientation: 0  Agitation: 0  Total Score: 0  Action perform as described below     Tremor:  No tremor is 0 points.  Tremor on movement is 1 point.  Tremor at rest is 2 points.  Sweating: No Sweat 0 points. Moist is 1 point.  Drenching sweats is 2 points.  Hallucinations: No present 0 points. Dissuadable is 1 point. Not dissuadable is 2 points.  Orientation: Oriented 0 points. Vague/detached 1 point. Disoriented/no contact 2 points.  Agitation: Calm 0 points.  Anxious 1 point. Panicky 2 points.    Check scale every 2 hours.  Discontinue scoring with 4 consecutive scorings of 0.  Scale 0: No phenobarbital given.  Re-assess every 60 minutes as needed.   Scale 1-3: Phenobarbital 130 mg IV over 3 minutes. Re-assess every 60 minutes as needed.  May administer every 60 minutes to a maximum dose of phenobarbital 1040 mg in 24 hours!  Scale 4-8: Phenobarbital 260 mg IV over 5 minutes.  Re-assess every 60 minutes as needed. May administer every 60 minutes to a maximum dose of phenobarbital 1040mg in 24 hours!  Scale 9-10: Transfer to ICU (if not already in ICU).  Administer 10mg/kg phenobarbital IV over 60 minutes.  Maximum dose phenobarbital is 1040mg in 24 hours!

## 2024-06-03 NOTE — ED NOTES
Pt medicated per MAR. CIWA reassessed. No needs expressed at this time. Respirations even and unlabored, call light within reach. VSS

## 2024-06-03 NOTE — ED NOTES
No tremors noted at this time. Pt is no longer diaphoretic. Pt updated on plan of care. Voiced no needs. Call light in reach.

## 2024-06-03 NOTE — ED NOTES
Pt in bed sleeping at this time with no s/s of distress noted. Wakes to voice. Updated on plan of care. Voiced no needs. Call light in reach.

## 2024-06-03 NOTE — ED NOTES
Pt in bed sleeping when this nurse entered room. Wakes to voice. Updated on plan of care. Voiced no needs. Call light in reach.

## 2024-06-03 NOTE — ED NOTES
Pt in bed resting with eyes closed at this time. Wakes to voice.  Updated on plan of care. Voiced no needs. Call light in reach.

## 2024-06-04 LAB
ALBUMIN SERPL BCG-MCNC: 3.7 G/DL (ref 3.5–5.1)
ALP SERPL-CCNC: 108 U/L (ref 38–126)
ALT SERPL W/O P-5'-P-CCNC: 19 U/L (ref 11–66)
ANION GAP SERPL CALC-SCNC: 12 MEQ/L (ref 8–16)
AST SERPL-CCNC: 26 U/L (ref 5–40)
BASOPHILS ABSOLUTE: 0 THOU/MM3 (ref 0–0.1)
BASOPHILS NFR BLD AUTO: 0.3 %
BILIRUB SERPL-MCNC: 0.6 MG/DL (ref 0.3–1.2)
BUN SERPL-MCNC: 8 MG/DL (ref 7–22)
CALCIUM SERPL-MCNC: 8.8 MG/DL (ref 8.5–10.5)
CHLORIDE SERPL-SCNC: 97 MEQ/L (ref 98–111)
CO2 SERPL-SCNC: 28 MEQ/L (ref 23–33)
CREAT SERPL-MCNC: 0.9 MG/DL (ref 0.4–1.2)
DEPRECATED RDW RBC AUTO: 43.9 FL (ref 35–45)
EOSINOPHIL NFR BLD AUTO: 1.7 %
EOSINOPHILS ABSOLUTE: 0.1 THOU/MM3 (ref 0–0.4)
ERYTHROCYTE [DISTWIDTH] IN BLOOD BY AUTOMATED COUNT: 11.9 % (ref 11.5–14.5)
GFR SERPL CREATININE-BSD FRML MDRD: > 90 ML/MIN/1.73M2
GLUCOSE SERPL-MCNC: 97 MG/DL (ref 70–108)
HCT VFR BLD AUTO: 38.8 % (ref 42–52)
HGB BLD-MCNC: 13.3 GM/DL (ref 14–18)
IMM GRANULOCYTES # BLD AUTO: 0.02 THOU/MM3 (ref 0–0.07)
IMM GRANULOCYTES NFR BLD AUTO: 0.3 %
LYMPHOCYTES ABSOLUTE: 1.1 THOU/MM3 (ref 1–4.8)
LYMPHOCYTES NFR BLD AUTO: 19 %
MCH RBC QN AUTO: 34.5 PG (ref 26–33)
MCHC RBC AUTO-ENTMCNC: 34.3 GM/DL (ref 32.2–35.5)
MCV RBC AUTO: 100.8 FL (ref 80–94)
MONOCYTES ABSOLUTE: 0.6 THOU/MM3 (ref 0.4–1.3)
MONOCYTES NFR BLD AUTO: 9.7 %
NEUTROPHILS ABSOLUTE: 4.1 THOU/MM3 (ref 1.8–7.7)
NEUTROPHILS NFR BLD AUTO: 69 %
NRBC BLD AUTO-RTO: 0 /100 WBC
PLATELET # BLD AUTO: 274 THOU/MM3 (ref 130–400)
PMV BLD AUTO: 10.2 FL (ref 9.4–12.4)
POTASSIUM SERPL-SCNC: 4 MEQ/L (ref 3.5–5.2)
PROT SERPL-MCNC: 6.5 G/DL (ref 6.1–8)
RBC # BLD AUTO: 3.85 MILL/MM3 (ref 4.7–6.1)
SODIUM SERPL-SCNC: 137 MEQ/L (ref 135–145)
WBC # BLD AUTO: 5.9 THOU/MM3 (ref 4.8–10.8)

## 2024-06-04 PROCEDURE — 6370000000 HC RX 637 (ALT 250 FOR IP)

## 2024-06-04 PROCEDURE — 85025 COMPLETE CBC W/AUTO DIFF WBC: CPT

## 2024-06-04 PROCEDURE — 99232 SBSQ HOSP IP/OBS MODERATE 35: CPT

## 2024-06-04 PROCEDURE — 6360000002 HC RX W HCPCS: Performed by: PHYSICIAN ASSISTANT

## 2024-06-04 PROCEDURE — 80053 COMPREHEN METABOLIC PANEL: CPT

## 2024-06-04 PROCEDURE — 36415 COLL VENOUS BLD VENIPUNCTURE: CPT

## 2024-06-04 PROCEDURE — 2140000000 HC CCU INTERMEDIATE R&B

## 2024-06-04 PROCEDURE — 6360000002 HC RX W HCPCS

## 2024-06-04 PROCEDURE — 2580000003 HC RX 258

## 2024-06-04 RX ORDER — OXYCODONE HYDROCHLORIDE AND ACETAMINOPHEN 5; 325 MG/1; MG/1
2 TABLET ORAL EVERY 8 HOURS PRN
Status: DISCONTINUED | OUTPATIENT
Start: 2024-06-04 | End: 2024-06-05 | Stop reason: HOSPADM

## 2024-06-04 RX ORDER — LANOLIN ALCOHOL/MO/W.PET/CERES
100 CREAM (GRAM) TOPICAL DAILY
Status: DISCONTINUED | OUTPATIENT
Start: 2024-06-04 | End: 2024-06-05 | Stop reason: HOSPADM

## 2024-06-04 RX ORDER — OXYCODONE HYDROCHLORIDE AND ACETAMINOPHEN 5; 325 MG/1; MG/1
1 TABLET ORAL EVERY 8 HOURS PRN
Status: DISCONTINUED | OUTPATIENT
Start: 2024-06-04 | End: 2024-06-05 | Stop reason: HOSPADM

## 2024-06-04 RX ORDER — SUCRALFATE 1 G/1
1 TABLET ORAL
Status: DISCONTINUED | OUTPATIENT
Start: 2024-06-04 | End: 2024-06-05 | Stop reason: HOSPADM

## 2024-06-04 RX ORDER — DICYCLOMINE HYDROCHLORIDE 10 MG/1
20 CAPSULE ORAL 3 TIMES DAILY PRN
Status: DISCONTINUED | OUTPATIENT
Start: 2024-06-04 | End: 2024-06-05 | Stop reason: HOSPADM

## 2024-06-04 RX ORDER — KETOROLAC TROMETHAMINE 30 MG/ML
15 INJECTION, SOLUTION INTRAMUSCULAR; INTRAVENOUS EVERY 6 HOURS PRN
Status: DISCONTINUED | OUTPATIENT
Start: 2024-06-04 | End: 2024-06-05 | Stop reason: HOSPADM

## 2024-06-04 RX ORDER — SENNOSIDES A AND B 8.6 MG/1
1 TABLET, FILM COATED ORAL NIGHTLY
Status: DISCONTINUED | OUTPATIENT
Start: 2024-06-04 | End: 2024-06-05 | Stop reason: HOSPADM

## 2024-06-04 RX ADMIN — METOPROLOL SUCCINATE 50 MG: 50 TABLET, EXTENDED RELEASE ORAL at 09:02

## 2024-06-04 RX ADMIN — SENNOSIDES 8.6 MG: 8.6 TABLET, FILM COATED ORAL at 20:18

## 2024-06-04 RX ADMIN — OXYCODONE HYDROCHLORIDE AND ACETAMINOPHEN 2 TABLET: 5; 325 TABLET ORAL at 09:08

## 2024-06-04 RX ADMIN — Medication 100 MG: at 15:27

## 2024-06-04 RX ADMIN — SUCRALFATE 1 G: 1 TABLET ORAL at 17:03

## 2024-06-04 RX ADMIN — KETOROLAC TROMETHAMINE 15 MG: 30 INJECTION, SOLUTION INTRAMUSCULAR at 12:51

## 2024-06-04 RX ADMIN — DICYCLOMINE HYDROCHLORIDE 20 MG: 10 CAPSULE ORAL at 12:53

## 2024-06-04 RX ADMIN — Medication 1 TABLET: at 09:02

## 2024-06-04 RX ADMIN — PANTOPRAZOLE SODIUM 40 MG: 40 TABLET, DELAYED RELEASE ORAL at 17:03

## 2024-06-04 RX ADMIN — PHENOBARBITAL SODIUM 130 MG: 65 INJECTION INTRAMUSCULAR at 09:11

## 2024-06-04 RX ADMIN — PANTOPRAZOLE SODIUM 40 MG: 40 TABLET, DELAYED RELEASE ORAL at 08:58

## 2024-06-04 RX ADMIN — ENOXAPARIN SODIUM 40 MG: 100 INJECTION SUBCUTANEOUS at 08:59

## 2024-06-04 RX ADMIN — FOLIC ACID 1 MG: 1 TABLET ORAL at 09:09

## 2024-06-04 RX ADMIN — SUCRALFATE 1 G: 1 TABLET ORAL at 09:03

## 2024-06-04 RX ADMIN — OXYCODONE HYDROCHLORIDE AND ACETAMINOPHEN 2 TABLET: 5; 325 TABLET ORAL at 00:30

## 2024-06-04 RX ADMIN — SUCRALFATE 1 G: 1 TABLET ORAL at 20:17

## 2024-06-04 RX ADMIN — ESCITALOPRAM OXALATE 30 MG: 20 TABLET ORAL at 09:02

## 2024-06-04 RX ADMIN — SODIUM CHLORIDE, POTASSIUM CHLORIDE, SODIUM LACTATE AND CALCIUM CHLORIDE: 600; 310; 30; 20 INJECTION, SOLUTION INTRAVENOUS at 00:41

## 2024-06-04 ASSESSMENT — PAIN DESCRIPTION - LOCATION
LOCATION: ABDOMEN

## 2024-06-04 ASSESSMENT — PAIN SCALES - GENERAL
PAINLEVEL_OUTOF10: 0
PAINLEVEL_OUTOF10: 10
PAINLEVEL_OUTOF10: 0
PAINLEVEL_OUTOF10: 10
PAINLEVEL_OUTOF10: 1
PAINLEVEL_OUTOF10: 4
PAINLEVEL_OUTOF10: 4
PAINLEVEL_OUTOF10: 1
PAINLEVEL_OUTOF10: 4
PAINLEVEL_OUTOF10: 1
PAINLEVEL_OUTOF10: 8
PAINLEVEL_OUTOF10: 0

## 2024-06-04 ASSESSMENT — PAIN DESCRIPTION - DESCRIPTORS: DESCRIPTORS: STABBING;TENDER

## 2024-06-04 NOTE — PLAN OF CARE
Problem: Discharge Planning  Goal: Discharge to home or other facility with appropriate resources  6/4/2024 0957 by Jarocho Guallpa LSW  Outcome: Progressing  6/4/2024 0316 by Naren Grayson RN  Outcome: Progressing

## 2024-06-04 NOTE — CARE COORDINATION
6/4/24, 9:56 AM EDT    DISCHARGE PLANNING EVALUATION    Received Social Work consult “For consideration of Rehab”.  Addiction/KASSANDRA  consulted.   met with patient, following for needs.  Full Social Consult deferred.

## 2024-06-04 NOTE — CARE COORDINATION
Case Management Assessment Initial Evaluation    Date/Time of Evaluation: 2024 2:51 PM  Assessment Completed by: Evon Foy RN    If patient is discharged prior to next notation, then this note serves as note for discharge by case management.    Patient Name: Guerrero Farooq                   YOB: 1975  Diagnosis: Acute alcohol intoxication with alcoholism, with unspecified complication (HCC) [F10.229]  Acute alcoholic intoxication without complication (HCC) [F10.920]  Acute alcohol intoxication, with unspecified complication (HCC) [F10.929]                   Date / Time: 2024  2:05 PM  Location: [address] [place] [address]     Patient Admission Status: Inpatient   Readmission Risk Low 0-14, Mod 15-19), High > 20: Readmission Risk Score: 7    Current PCP: Beth Perla, APRN - CNP    Additional Case Management Notes: Admit for ER for alcohol withdrawal. Psych consulted for depression. Clear liquid diet. Etoh on admission 0.41. LR infusion. PRN Phenobarbital.     Procedures: none    Imagin/2 CT Head WO: 1. No acute intracranial process. Paranasal sinus disease.   2. Question chronic findings left maxillary sinus with prior maxillary sinus   surgery versus an expansile mass. Clinical correlation recommended.    CT Abd/Plv W: Hepatic steatosis. Relatively gassy abdomen. Study otherwise   unremarkable.   6/3 US Gallbladder: Hepatic steatosis.     Patient Goals/Plan/Treatment Preferences: From apartment alone. States he owes his landlord for past rent. He is concerned about his finances. His only support are his parents, and he voiced they aren't able to help much at this time.   Voiced that he doesn't plan on a rehab program after this stay as he needs to work. He has been trying to get a job. Transportation is a concern. He has a truck but states he doesn't know where it is currently and that it isn't very reliable.    Reviewed that the  had encouraged him to reach out to [employer]

## 2024-06-05 VITALS
WEIGHT: 220 LBS | BODY MASS INDEX: 31.5 KG/M2 | HEIGHT: 70 IN | TEMPERATURE: 98 F | RESPIRATION RATE: 11 BRPM | SYSTOLIC BLOOD PRESSURE: 117 MMHG | OXYGEN SATURATION: 99 % | DIASTOLIC BLOOD PRESSURE: 84 MMHG | HEART RATE: 89 BPM

## 2024-06-05 LAB
ALBUMIN SERPL BCG-MCNC: 3.4 G/DL (ref 3.5–5.1)
ALP SERPL-CCNC: 100 U/L (ref 38–126)
ALT SERPL W/O P-5'-P-CCNC: 16 U/L (ref 11–66)
ANION GAP SERPL CALC-SCNC: 9 MEQ/L (ref 8–16)
AST SERPL-CCNC: 22 U/L (ref 5–40)
BASOPHILS ABSOLUTE: 0 THOU/MM3 (ref 0–0.1)
BASOPHILS NFR BLD AUTO: 0.8 %
BILIRUB SERPL-MCNC: 0.5 MG/DL (ref 0.3–1.2)
BUN SERPL-MCNC: 5 MG/DL (ref 7–22)
CALCIUM SERPL-MCNC: 8.5 MG/DL (ref 8.5–10.5)
CHLORIDE SERPL-SCNC: 100 MEQ/L (ref 98–111)
CO2 SERPL-SCNC: 27 MEQ/L (ref 23–33)
CREAT SERPL-MCNC: 1 MG/DL (ref 0.4–1.2)
DEPRECATED RDW RBC AUTO: 43.8 FL (ref 35–45)
EOSINOPHIL NFR BLD AUTO: 5.1 %
EOSINOPHILS ABSOLUTE: 0.2 THOU/MM3 (ref 0–0.4)
ERYTHROCYTE [DISTWIDTH] IN BLOOD BY AUTOMATED COUNT: 11.8 % (ref 11.5–14.5)
GFR SERPL CREATININE-BSD FRML MDRD: > 90 ML/MIN/1.73M2
GLUCOSE SERPL-MCNC: 77 MG/DL (ref 70–108)
HCT VFR BLD AUTO: 36.5 % (ref 42–52)
HGB BLD-MCNC: 12.5 GM/DL (ref 14–18)
IMM GRANULOCYTES # BLD AUTO: 0.01 THOU/MM3 (ref 0–0.07)
IMM GRANULOCYTES NFR BLD AUTO: 0.3 %
LYMPHOCYTES ABSOLUTE: 1.1 THOU/MM3 (ref 1–4.8)
LYMPHOCYTES NFR BLD AUTO: 28.5 %
MAGNESIUM SERPL-MCNC: 1.5 MG/DL (ref 1.6–2.4)
MCH RBC QN AUTO: 34.2 PG (ref 26–33)
MCHC RBC AUTO-ENTMCNC: 34.2 GM/DL (ref 32.2–35.5)
MCV RBC AUTO: 100 FL (ref 80–94)
MONOCYTES ABSOLUTE: 0.4 THOU/MM3 (ref 0.4–1.3)
MONOCYTES NFR BLD AUTO: 9.7 %
NEUTROPHILS ABSOLUTE: 2.2 THOU/MM3 (ref 1.8–7.7)
NEUTROPHILS NFR BLD AUTO: 55.6 %
NRBC BLD AUTO-RTO: 0 /100 WBC
PLATELET # BLD AUTO: 226 THOU/MM3 (ref 130–400)
PMV BLD AUTO: 10.2 FL (ref 9.4–12.4)
POTASSIUM SERPL-SCNC: 3.5 MEQ/L (ref 3.5–5.2)
PROT SERPL-MCNC: 5.9 G/DL (ref 6.1–8)
RBC # BLD AUTO: 3.65 MILL/MM3 (ref 4.7–6.1)
SODIUM SERPL-SCNC: 136 MEQ/L (ref 135–145)
WBC # BLD AUTO: 3.9 THOU/MM3 (ref 4.8–10.8)

## 2024-06-05 PROCEDURE — 6360000002 HC RX W HCPCS

## 2024-06-05 PROCEDURE — 6370000000 HC RX 637 (ALT 250 FOR IP)

## 2024-06-05 PROCEDURE — 80053 COMPREHEN METABOLIC PANEL: CPT

## 2024-06-05 PROCEDURE — 85025 COMPLETE CBC W/AUTO DIFF WBC: CPT

## 2024-06-05 PROCEDURE — 83735 ASSAY OF MAGNESIUM: CPT

## 2024-06-05 PROCEDURE — 36415 COLL VENOUS BLD VENIPUNCTURE: CPT

## 2024-06-05 PROCEDURE — 99238 HOSP IP/OBS DSCHRG MGMT 30/<: CPT | Performed by: INTERNAL MEDICINE

## 2024-06-05 RX ORDER — FOLIC ACID 1 MG/1
1 TABLET ORAL DAILY
Qty: 30 TABLET | Refills: 3 | Status: SHIPPED | OUTPATIENT
Start: 2024-06-06

## 2024-06-05 RX ORDER — PANTOPRAZOLE SODIUM 40 MG/1
40 TABLET, DELAYED RELEASE ORAL
Qty: 30 TABLET | Refills: 3 | Status: SHIPPED | OUTPATIENT
Start: 2024-06-05

## 2024-06-05 RX ORDER — SUCRALFATE 1 G/1
1 TABLET ORAL
Qty: 120 TABLET | Refills: 3 | Status: SHIPPED | OUTPATIENT
Start: 2024-06-05

## 2024-06-05 RX ORDER — MULTIVITAMIN WITH IRON
1 TABLET ORAL DAILY
Qty: 30 TABLET | Refills: 0 | Status: SHIPPED | OUTPATIENT
Start: 2024-06-06

## 2024-06-05 RX ORDER — LANOLIN ALCOHOL/MO/W.PET/CERES
100 CREAM (GRAM) TOPICAL DAILY
Qty: 30 TABLET | Refills: 3 | Status: SHIPPED | OUTPATIENT
Start: 2024-06-06

## 2024-06-05 RX ADMIN — HYDRALAZINE HYDROCHLORIDE 5 MG: 20 INJECTION INTRAMUSCULAR; INTRAVENOUS at 11:23

## 2024-06-05 RX ADMIN — FOLIC ACID 1 MG: 1 TABLET ORAL at 09:18

## 2024-06-05 RX ADMIN — METOPROLOL SUCCINATE 50 MG: 50 TABLET, EXTENDED RELEASE ORAL at 09:19

## 2024-06-05 RX ADMIN — SUCRALFATE 1 G: 1 TABLET ORAL at 09:19

## 2024-06-05 RX ADMIN — DICYCLOMINE HYDROCHLORIDE 20 MG: 10 CAPSULE ORAL at 09:19

## 2024-06-05 RX ADMIN — Medication 100 MG: at 09:19

## 2024-06-05 RX ADMIN — ESCITALOPRAM OXALATE 30 MG: 20 TABLET ORAL at 09:18

## 2024-06-05 RX ADMIN — Medication 1 TABLET: at 09:19

## 2024-06-05 RX ADMIN — SUCRALFATE 1 G: 1 TABLET ORAL at 05:58

## 2024-06-05 RX ADMIN — PANTOPRAZOLE SODIUM 40 MG: 40 TABLET, DELAYED RELEASE ORAL at 05:58

## 2024-06-05 ASSESSMENT — PAIN SCALES - GENERAL: PAINLEVEL_OUTOF10: 1

## 2024-06-05 ASSESSMENT — PAIN DESCRIPTION - LOCATION: LOCATION: ABDOMEN

## 2024-06-05 NOTE — PLAN OF CARE
Problem: Pain  Goal: Verbalizes/displays adequate comfort level or baseline comfort level  6/4/2024 2126 by Tayla Mackay, RN  Outcome: Progressing  Flowsheets (Taken 6/4/2024 2126)  Verbalizes/displays adequate comfort level or baseline comfort level:   Encourage patient to monitor pain and request assistance   Assess pain using appropriate pain scale   Administer analgesics based on type and severity of pain and evaluate response   Implement non-pharmacological measures as appropriate and evaluate response  6/4/2024 2024 by Desirae Griffin, RN  Outcome: Progressing     Problem: Discharge Planning  Goal: Discharge to home or other facility with appropriate resources  6/4/2024 2126 by Tayla Mackay, RN  Outcome: Progressing  Flowsheets (Taken 6/4/2024 2126)  Discharge to home or other facility with appropriate resources:   Identify barriers to discharge with patient and caregiver   Identify discharge learning needs (meds, wound care, etc)   Arrange for needed discharge resources and transportation as appropriate   Arrange for interpreters to assist at discharge as needed  6/4/2024 2024 by Desirae Griffin, RN  Outcome: Progressing  6/4/2024 0957 by Jarocho Guallpa, [facility]  Outcome: Progressing     Problem: Chronic Conditions and Co-morbidities  Goal: Patient's chronic conditions and co-morbidity symptoms are monitored and maintained or improved  Outcome: Progressing  Flowsheets (Taken 6/4/2024 2126)  Care Plan - Patient's Chronic Conditions and Co-Morbidity Symptoms are Monitored and Maintained or Improved:   Monitor and assess patient's chronic conditions and comorbid symptoms for stability, deterioration, or improvement   Collaborate with multidisciplinary team to address chronic and comorbid conditions and prevent exacerbation or deterioration   Update acute care plan with appropriate goals if chronic or comorbid symptoms are exacerbated and prevent overall improvement and discharge

## 2024-06-05 NOTE — PLAN OF CARE
Problem: Pain  Goal: Verbalizes/displays adequate comfort level or baseline comfort level  6/5/2024 1341 by Desirae Griffin RN  Outcome: Adequate for Discharge  6/5/2024 1237 by Desirae Griffin RN  Outcome: Progressing     Problem: Discharge Planning  Goal: Discharge to home or other facility with appropriate resources  6/5/2024 1341 by Desirae Griffin RN  Outcome: Adequate for Discharge  6/5/2024 1237 by Desirae Griffin RN  Outcome: Progressing     Problem: Chronic Conditions and Co-morbidities  Goal: Patient's chronic conditions and co-morbidity symptoms are monitored and maintained or improved  6/5/2024 1341 by Desirae Griffin RN  Outcome: Adequate for Discharge  6/5/2024 1237 by Desirae Griffin RN  Outcome: Progressing

## 2024-06-05 NOTE — CONSULTS
Brief Intervention and Referral to Treatment Summary    Patient was provided PHQ-9, AUDIT-C and DAST Screening:      PHQ-9 Score: 0  AUDIT-C Score:  12  DAST Score:  0    Patient’s substance use is considered     Harmful      Patient’s depression is considered:     Minimal    Brief Education Was Provided    Patient was not receptive      Brief Intervention Is Provided (Only for AUDIT-C or DAST)       Patient denies readiness to decrease and/or stop use and a plan was not discussed    Recommendations/Referrals for Brief and/or Specialized Treatment Provided to Patient:      Jonas reports that he is not interested in residential treatment due to needing to work and not being able to go somewhere and stay. When SW asked about resources for AA meetings as well as outpatient resources, he denied wanting either of these as well. Patient is very concerned about financial resources as he is struggling to pay his bills right now. Made him aware there is a consult for the unit SW who can follow up and provide him with resources that this SW does not have on hand at this time.   
Patient interviewed  Meds and chart reviewed  Orders written  No  Full consult will be dictated  Thanks for the consult.  
The patient is a 48-year-old   male.  He presented to [facility] due to alcohol withdrawal.  He has a long history of alcohol use.  He also reports a long history of depression and anxiety.  He reports feeling depressed but denies suicidal thoughts.  I saw him on consult at [facility] about 2 weeks ago for the same symptoms.  I recommended him to go back to long-term rehab, but he refused.  He is not able to stay sober from alcohol.    PLAN:    1. Continue medical management per Hillary Avalos and her associates.  2. Agreeable with detox protocol.  3. Continue with escitalopram.  Strongly encourage the patient to get involved in long-term rehab for alcohol use disorder.    Thanks, Hillary Avalos, for allowing me to participate in care of this patient.          LATOSHA CARBAJAL MD      D:  06/04/2024 21:32:59     T:  06/04/2024 23:38:32     GERALD/SANKET  Job #:  [id number]     Doc#:  [id number]    CC:   Hillary Avalos

## 2024-06-05 NOTE — PROGRESS NOTES
Hospitalist Progress Note    Patient:  Guerrero Farooq    YOB: 1975  Unit/Bed:3A-10/010-A  Date of Admission: 6/2/2024  Code Status: Full Code      Assessment/Plan:    Acute alcohol intoxication, with withdrawal: Reports drinking 1 to 2 L vodka daily, last drink morning of admission.  Initial EtOH 0.41, elevated alk phos.  Found by patient's mother, also endorsing frequent falls.  On phenobarbital protocol.   CTh unremarkable for acute changes.  Patient at risk for Wernicke's encephalopathy due to frequent falls/difficulty walking, chronic alcohol use.  Gentle IV fluids, daily multivitamin, folic acid, Thiamine 100 mg daily    Abdominal pain: pt reports severe RUQ and epigastric pain. Lipase WNL, CT abd/pelvis today shows hepatic steatosis, \"relatively gassy abdomen\". RUQ ultrasound demonstrated hepatic steatosis  Concern for gastritis vs ulcers given alcoholic hx and pain location/description- will trial carafate with PPI.  Clear liquid diet at this time  Simethicone, BID PO PPI, bentyl prn  Given ongoing abdominal pain, pt requesting increased pain medication (currently Percocet pain panel every q8 hrs). No further medications added as it may worsen constipation. Senna added  Pt had EGD in 2018- had gastritis/duodenitis/esophagitis. Pt reports he has not been taking any acid reducers.     Severe depression: Limited insight, very infrequent eye contact, very tearful on exam.  Does not follow with a therapist or psychiatrist or symptoms.  Reports Lexapro not working anymore.  Psychiatry consulted    Essential hypertension: Continue metoprolol with holding parameters      LDA: []CVC / []PICC / []Midline / []Forbes / []Drains / []Mediport / [x]None  Antibiotics: None  Steroids: None  Labs (still needed?): [x]Yes / []No  IVF (still needed?): [x]Yes / []No    Level of care: [x]Step Down / []Med-Surg  Bed Status: [x]Inpatient / []Observation  Telemetry: [x]Yes / []No  PT/OT: []Yes / [x]No    DVT 
  Physician Progress Note      PATIENT:               ABDELRAHMAN FINCH  [facility] #:                  [id number]  :                       1975  ADMIT DATE:       2024 2:05 PM  DISCH DATE:  RESPONDING  PROVIDER #:        Chanelle Hernandez PA-C          QUERY TEXT:    Patient admitted with alcohol intoxication.  Noted documentation of   depression. Please document in progress notes and discharge summary if you are   treating/evaluating the following:    The medical record reflects the following:  Risk Factors: HTN, alcohol abuse  Clinical Indicators: Mother reports significant history of depression and   concerned his current medications are not helping any more. Severe depression:   Limited insight, very frequent eye Tach, very tearful on exam.  Treatment: Lexapro, Psychiatry consulted  Options provided:  -- Major depression, recurrent: mild  -- Major depression, recurrent: moderate  -- Major depression, recurrent:  severe without psychotic features  -- Major depression, single episode: mild  -- Major depression, single episode: moderate  -- Major depression, single episode: severe without psychotic features  -- Major depression, single episode: unspecified  -- Other - I will add my own diagnosis  -- Disagree - Not applicable / Not valid  -- Disagree - Clinically unable to determine / Unknown  -- Refer to Clinical Documentation Reviewer    PROVIDER RESPONSE TEXT:    Provider is clinically unable to determine a response to this query.    Query created by: Susanne Rojas on 2024 1:04 PM      Electronically signed by:  Chanelle Hernandez PA-C 2024 1:43 PM          
Assessment  Guerrero is a 48 year old male admitted to 3A 10. Patient has no family present. This  visited patient due to a consult from patient nurse too provide support to patient for emotional and spiritual distress. Patient is very tearful and anxious and said, he has loss his job, his truck is down, cannot pay his rent, his gas and light bills are all passed due. He said he has no place to turn. His family don't want to be around him. He has no idea where to turn.  I  Intervention:  I encouraged patient, provided active listening., showed him that there might be help available through [employer] and family in his county for him to check out.I also said to him I will talked with  on the unit to check for him. I had prayer with him.    Outcome:  Patient felt encouraged to and willing to ask for some help from [employer] family service. He thanked me for showing him some of the avenues where he can get help from in his county.    Plan:   This  will [place] back with the staff to see need for additional spiritual and emotional support for patient moving forward. Spiritual support remain available to patient at this time.  
PIV and telemetry removed from patient. Discharge instructions given and all questions answered at this time. Patient provided with appointment for a primary care. Patient has another appointment set up for a new primary care and instructed to follow up with one of the providers as the new provider with his established appointment the patient had left at home and was unable to provide to case management. Cab voucher services were faxed. Cab voucher was approved through [facility] and patient was escorted to [facility]. The main lobby confirmed that he arrived in the cab safely.   
Utilize [facility] alcohol withdrawal scale (Based on Gretchen Modified Alcohol Withdrawal Scale).  Tabulate score based on classifications including Tremor, Sweating, Hallucination, Orientation, and Agitation.    Tremor: 0  Sweatin  Hallucinations: 0  Orientation: 0  Agitation: 1  Total Score: 1  Action perform as described below     Tremor:  No tremor is 0 points.  Tremor on movement is 1 point.  Tremor at rest is 2 points.  Sweating: No Sweat 0 points. Moist is 1 point.  Drenching sweats is 2 points.  Hallucinations: No present 0 points. Dissuadable is 1 point. Not dissuadable is 2 points.  Orientation: Oriented 0 points. Vague/detached 1 point. Disoriented/no contact 2 points.  Agitation: Calm 0 points.  Anxious 1 point. Panicky 2 points.    Check scale every 2 hours.  Discontinue scoring with 4 consecutive scorings of 0.  Scale 0: No phenobarbital given.  Re-assess every 60 minutes as needed.   Scale 1-3: Phenobarbital 130 mg IV over 3 minutes. Re-assess every 60 minutes as needed.  May administer every 60 minutes to a maximum dose of phenobarbital 1040 mg in 24 hours!  Scale 4-8: Phenobarbital 260 mg IV over 5 minutes.  Re-assess every 60 minutes as needed. May administer every 60 minutes to a maximum dose of phenobarbital 1040mg in 24 hours!  Scale 9-10: Transfer to ICU (if not already in ICU).  Administer 10mg/kg phenobarbital IV over 60 minutes.  Maximum dose phenobarbital is 1040mg in 24 hours!   
Utilize [facility] alcohol withdrawal scale (Based on Gretchen Modified Alcohol Withdrawal Scale).  Tabulate score based on classifications including Tremor, Sweating, Hallucination, Orientation, and Agitation.    Tremor: 0  Sweatin  Hallucinations: 0  Orientation: 0  Agitation: 2  Total Score: 3   Action perform as described below     Tremor:  No tremor is 0 points.  Tremor on movement is 1 point.  Tremor at rest is 2 points.  Sweating: No Sweat 0 points. Moist is 1 point.  Drenching sweats is 2 points.  Hallucinations: No present 0 points. Dissuadable is 1 point. Not dissuadable is 2 points.  Orientation: Oriented 0 points. Vague/detached 1 point. Disoriented/no contact 2 points.  Agitation: Calm 0 points.  Anxious 1 point. Panicky 2 points.    Check scale every 2 hours.  Discontinue scoring with 4 consecutive scorings of 0.  Scale 0: No phenobarbital given.  Re-assess every 60 minutes as needed.   Scale 1-3: Phenobarbital 130 mg IV over 3 minutes. Re-assess every 60 minutes as needed.  May administer every 60 minutes to a maximum dose of phenobarbital 1040 mg in 24 hours!  Scale 4-8: Phenobarbital 260 mg IV over 5 minutes.  Re-assess every 60 minutes as needed. May administer every 60 minutes to a maximum dose of phenobarbital 1040mg in 24 hours!  Scale 9-10: Transfer to ICU (if not already in ICU).  Administer 10mg/kg phenobarbital IV over 60 minutes.  Maximum dose phenobarbital is 1040mg in 24 hours!   
Utilize [facility] alcohol withdrawal scale (Based on Gretchen Modified Alcohol Withdrawal Scale).  Tabulate score based on classifications including Tremor, Sweating, Hallucination, Orientation, and Agitation.    Tremor: 0  Sweatin  Hallucinations: 0  Orientation: 0  Agitation: 0    Total Score: 0    Action perform as described below     Tremor:  No tremor is 0 points.  Tremor on movement is 1 point.  Tremor at rest is 2 points.  Sweating: No Sweat 0 points. Moist is 1 point.  Drenching sweats is 2 points.  Hallucinations: No present 0 points. Dissuadable is 1 point. Not dissuadable is 2 points.  Orientation: Oriented 0 points. Vague/detached 1 point. Disoriented/no contact 2 points.  Agitation: Calm 0 points.  Anxious 1 point. Panicky 2 points.    Check scale every 2 hours.  Discontinue scoring with 4 consecutive scorings of 0.  Scale 0: No phenobarbital given.  Re-assess every 60 minutes as needed.   Scale 1-3: Phenobarbital 130 mg IV over 3 minutes. Re-assess every 60 minutes as needed.  May administer every 60 minutes to a maximum dose of phenobarbital 1040 mg in 24 hours!  Scale 4-8: Phenobarbital 260 mg IV over 5 minutes.  Re-assess every 60 minutes as needed. May administer every 60 minutes to a maximum dose of phenobarbital 1040mg in 24 hours!  Scale 9-10: Transfer to ICU (if not already in ICU).  Administer 10mg/kg phenobarbital IV over 60 minutes.  Maximum dose phenobarbital is 1040mg in 24 hours!        
FACILITY use dose modulation, iterative reconstruction, and/or weight-based dosing when appropriate to reduce radiation dose to as low as reasonably achievable. FINDINGS: VENTRICLES: Normal in size, contour, position... PARENCHYMA:  No acute infarction, mass lesion, or intracranial hemorrhage is seen. MASTOID PROCESSES: Well aerated. Normal in appearance..  PARANASAL SINUSES/CALVARIUM: No skull fracture seen. Near complete opacification of the left maxillary sinus with apparent bulging of the medial maxillary sinus wall and questionable bone destruction. Question prior surgery on the left maxillary sinus. Clinical correlation recommended. Cannot exclude an expansile mass.. Moderate mucosal thickening in the left ethmoid sinus and left frontal sinus. Right maxillary sinus and sphenoid sinus unremarkable.     1. No acute intracranial process. Paranasal sinus disease. 2. Question chronic findings left maxillary sinus with prior maxillary sinus surgery versus an expansile mass. Clinical correlation recommended. **This report has been created using voice recognition software.  It may contain minor errors which are inherent in voice recognition technology.**      Electronically signed by Chanelle Serrano PA-C on 6/3/2024 at 8:43 AM

## 2024-06-05 NOTE — PLAN OF CARE
Problem: Pain  Goal: Verbalizes/displays adequate comfort level or baseline comfort level  Outcome: Progressing     Problem: Discharge Planning  Goal: Discharge to home or other facility with appropriate resources  6/4/2024 2024 by Desirae Griffin RN  Outcome: Progressing  6/4/2024 0957 by Jarocho Guallpa, ESVINW  Outcome: Progressing

## 2024-06-05 NOTE — DISCHARGE SUMMARY
Status: Final  Sodium                                        Date: 06/04/2024  Value: 137         Ref range: 135 - 145 meq/L    Status: Final  Potassium reflex Magnesium                    Date: 06/04/2024  Value: 4.0         Ref range: 3.5 - 5.2 meq/L    Status: Final  Chloride                                      Date: 06/04/2024  Value: 97 (L)      Ref range: 98 - 111 meq/L     Status: Final  CO2                                           Date: 06/04/2024  Value: 28          Ref range: 23 - 33 meq/L      Status: Final  Calcium                                       Date: 06/04/2024  Value: 8.8         Ref range: 8.5 - 10.5 mg/dL   Status: Final  AST                                           Date: 06/04/2024  Value: 26          Ref range: 5 - 40 U/L         Status: Final  Alkaline Phosphatase                          Date: 06/04/2024  Value: 108         Ref range: 38 - 126 U/L       Status: Final  Total Protein                                 Date: 06/04/2024  Value: 6.5         Ref range: 6.1 - 8.0 g/dL     Status: Final  Albumin                                       Date: 06/04/2024  Value: 3.7         Ref range: 3.5 - 5.1 g/dL     Status: Final  Total Bilirubin                               Date: 06/04/2024  Value: 0.6         Ref range: 0.3 - 1.2 mg/dL    Status: Final  ALT                                           Date: 06/04/2024  Value: 19          Ref range: 11 - 66 U/L        Status: Final                Comment: Performed at [facility] [address] [place] [address]  WBC                                           Date: 06/04/2024  Value: 5.9         Ref range: 4.8 - 10.8 thou/*  Status: Final  RBC                                           Date: 06/04/2024  Value: 3.85 (L)    Ref range: 4.70 - 6.10 mill*  Status: Final  Hemoglobin                                    Date: 06/04/2024  Value: 13.3 (L)    Ref range: 14.0 - 18.0 gm/dl  Status: Final  Hematocrit

## 2024-06-05 NOTE — PLAN OF CARE
Problem: Pain  Goal: Verbalizes/displays adequate comfort level or baseline comfort level  Outcome: Progressing     Problem: Discharge Planning  Goal: Discharge to home or other facility with appropriate resources  Outcome: Progressing     Problem: Chronic Conditions and Co-morbidities  Goal: Patient's chronic conditions and co-morbidity symptoms are monitored and maintained or improved  Outcome: Progressing

## 2024-06-06 LAB — VIT B1 PYROPHOSHATE BLD-SCNC: 303 NMOL/L (ref 70–180)

## 2025-04-16 ENCOUNTER — APPOINTMENT (OUTPATIENT)
Dept: CT IMAGING | Age: 50
End: 2025-04-16
Payer: MEDICAID

## 2025-04-16 ENCOUNTER — HOSPITAL ENCOUNTER (EMERGENCY)
Age: 50
Discharge: HOME | End: 2025-04-16
Attending: STUDENT IN AN ORGANIZED HEALTH CARE EDUCATION/TRAINING PROGRAM
Payer: MEDICAID

## 2025-04-16 VITALS
OXYGEN SATURATION: 94 % | SYSTOLIC BLOOD PRESSURE: 151 MMHG | HEART RATE: 117 BPM | TEMPERATURE: 98 F | RESPIRATION RATE: 14 BRPM | DIASTOLIC BLOOD PRESSURE: 113 MMHG

## 2025-04-16 DIAGNOSIS — R11.2 NAUSEA AND VOMITING, UNSPECIFIED VOMITING TYPE: ICD-10-CM

## 2025-04-16 DIAGNOSIS — R10.13 EPIGASTRIC PAIN: ICD-10-CM

## 2025-04-16 DIAGNOSIS — F10.920 ACUTE ALCOHOLIC INTOXICATION WITHOUT COMPLICATION: Primary | ICD-10-CM

## 2025-04-16 DIAGNOSIS — F32.A DEPRESSION, UNSPECIFIED DEPRESSION TYPE: ICD-10-CM

## 2025-04-16 LAB
ALBUMIN SERPL BCG-MCNC: 4.5 G/DL (ref 3.4–4.9)
ALP SERPL-CCNC: 129 U/L (ref 40–129)
ALT SERPL W/O P-5'-P-CCNC: 45 U/L (ref 10–50)
AMPHETAMINES UR QL SCN: NEGATIVE
ANION GAP SERPL CALC-SCNC: 19 MEQ/L (ref 8–16)
APAP SERPL-MCNC: < 5 UG/ML (ref 10–30)
AST SERPL-CCNC: 37 U/L (ref 10–50)
BARBITURATES UR QL SCN: NEGATIVE
BASOPHILS ABSOLUTE: 0 THOU/MM3 (ref 0–0.1)
BASOPHILS NFR BLD AUTO: 0.3 %
BENZODIAZ UR QL SCN: NEGATIVE
BILIRUB SERPL-MCNC: 0.2 MG/DL (ref 0.3–1.2)
BILIRUB UR QL STRIP.AUTO: NEGATIVE
BUN SERPL-MCNC: 15 MG/DL (ref 8–23)
BZE UR QL SCN: NEGATIVE
CALCIUM SERPL-MCNC: 10.2 MG/DL (ref 8.6–10)
CANNABINOIDS UR QL SCN: NEGATIVE
CHARACTER UR: CLEAR
CHLORIDE SERPL-SCNC: 97 MEQ/L (ref 98–111)
CO2 SERPL-SCNC: 26 MEQ/L (ref 22–29)
COLOR, UA: YELLOW
CREAT SERPL-MCNC: 0.9 MG/DL (ref 0.7–1.2)
DEPRECATED RDW RBC AUTO: 40.9 FL (ref 35–45)
EKG ATRIAL RATE: 130 BPM
EKG P AXIS: 32 DEGREES
EKG P-R INTERVAL: 130 MS
EKG Q-T INTERVAL: 300 MS
EKG QRS DURATION: 80 MS
EKG QTC CALCULATION (BAZETT): 441 MS
EKG R AXIS: 75 DEGREES
EKG T AXIS: 22 DEGREES
EKG VENTRICULAR RATE: 130 BPM
EOSINOPHIL NFR BLD AUTO: 0.3 %
EOSINOPHILS ABSOLUTE: 0 THOU/MM3 (ref 0–0.4)
ERYTHROCYTE [DISTWIDTH] IN BLOOD BY AUTOMATED COUNT: 12.5 % (ref 11.5–14.5)
ETHANOL SERPL-MCNC: 0.16 % (ref 0–0.08)
ETHANOL SERPL-MCNC: 0.3 % (ref 0–0.08)
FENTANYL: NEGATIVE
GFR SERPL CREATININE-BSD FRML MDRD: > 90 ML/MIN/1.73M2
GLUCOSE SERPL-MCNC: 149 MG/DL (ref 74–109)
GLUCOSE UR QL STRIP.AUTO: NEGATIVE MG/DL
HCT VFR BLD AUTO: 47.4 % (ref 42–52)
HGB BLD-MCNC: 16.8 GM/DL (ref 14–18)
HGB UR QL STRIP.AUTO: NEGATIVE
IMM GRANULOCYTES # BLD AUTO: 0.08 THOU/MM3 (ref 0–0.07)
IMM GRANULOCYTES NFR BLD AUTO: 0.5 %
KETONES UR QL STRIP.AUTO: ABNORMAL
LIPASE SERPL-CCNC: 31 U/L (ref 13–60)
LYMPHOCYTES ABSOLUTE: 4.6 THOU/MM3 (ref 1–4.8)
LYMPHOCYTES NFR BLD AUTO: 30.1 %
MAGNESIUM SERPL-MCNC: 2 MG/DL (ref 1.6–2.6)
MCH RBC QN AUTO: 31.5 PG (ref 26–33)
MCHC RBC AUTO-ENTMCNC: 35.4 GM/DL (ref 32.2–35.5)
MCV RBC AUTO: 88.8 FL (ref 80–94)
MONOCYTES ABSOLUTE: 1.1 THOU/MM3 (ref 0.4–1.3)
MONOCYTES NFR BLD AUTO: 7 %
NEUTROPHILS ABSOLUTE: 9.5 THOU/MM3 (ref 1.8–7.7)
NEUTROPHILS NFR BLD AUTO: 61.8 %
NITRITE UR QL STRIP: NEGATIVE
NRBC BLD AUTO-RTO: 0 /100 WBC
OPIATES UR QL SCN: POSITIVE
OSMOLALITY SERPL CALC.SUM OF ELEC: 286.8 MOSMOL/KG (ref 275–300)
OXYCODONE: NEGATIVE
PCP UR QL SCN: NEGATIVE
PH UR STRIP.AUTO: 6 [PH] (ref 5–9)
PLATELET # BLD AUTO: 498 THOU/MM3 (ref 130–400)
PMV BLD AUTO: 9.6 FL (ref 9.4–12.4)
POTASSIUM SERPL-SCNC: 3.7 MEQ/L (ref 3.5–5.2)
PROT SERPL-MCNC: 7.7 G/DL (ref 6.4–8.3)
PROT UR STRIP.AUTO-MCNC: NEGATIVE MG/DL
RBC # BLD AUTO: 5.34 MILL/MM3 (ref 4.7–6.1)
SALICYLATES SERPL-MCNC: < 0.5 MG/DL (ref 2–10)
SODIUM SERPL-SCNC: 142 MEQ/L (ref 135–145)
SP GR UR REFRACT.AUTO: > 1.03 (ref 1–1.03)
UROBILINOGEN, URINE: 0.2 EU/DL (ref 0–1)
WBC # BLD AUTO: 15.4 THOU/MM3 (ref 4.8–10.8)
WBC #/AREA URNS HPF: NEGATIVE /[HPF]

## 2025-04-16 PROCEDURE — 74177 CT ABD & PELVIS W/CONTRAST: CPT

## 2025-04-16 PROCEDURE — 6370000000 HC RX 637 (ALT 250 FOR IP): Performed by: STUDENT IN AN ORGANIZED HEALTH CARE EDUCATION/TRAINING PROGRAM

## 2025-04-16 PROCEDURE — 83690 ASSAY OF LIPASE: CPT

## 2025-04-16 PROCEDURE — 2580000003 HC RX 258: Performed by: STUDENT IN AN ORGANIZED HEALTH CARE EDUCATION/TRAINING PROGRAM

## 2025-04-16 PROCEDURE — 6360000002 HC RX W HCPCS

## 2025-04-16 PROCEDURE — 6360000002 HC RX W HCPCS: Performed by: EMERGENCY MEDICINE

## 2025-04-16 PROCEDURE — 80179 DRUG ASSAY SALICYLATE: CPT

## 2025-04-16 PROCEDURE — 99285 EMERGENCY DEPT VISIT HI MDM: CPT

## 2025-04-16 PROCEDURE — 36415 COLL VENOUS BLD VENIPUNCTURE: CPT

## 2025-04-16 PROCEDURE — 81003 URINALYSIS AUTO W/O SCOPE: CPT

## 2025-04-16 PROCEDURE — 93005 ELECTROCARDIOGRAM TRACING: CPT | Performed by: STUDENT IN AN ORGANIZED HEALTH CARE EDUCATION/TRAINING PROGRAM

## 2025-04-16 PROCEDURE — 96374 THER/PROPH/DIAG INJ IV PUSH: CPT

## 2025-04-16 PROCEDURE — 96375 TX/PRO/DX INJ NEW DRUG ADDON: CPT

## 2025-04-16 PROCEDURE — 83735 ASSAY OF MAGNESIUM: CPT

## 2025-04-16 PROCEDURE — 6360000002 HC RX W HCPCS: Performed by: STUDENT IN AN ORGANIZED HEALTH CARE EDUCATION/TRAINING PROGRAM

## 2025-04-16 PROCEDURE — 80307 DRUG TEST PRSMV CHEM ANLYZR: CPT

## 2025-04-16 PROCEDURE — 6370000000 HC RX 637 (ALT 250 FOR IP)

## 2025-04-16 PROCEDURE — 80143 DRUG ASSAY ACETAMINOPHEN: CPT

## 2025-04-16 PROCEDURE — 6360000004 HC RX CONTRAST MEDICATION: Performed by: STUDENT IN AN ORGANIZED HEALTH CARE EDUCATION/TRAINING PROGRAM

## 2025-04-16 PROCEDURE — 82077 ASSAY SPEC XCP UR&BREATH IA: CPT

## 2025-04-16 PROCEDURE — 85025 COMPLETE CBC W/AUTO DIFF WBC: CPT

## 2025-04-16 PROCEDURE — 80053 COMPREHEN METABOLIC PANEL: CPT

## 2025-04-16 PROCEDURE — 96361 HYDRATE IV INFUSION ADD-ON: CPT

## 2025-04-16 PROCEDURE — 96376 TX/PRO/DX INJ SAME DRUG ADON: CPT

## 2025-04-16 RX ORDER — MORPHINE SULFATE 4 MG/ML
4 INJECTION, SOLUTION INTRAMUSCULAR; INTRAVENOUS ONCE
Status: COMPLETED | OUTPATIENT
Start: 2025-04-16 | End: 2025-04-16

## 2025-04-16 RX ORDER — METOPROLOL TARTRATE 50 MG
TABLET ORAL
Status: COMPLETED
Start: 2025-04-16 | End: 2025-04-16

## 2025-04-16 RX ORDER — ONDANSETRON 2 MG/ML
4 INJECTION INTRAMUSCULAR; INTRAVENOUS ONCE
Status: COMPLETED | OUTPATIENT
Start: 2025-04-16 | End: 2025-04-16

## 2025-04-16 RX ORDER — ONDANSETRON 2 MG/ML
INJECTION INTRAMUSCULAR; INTRAVENOUS
Status: COMPLETED
Start: 2025-04-16 | End: 2025-04-16

## 2025-04-16 RX ORDER — METOPROLOL TARTRATE 50 MG
50 TABLET ORAL ONCE
Status: COMPLETED | OUTPATIENT
Start: 2025-04-16 | End: 2025-04-16

## 2025-04-16 RX ORDER — SODIUM CHLORIDE, SODIUM LACTATE, POTASSIUM CHLORIDE, AND CALCIUM CHLORIDE .6; .31; .03; .02 G/100ML; G/100ML; G/100ML; G/100ML
1000 INJECTION, SOLUTION INTRAVENOUS ONCE
Status: COMPLETED | OUTPATIENT
Start: 2025-04-16 | End: 2025-04-16

## 2025-04-16 RX ORDER — MORPHINE SULFATE 4 MG/ML
INJECTION, SOLUTION INTRAMUSCULAR; INTRAVENOUS
Status: DISCONTINUED
Start: 2025-04-16 | End: 2025-04-16 | Stop reason: HOSPADM

## 2025-04-16 RX ORDER — MULTIVITAMIN WITH IRON
1 TABLET ORAL ONCE
Status: DISCONTINUED | OUTPATIENT
Start: 2025-04-16 | End: 2025-04-16 | Stop reason: HOSPADM

## 2025-04-16 RX ORDER — FOLIC ACID 1 MG/1
1 TABLET ORAL ONCE
Status: COMPLETED | OUTPATIENT
Start: 2025-04-16 | End: 2025-04-16

## 2025-04-16 RX ORDER — IOPAMIDOL 755 MG/ML
80 INJECTION, SOLUTION INTRAVASCULAR
Status: COMPLETED | OUTPATIENT
Start: 2025-04-16 | End: 2025-04-16

## 2025-04-16 RX ORDER — THIAMINE HYDROCHLORIDE 100 MG/ML
100 INJECTION, SOLUTION INTRAMUSCULAR; INTRAVENOUS ONCE
Status: COMPLETED | OUTPATIENT
Start: 2025-04-16 | End: 2025-04-16

## 2025-04-16 RX ADMIN — SODIUM CHLORIDE, SODIUM LACTATE, POTASSIUM CHLORIDE, AND CALCIUM CHLORIDE 1000 ML: .6; .31; .03; .02 INJECTION, SOLUTION INTRAVENOUS at 17:10

## 2025-04-16 RX ADMIN — SODIUM CHLORIDE, SODIUM LACTATE, POTASSIUM CHLORIDE, AND CALCIUM CHLORIDE 1000 ML: .6; .31; .03; .02 INJECTION, SOLUTION INTRAVENOUS at 08:32

## 2025-04-16 RX ADMIN — Medication 50 MG: at 17:11

## 2025-04-16 RX ADMIN — THIAMINE HYDROCHLORIDE 100 MG: 100 INJECTION, SOLUTION INTRAMUSCULAR; INTRAVENOUS at 08:25

## 2025-04-16 RX ADMIN — ONDANSETRON 4 MG: 2 INJECTION, SOLUTION INTRAMUSCULAR; INTRAVENOUS at 08:04

## 2025-04-16 RX ADMIN — IOPAMIDOL 80 ML: 755 INJECTION, SOLUTION INTRAVENOUS at 16:45

## 2025-04-16 RX ADMIN — MORPHINE SULFATE 4 MG: 4 INJECTION, SOLUTION INTRAMUSCULAR; INTRAVENOUS at 17:10

## 2025-04-16 RX ADMIN — ONDANSETRON 4 MG: 2 INJECTION, SOLUTION INTRAMUSCULAR; INTRAVENOUS at 19:57

## 2025-04-16 RX ADMIN — FOLIC ACID 1 MG: 1 TABLET ORAL at 08:26

## 2025-04-16 RX ADMIN — ONDANSETRON 4 MG: 2 INJECTION, SOLUTION INTRAMUSCULAR; INTRAVENOUS at 17:11

## 2025-04-16 RX ADMIN — LIDOCAINE HYDROCHLORIDE: 20 SOLUTION ORAL at 08:27

## 2025-04-16 RX ADMIN — ONDANSETRON 4 MG: 2 INJECTION INTRAMUSCULAR; INTRAVENOUS at 17:11

## 2025-04-16 RX ADMIN — FAMOTIDINE 20 MG: 10 INJECTION, SOLUTION INTRAVENOUS at 08:07

## 2025-04-16 RX ADMIN — METOPROLOL TARTRATE 50 MG: 50 TABLET, FILM COATED ORAL at 17:11

## 2025-04-16 ASSESSMENT — PAIN - FUNCTIONAL ASSESSMENT: PAIN_FUNCTIONAL_ASSESSMENT: NONE - DENIES PAIN

## 2025-04-16 NOTE — ED PROVIDER NOTES
[facility]  EMERGENCY DEPARTMENT ENCOUNTER      Patient Name: Guerrero Farooq  MRN: [id number]  YOB: 1975  Date of Evaluation: 4/16/2025  Attending Physician: Vicente Pisano MD    CHIEF COMPLAINT       Chief Complaint   Patient presents with    Depression    Alcohol Intoxication       HISTORY OF PRESENT ILLNESS    HPI    History obtained from chart review and the patient.    Guerrero is a 49 y.o. old male who presents to the emergency department by Ambulance after calling 911..  Patient reports that he has not felt well recently with difficulty thinking and abdominal pain.  Reports that he recently moved to a different town for a new job and is currently living in a motel.  He called 9 1 this morning saying he wanted to talk to somebody in 1 to be evaluated for his mental health and heart problems.  Reports drinking vodka last night maybe 1/5.    On further evaluation patient after he had metabolize some of the alcohol reports that he has not actually had any alcohol from July until last night.  Has stopped his Lexapro because he ran out of this he thinks maybe a month or 2 ago and reports that he has not been feeling well since.  When asked to elaborate on his mental health status he will provide specific details but does deny suicidal homicidal ideation.    Chart reviewed, relevant history summarized in HPI above.      REVIEW OF SYSTEMS   Review of Systems  Negative unless documented in HPI    PAST MEDICAL AND SURGICAL HISTORY   Guerrero  has a past medical history of Alcohol dependence, Arthritis, GERD (gastroesophageal reflux disease), Hyperlipidemia, Hypertension, Liver disease, Panic attacks, Pneumonia, Psychiatric problem, and Scoliosis.    Guerrero  has a past surgical history that includes back surgery; EKG 12 Lead (04/14/2015); Cardiac surgery; and Colonoscopy.    CURRENT MEDICATIONS   Guerrero has a current medication list which includes the following long-term

## 2025-04-16 NOTE — ED NOTES
Patient resting in bed. Respirations easy and unlabored. No distress noted. Call light within reach. Medicated per MAR. Urine collected

## 2025-04-16 NOTE — ED NOTES
Pt to the ED via [facility]. Patient presents with complaints of depression and anxiety regarding his heart. Patient states that he was up most of the night drinking vodka, when asked how much the patient drank, patient states \"as much as I had to\". Patient denies suicidal intent/thoughts. Patient expresses years ago his doctor retired and he has not been evaluated or taking his prescribed medications since.  states he called them just to talk and after talking the patient felt it was important to come in due to his mental health and heart problems. EKG done. Patient is alert and oriented x 4 but appears intoxicated and presents with the scent of alcohol. Respirations are regular and unlabored. Call light within reach.

## 2025-04-16 NOTE — PROGRESS NOTES
[facility] CRISIS ASSESSMENT    PRESENT SITUATION  Chief Complaint per ED Provider or Assigned Nurse report:   Pt to the ED via [facility]. Patient presents with complaints of depression and anxiety regarding his heart. Patient states that he was up most of the night drinking vodka, when asked how much the patient drank, patient states \"as much as I had to\". Patient denies suicidal intent/thoughts. Patient expresses years ago his doctor retired and he has not been evaluated or taking his prescribed medications since.  states he called them just to talk and after talking the patient felt it was important to come in due to his mental health and heart problems. EKG done. Patient is alert and oriented x 4 but appears intoxicated and presents with the scent of alcohol      Chief Complaint per Patient report  \"at the point of I am not thinking right\".     Chief Complaint per Collateral contact report (Identify who and if they are present with the patient or if contacted by phone)      If collateral was not obtained why (if obtained then NA):      Provisional Diagnosis (ICD or DSM approved diagnosis only) : Unspecified Depressive Disorder    PRESENT Psychosis:    Hallucinations:  ERNESTO    Delusions:  none noted    PRESENT Suicidal Behavior (Include specific information below):      Verbal:  Denied    Attempt:  Denied    Access to Weapons:   ERNESTO    Access to the Means of self harm or harm to others identified:   ERNESTO     C-SSRS Current Suicide Risk: Low, Moderate or High:    No risk      PAST Suicidal Behavior (Include specific information below):       Verbal:  ERNESTO    Attempts:   ERNESTO    Self-Injurious/Self-Mutilation: (Specify what, how often, last time, method, etc.)   ERNESTO    Traumatic Event Within Past 2 Weeks: (Specify)  ERNESTO    Current Abuse:  (type, perpetrator, systems involved, injuries, etc.)  ERNESTO    CURRENT Violence/Aggression: (Specify)   none    PAST Violence/Aggression: (Specify)   none on chart    Risk,

## 2025-04-16 NOTE — ED NOTES
Patient medicated per MAR at this time. Patient emotional at this time and talking about his children. Patient denies further questions. Patient VSS. Respirs are easy and no acute distress noted. Call light witihin reach.

## 2025-04-16 NOTE — ED NOTES
Pt removed monitoring devices and IV. This RN assisted with dressing area and provided pt with wash cloth to clean himself up. Pt states \"I am leaving\". RN notified provider. Pt remains in room ED room 3.

## 2025-04-16 NOTE — ED NOTES
Patient resting in bed. Respirations easy and unlabored. No distress noted. Call light within reach. Water provided

## 2025-04-16 NOTE — PROGRESS NOTES
Call from Dr. Pisano to come back and see the patient. Pt denies suicidal or homicidal thoughts. Hallucination concerns denied. Pt relapsed on alcohol last night, was very tearful when asked if something happened however pt would not state what. Dr. Pisano has concerns over pt being tachycardiac, HR in the 135s sitting and abdominal pain. He plans to work-up the patient medically with more tests/imaging and pt is willing to stay fort he process. Spoke to pt about [facility] as a great resource. Informed Dr. Pisano to reach back out to [facility] if any further needs arise or pt needs a re-evaluation. Pt left with a resource packet and advised to reach out to staff if has any more questions from [facility].

## 2025-04-17 NOTE — PROGRESS NOTES
BAL 24 Hour Re-Assess:   Status & Exam & Behavior Support Service Tabs      Present Suicidal Behavior:      Verbal: Denies    Plan:  Denies    Current Suicide Risk: Low, Moderate or High: Low      Present Homicidal Behavior:    Verbal:  Denies    Plan:  Denies      Psychosis:    Hallucinations:  Denies    Delusions:  Denies      Clinical Re-Assessment Summary including Current Mental State of Patient:     Per [facility] [place]:   Patient is a 49 year old male who presents to the ED voluntarily via police. Per nursing note, pt has complaints of anxiety, depression and is intoxicated. Spoke to the patient with Dr. Pisano. Pt states he \"cannot think straight\" and \"cannot make sense of things\". Pt reports \"things have been rough the past few days.\" Pt reports \"at the point of I am not thinking right\". Pt denies suicidal or homicidal thoughts. Pt noted to have some delayed thought processing. Pt reports drinking alcohol daily with \"just enough to get by\". Pt noted to smell of alcohol. Pt started having emesis. Assessment ended at this time. BAL was a .30. Per report, pt is voluntary status.       20:20 Patient is alert and oriented by four. Patient reports feeling disappointed that he consumed alcohol on 4/16/25. He had been sober since 07/24. Patient reports he had been residing in [facility] and relocated this week for a new job in [facility]. The dates were not as planned and he arrived a week early. Patient reports he is eager to begin new employment. Patient reports his license had been suspended for three years and are now reinstated. Patient denies any thought plan or intent to harm self or others Patient reports suicide is against his [employer]. 'I would never do that'.  Patient reports long struggles with alcohol use. Patient denies delusions/hallucinations.  Patient is referred to [facility] for mental health/substance use treatment. Patient is in agreement to follow up on 4/17/25 with [facility].  Updated